# Patient Record
Sex: FEMALE | Race: WHITE | NOT HISPANIC OR LATINO | URBAN - NONMETROPOLITAN AREA
[De-identification: names, ages, dates, MRNs, and addresses within clinical notes are randomized per-mention and may not be internally consistent; named-entity substitution may affect disease eponyms.]

---

## 2020-11-22 ENCOUNTER — HOSPITAL ENCOUNTER (INPATIENT)
Facility: HOSPITAL | Age: 60
LOS: 2 days | Discharge: HOME HEALTH | DRG: 178 | End: 2020-11-24
Attending: STUDENT IN AN ORGANIZED HEALTH CARE EDUCATION/TRAINING PROGRAM | Admitting: FAMILY MEDICINE
Payer: MEDICARE

## 2020-11-22 ENCOUNTER — APPOINTMENT (EMERGENCY)
Dept: CT IMAGING | Facility: HOSPITAL | Age: 60
DRG: 178 | End: 2020-11-22
Payer: MEDICARE

## 2020-11-22 DIAGNOSIS — E83.39 HYPOPHOSPHATEMIA: ICD-10-CM

## 2020-11-22 DIAGNOSIS — E66.01 CLASS 3 SEVERE OBESITY DUE TO EXCESS CALORIES WITH SERIOUS COMORBIDITY AND BODY MASS INDEX (BMI) OF 40.0 TO 44.9 IN ADULT (HCC): ICD-10-CM

## 2020-11-22 DIAGNOSIS — Z87.09 HISTORY OF COPD: ICD-10-CM

## 2020-11-22 DIAGNOSIS — R09.02 HYPOXIA: ICD-10-CM

## 2020-11-22 DIAGNOSIS — U07.1 COVID-19 VIRUS INFECTION: ICD-10-CM

## 2020-11-22 DIAGNOSIS — E83.42 HYPOMAGNESEMIA: ICD-10-CM

## 2020-11-22 DIAGNOSIS — U07.1 COVID-19: ICD-10-CM

## 2020-11-22 DIAGNOSIS — R06.02 SHORTNESS OF BREATH: Primary | ICD-10-CM

## 2020-11-22 DIAGNOSIS — Z86.69 HISTORY OF CHIARI MALFORMATION: ICD-10-CM

## 2020-11-22 PROBLEM — I10 ESSENTIAL HYPERTENSION: Status: ACTIVE | Noted: 2020-11-22

## 2020-11-22 PROBLEM — E78.49 OTHER HYPERLIPIDEMIA: Status: ACTIVE | Noted: 2020-11-22

## 2020-11-22 PROBLEM — F41.9 ANXIETY: Status: ACTIVE | Noted: 2020-11-22

## 2020-11-22 PROBLEM — E66.813 CLASS 3 SEVERE OBESITY DUE TO EXCESS CALORIES IN ADULT (HCC): Status: ACTIVE | Noted: 2020-11-22

## 2020-11-22 PROBLEM — K21.9 GASTROESOPHAGEAL REFLUX DISEASE WITHOUT ESOPHAGITIS: Status: ACTIVE | Noted: 2020-11-22

## 2020-11-22 PROBLEM — F17.200 CURRENT SMOKER: Status: ACTIVE | Noted: 2020-11-22

## 2020-11-22 LAB
ABO GROUP BLD: NORMAL
ANION GAP SERPL CALCULATED.3IONS-SCNC: 8 MMOL/L (ref 4–13)
BACTERIA UR QL AUTO: ABNORMAL /HPF
BASE EX.OXY STD BLDV CALC-SCNC: 84.9 % (ref 60–80)
BASE EXCESS BLDV CALC-SCNC: 3 MMOL/L
BASOPHILS # BLD AUTO: 0.03 THOUSANDS/ΜL (ref 0–0.1)
BASOPHILS NFR BLD AUTO: 0 % (ref 0–1)
BILIRUB UR QL STRIP: NEGATIVE
BUN SERPL-MCNC: 11 MG/DL (ref 5–25)
CALCIUM SERPL-MCNC: 8.9 MG/DL (ref 8.3–10.1)
CHLORIDE SERPL-SCNC: 104 MMOL/L (ref 100–108)
CK SERPL-CCNC: 92 U/L (ref 26–192)
CLARITY UR: CLEAR
CO2 SERPL-SCNC: 28 MMOL/L (ref 21–32)
COLOR UR: YELLOW
CREAT SERPL-MCNC: 0.75 MG/DL (ref 0.6–1.3)
CRP SERPL QL: 7.8 MG/L
D DIMER PPP FEU-MCNC: 1.61 UG/ML FEU
EOSINOPHIL # BLD AUTO: 0.25 THOUSAND/ΜL (ref 0–0.61)
EOSINOPHIL NFR BLD AUTO: 3 % (ref 0–6)
ERYTHROCYTE [DISTWIDTH] IN BLOOD BY AUTOMATED COUNT: 13.5 % (ref 11.6–15.1)
FERRITIN SERPL-MCNC: 73 NG/ML (ref 8–388)
FLUAV RNA RESP QL NAA+PROBE: NEGATIVE
FLUBV RNA RESP QL NAA+PROBE: NEGATIVE
GFR SERPL CREATININE-BSD FRML MDRD: 86 ML/MIN/1.73SQ M
GLUCOSE SERPL-MCNC: 90 MG/DL (ref 65–140)
GLUCOSE UR STRIP-MCNC: NEGATIVE MG/DL
HCO3 BLDV-SCNC: 26.5 MMOL/L (ref 24–30)
HCT VFR BLD AUTO: 40.2 % (ref 34.8–46.1)
HGB BLD-MCNC: 13.4 G/DL (ref 11.5–15.4)
HGB UR QL STRIP.AUTO: NEGATIVE
IMM GRANULOCYTES # BLD AUTO: 0.03 THOUSAND/UL (ref 0–0.2)
IMM GRANULOCYTES NFR BLD AUTO: 0 % (ref 0–2)
INR PPP: 1.15 (ref 0.84–1.19)
KETONES UR STRIP-MCNC: NEGATIVE MG/DL
LACTATE SERPL-SCNC: 1.3 MMOL/L (ref 0.5–2)
LEUKOCYTE ESTERASE UR QL STRIP: NEGATIVE
LYMPHOCYTES # BLD AUTO: 2.74 THOUSANDS/ΜL (ref 0.6–4.47)
LYMPHOCYTES NFR BLD AUTO: 31 % (ref 14–44)
MAGNESIUM SERPL-MCNC: 1.4 MG/DL (ref 1.6–2.6)
MCH RBC QN AUTO: 29.6 PG (ref 26.8–34.3)
MCHC RBC AUTO-ENTMCNC: 33.3 G/DL (ref 31.4–37.4)
MCV RBC AUTO: 89 FL (ref 82–98)
MONOCYTES # BLD AUTO: 0.46 THOUSAND/ΜL (ref 0.17–1.22)
MONOCYTES NFR BLD AUTO: 5 % (ref 4–12)
NEUTROPHILS # BLD AUTO: 5.4 THOUSANDS/ΜL (ref 1.85–7.62)
NEUTS SEG NFR BLD AUTO: 61 % (ref 43–75)
NITRITE UR QL STRIP: NEGATIVE
NON-SQ EPI CELLS URNS QL MICRO: ABNORMAL /HPF
NRBC BLD AUTO-RTO: 0 /100 WBCS
NT-PROBNP SERPL-MCNC: 502 PG/ML
O2 CT BLDV-SCNC: 17.1 ML/DL
PCO2 BLDV: 37.3 MM HG (ref 42–50)
PH BLDV: 7.47 [PH] (ref 7.3–7.4)
PH UR STRIP.AUTO: 7 [PH]
PHOSPHATE SERPL-MCNC: 2.2 MG/DL (ref 2.3–4.1)
PLATELET # BLD AUTO: 201 THOUSANDS/UL (ref 149–390)
PMV BLD AUTO: 9.8 FL (ref 8.9–12.7)
PO2 BLDV: 49.6 MM HG (ref 35–45)
POTASSIUM SERPL-SCNC: 3.5 MMOL/L (ref 3.5–5.3)
PROT UR STRIP-MCNC: NEGATIVE MG/DL
PROTHROMBIN TIME: 14.5 SECONDS (ref 11.6–14.5)
RBC # BLD AUTO: 4.53 MILLION/UL (ref 3.81–5.12)
RBC #/AREA URNS AUTO: ABNORMAL /HPF
RH BLD: POSITIVE
RSV RNA RESP QL NAA+PROBE: NEGATIVE
SARS-COV-2 RNA RESP QL NAA+PROBE: POSITIVE
SODIUM SERPL-SCNC: 140 MMOL/L (ref 136–145)
SP GR UR STRIP.AUTO: 1.01 (ref 1–1.03)
TROPONIN I SERPL-MCNC: <0.02 NG/ML
TROPONIN I SERPL-MCNC: <0.02 NG/ML
UROBILINOGEN UR QL STRIP.AUTO: 0.2 E.U./DL
WBC # BLD AUTO: 8.91 THOUSAND/UL (ref 4.31–10.16)
WBC #/AREA URNS AUTO: ABNORMAL /HPF

## 2020-11-22 PROCEDURE — 82550 ASSAY OF CK (CPK): CPT | Performed by: NURSE PRACTITIONER

## 2020-11-22 PROCEDURE — G1004 CDSM NDSC: HCPCS

## 2020-11-22 PROCEDURE — 83520 IMMUNOASSAY QUANT NOS NONAB: CPT | Performed by: STUDENT IN AN ORGANIZED HEALTH CARE EDUCATION/TRAINING PROGRAM

## 2020-11-22 PROCEDURE — 99223 1ST HOSP IP/OBS HIGH 75: CPT | Performed by: NURSE PRACTITIONER

## 2020-11-22 PROCEDURE — 36415 COLL VENOUS BLD VENIPUNCTURE: CPT | Performed by: STUDENT IN AN ORGANIZED HEALTH CARE EDUCATION/TRAINING PROGRAM

## 2020-11-22 PROCEDURE — 86140 C-REACTIVE PROTEIN: CPT | Performed by: STUDENT IN AN ORGANIZED HEALTH CARE EDUCATION/TRAINING PROGRAM

## 2020-11-22 PROCEDURE — 80048 BASIC METABOLIC PNL TOTAL CA: CPT | Performed by: STUDENT IN AN ORGANIZED HEALTH CARE EDUCATION/TRAINING PROGRAM

## 2020-11-22 PROCEDURE — 86901 BLOOD TYPING SEROLOGIC RH(D): CPT | Performed by: NURSE PRACTITIONER

## 2020-11-22 PROCEDURE — 81001 URINALYSIS AUTO W/SCOPE: CPT | Performed by: STUDENT IN AN ORGANIZED HEALTH CARE EDUCATION/TRAINING PROGRAM

## 2020-11-22 PROCEDURE — 85025 COMPLETE CBC W/AUTO DIFF WBC: CPT | Performed by: STUDENT IN AN ORGANIZED HEALTH CARE EDUCATION/TRAINING PROGRAM

## 2020-11-22 PROCEDURE — 83735 ASSAY OF MAGNESIUM: CPT | Performed by: STUDENT IN AN ORGANIZED HEALTH CARE EDUCATION/TRAINING PROGRAM

## 2020-11-22 PROCEDURE — 71275 CT ANGIOGRAPHY CHEST: CPT

## 2020-11-22 PROCEDURE — 70450 CT HEAD/BRAIN W/O DYE: CPT

## 2020-11-22 PROCEDURE — 99285 EMERGENCY DEPT VISIT HI MDM: CPT

## 2020-11-22 PROCEDURE — 0241U HB NFCT DS VIR RESP RNA 4 TRGT: CPT | Performed by: STUDENT IN AN ORGANIZED HEALTH CARE EDUCATION/TRAINING PROGRAM

## 2020-11-22 PROCEDURE — 84484 ASSAY OF TROPONIN QUANT: CPT | Performed by: NURSE PRACTITIONER

## 2020-11-22 PROCEDURE — 86900 BLOOD TYPING SEROLOGIC ABO: CPT | Performed by: NURSE PRACTITIONER

## 2020-11-22 PROCEDURE — 96365 THER/PROPH/DIAG IV INF INIT: CPT

## 2020-11-22 PROCEDURE — 83880 ASSAY OF NATRIURETIC PEPTIDE: CPT | Performed by: STUDENT IN AN ORGANIZED HEALTH CARE EDUCATION/TRAINING PROGRAM

## 2020-11-22 PROCEDURE — 84145 PROCALCITONIN (PCT): CPT | Performed by: NURSE PRACTITIONER

## 2020-11-22 PROCEDURE — 83605 ASSAY OF LACTIC ACID: CPT | Performed by: STUDENT IN AN ORGANIZED HEALTH CARE EDUCATION/TRAINING PROGRAM

## 2020-11-22 PROCEDURE — 84100 ASSAY OF PHOSPHORUS: CPT | Performed by: STUDENT IN AN ORGANIZED HEALTH CARE EDUCATION/TRAINING PROGRAM

## 2020-11-22 PROCEDURE — 83520 IMMUNOASSAY QUANT NOS NONAB: CPT | Performed by: NURSE PRACTITIONER

## 2020-11-22 PROCEDURE — 82805 BLOOD GASES W/O2 SATURATION: CPT | Performed by: STUDENT IN AN ORGANIZED HEALTH CARE EDUCATION/TRAINING PROGRAM

## 2020-11-22 PROCEDURE — 85610 PROTHROMBIN TIME: CPT | Performed by: STUDENT IN AN ORGANIZED HEALTH CARE EDUCATION/TRAINING PROGRAM

## 2020-11-22 PROCEDURE — 85379 FIBRIN DEGRADATION QUANT: CPT | Performed by: STUDENT IN AN ORGANIZED HEALTH CARE EDUCATION/TRAINING PROGRAM

## 2020-11-22 PROCEDURE — 84484 ASSAY OF TROPONIN QUANT: CPT | Performed by: STUDENT IN AN ORGANIZED HEALTH CARE EDUCATION/TRAINING PROGRAM

## 2020-11-22 PROCEDURE — 99285 EMERGENCY DEPT VISIT HI MDM: CPT | Performed by: STUDENT IN AN ORGANIZED HEALTH CARE EDUCATION/TRAINING PROGRAM

## 2020-11-22 PROCEDURE — 82728 ASSAY OF FERRITIN: CPT | Performed by: STUDENT IN AN ORGANIZED HEALTH CARE EDUCATION/TRAINING PROGRAM

## 2020-11-22 PROCEDURE — 93005 ELECTROCARDIOGRAM TRACING: CPT

## 2020-11-22 RX ORDER — SODIUM CHLORIDE 9 MG/ML
75 INJECTION, SOLUTION INTRAVENOUS CONTINUOUS
Status: DISCONTINUED | OUTPATIENT
Start: 2020-11-22 | End: 2020-11-24 | Stop reason: HOSPADM

## 2020-11-22 RX ORDER — NAPROXEN 250 MG/1
250 TABLET ORAL 2 TIMES DAILY WITH MEALS
COMMUNITY
End: 2020-11-24 | Stop reason: HOSPADM

## 2020-11-22 RX ORDER — LISINOPRIL 20 MG/1
20 TABLET ORAL DAILY
COMMUNITY

## 2020-11-22 RX ORDER — PRAVASTATIN SODIUM 40 MG
40 TABLET ORAL
Status: DISCONTINUED | OUTPATIENT
Start: 2020-11-23 | End: 2020-11-24 | Stop reason: HOSPADM

## 2020-11-22 RX ORDER — ASPIRIN 81 MG/1
81 TABLET, CHEWABLE ORAL
COMMUNITY

## 2020-11-22 RX ORDER — VENLAFAXINE 75 MG/1
75 TABLET ORAL
COMMUNITY

## 2020-11-22 RX ORDER — VENLAFAXINE 37.5 MG/1
75 TABLET ORAL
Status: DISCONTINUED | OUTPATIENT
Start: 2020-11-22 | End: 2020-11-24 | Stop reason: HOSPADM

## 2020-11-22 RX ORDER — SIMVASTATIN 20 MG
20 TABLET ORAL
COMMUNITY

## 2020-11-22 RX ORDER — ALPRAZOLAM 0.5 MG/1
0.5 TABLET ORAL
Status: DISCONTINUED | OUTPATIENT
Start: 2020-11-22 | End: 2020-11-24 | Stop reason: HOSPADM

## 2020-11-22 RX ORDER — ATENOLOL 50 MG/1
50 TABLET ORAL
COMMUNITY

## 2020-11-22 RX ORDER — ACETAMINOPHEN 325 MG/1
975 TABLET ORAL ONCE
Status: COMPLETED | OUTPATIENT
Start: 2020-11-22 | End: 2020-11-22

## 2020-11-22 RX ORDER — MELATONIN
2000 DAILY
Status: DISCONTINUED | OUTPATIENT
Start: 2020-11-23 | End: 2020-11-24 | Stop reason: HOSPADM

## 2020-11-22 RX ORDER — VENLAFAXINE HYDROCHLORIDE 37.5 MG/1
150 CAPSULE, EXTENDED RELEASE ORAL DAILY
Status: DISCONTINUED | OUTPATIENT
Start: 2020-11-23 | End: 2020-11-24 | Stop reason: HOSPADM

## 2020-11-22 RX ORDER — ALPRAZOLAM 0.5 MG/1
TABLET ORAL
COMMUNITY

## 2020-11-22 RX ORDER — VENLAFAXINE HYDROCHLORIDE 150 MG/1
150 CAPSULE, EXTENDED RELEASE ORAL DAILY
COMMUNITY

## 2020-11-22 RX ORDER — LANOLIN ALCOHOL/MO/W.PET/CERES
CREAM (GRAM) TOPICAL DAILY
COMMUNITY

## 2020-11-22 RX ORDER — LISINOPRIL 20 MG/1
20 TABLET ORAL DAILY
Status: DISCONTINUED | OUTPATIENT
Start: 2020-11-23 | End: 2020-11-24 | Stop reason: HOSPADM

## 2020-11-22 RX ORDER — OMEPRAZOLE 20 MG/1
20 CAPSULE, DELAYED RELEASE ORAL DAILY
COMMUNITY

## 2020-11-22 RX ORDER — HEPARIN SODIUM 5000 [USP'U]/ML
7500 INJECTION, SOLUTION INTRAVENOUS; SUBCUTANEOUS EVERY 8 HOURS SCHEDULED
Status: DISCONTINUED | OUTPATIENT
Start: 2020-11-22 | End: 2020-11-24 | Stop reason: HOSPADM

## 2020-11-22 RX ORDER — ACETAMINOPHEN 325 MG/1
650 TABLET ORAL EVERY 6 HOURS PRN
Status: DISCONTINUED | OUTPATIENT
Start: 2020-11-22 | End: 2020-11-24 | Stop reason: HOSPADM

## 2020-11-22 RX ORDER — PANTOPRAZOLE SODIUM 40 MG/1
40 TABLET, DELAYED RELEASE ORAL
Status: DISCONTINUED | OUTPATIENT
Start: 2020-11-23 | End: 2020-11-24 | Stop reason: HOSPADM

## 2020-11-22 RX ORDER — ATENOLOL 50 MG/1
50 TABLET ORAL DAILY
Status: DISCONTINUED | OUTPATIENT
Start: 2020-11-23 | End: 2020-11-24 | Stop reason: HOSPADM

## 2020-11-22 RX ORDER — MAGNESIUM SULFATE 1 G/100ML
1 INJECTION INTRAVENOUS ONCE
Status: COMPLETED | OUTPATIENT
Start: 2020-11-22 | End: 2020-11-22

## 2020-11-22 RX ORDER — ASPIRIN 81 MG/1
81 TABLET, CHEWABLE ORAL DAILY
Status: DISCONTINUED | OUTPATIENT
Start: 2020-11-23 | End: 2020-11-24 | Stop reason: HOSPADM

## 2020-11-22 RX ADMIN — HEPARIN SODIUM 7500 UNITS: 5000 INJECTION INTRAVENOUS; SUBCUTANEOUS at 23:13

## 2020-11-22 RX ADMIN — SODIUM CHLORIDE 75 ML/HR: 0.9 INJECTION, SOLUTION INTRAVENOUS at 23:13

## 2020-11-22 RX ADMIN — ACETAMINOPHEN 975 MG: 325 TABLET ORAL at 16:34

## 2020-11-22 RX ADMIN — IOHEXOL 85 ML: 350 INJECTION, SOLUTION INTRAVENOUS at 18:18

## 2020-11-22 RX ADMIN — POTASSIUM & SODIUM PHOSPHATES POWDER PACK 280-160-250 MG 2 PACKET: 280-160-250 PACK at 18:28

## 2020-11-22 RX ADMIN — MAGNESIUM SULFATE HEPTAHYDRATE 1 G: 1 INJECTION, SOLUTION INTRAVENOUS at 18:20

## 2020-11-23 LAB
ALBUMIN SERPL BCP-MCNC: 3.1 G/DL (ref 3.5–5)
ALP SERPL-CCNC: 96 U/L (ref 46–116)
ALT SERPL W P-5'-P-CCNC: 57 U/L (ref 12–78)
ANION GAP SERPL CALCULATED.3IONS-SCNC: 9 MMOL/L (ref 4–13)
AST SERPL W P-5'-P-CCNC: 43 U/L (ref 5–45)
BASOPHILS # BLD AUTO: 0.03 THOUSANDS/ΜL (ref 0–0.1)
BASOPHILS NFR BLD AUTO: 0 % (ref 0–1)
BILIRUB SERPL-MCNC: 1.38 MG/DL (ref 0.2–1)
BUN SERPL-MCNC: 10 MG/DL (ref 5–25)
CALCIUM ALBUM COR SERPL-MCNC: 9.3 MG/DL (ref 8.3–10.1)
CALCIUM SERPL-MCNC: 8.6 MG/DL (ref 8.3–10.1)
CHLORIDE SERPL-SCNC: 106 MMOL/L (ref 100–108)
CO2 SERPL-SCNC: 27 MMOL/L (ref 21–32)
CREAT SERPL-MCNC: 0.8 MG/DL (ref 0.6–1.3)
EOSINOPHIL # BLD AUTO: 0.23 THOUSAND/ΜL (ref 0–0.61)
EOSINOPHIL NFR BLD AUTO: 3 % (ref 0–6)
ERYTHROCYTE [DISTWIDTH] IN BLOOD BY AUTOMATED COUNT: 13.5 % (ref 11.6–15.1)
GFR SERPL CREATININE-BSD FRML MDRD: 79 ML/MIN/1.73SQ M
GLUCOSE SERPL-MCNC: 97 MG/DL (ref 65–140)
HCT VFR BLD AUTO: 36.3 % (ref 34.8–46.1)
HGB BLD-MCNC: 12.3 G/DL (ref 11.5–15.4)
IMM GRANULOCYTES # BLD AUTO: 0.02 THOUSAND/UL (ref 0–0.2)
IMM GRANULOCYTES NFR BLD AUTO: 0 % (ref 0–2)
LYMPHOCYTES # BLD AUTO: 2.66 THOUSANDS/ΜL (ref 0.6–4.47)
LYMPHOCYTES NFR BLD AUTO: 39 % (ref 14–44)
MAGNESIUM SERPL-MCNC: 1.7 MG/DL (ref 1.6–2.6)
MCH RBC QN AUTO: 30.4 PG (ref 26.8–34.3)
MCHC RBC AUTO-ENTMCNC: 33.9 G/DL (ref 31.4–37.4)
MCV RBC AUTO: 90 FL (ref 82–98)
MONOCYTES # BLD AUTO: 0.36 THOUSAND/ΜL (ref 0.17–1.22)
MONOCYTES NFR BLD AUTO: 5 % (ref 4–12)
NEUTROPHILS # BLD AUTO: 3.51 THOUSANDS/ΜL (ref 1.85–7.62)
NEUTS SEG NFR BLD AUTO: 53 % (ref 43–75)
NRBC BLD AUTO-RTO: 0 /100 WBCS
PHOSPHATE SERPL-MCNC: 3.8 MG/DL (ref 2.3–4.1)
PLATELET # BLD AUTO: 186 THOUSANDS/UL (ref 149–390)
PMV BLD AUTO: 10.2 FL (ref 8.9–12.7)
POTASSIUM SERPL-SCNC: 3.5 MMOL/L (ref 3.5–5.3)
PROCALCITONIN SERPL-MCNC: <0.05 NG/ML
PROT SERPL-MCNC: 6.3 G/DL (ref 6.4–8.2)
RBC # BLD AUTO: 4.04 MILLION/UL (ref 3.81–5.12)
SODIUM SERPL-SCNC: 142 MMOL/L (ref 136–145)
TROPONIN I SERPL-MCNC: <0.02 NG/ML
WBC # BLD AUTO: 6.81 THOUSAND/UL (ref 4.31–10.16)

## 2020-11-23 PROCEDURE — 84484 ASSAY OF TROPONIN QUANT: CPT | Performed by: NURSE PRACTITIONER

## 2020-11-23 PROCEDURE — 85025 COMPLETE CBC W/AUTO DIFF WBC: CPT | Performed by: NURSE PRACTITIONER

## 2020-11-23 PROCEDURE — 80053 COMPREHEN METABOLIC PANEL: CPT | Performed by: NURSE PRACTITIONER

## 2020-11-23 PROCEDURE — 84100 ASSAY OF PHOSPHORUS: CPT | Performed by: NURSE PRACTITIONER

## 2020-11-23 PROCEDURE — 83735 ASSAY OF MAGNESIUM: CPT | Performed by: NURSE PRACTITIONER

## 2020-11-23 PROCEDURE — 99232 SBSQ HOSP IP/OBS MODERATE 35: CPT | Performed by: INTERNAL MEDICINE

## 2020-11-23 RX ORDER — NICOTINE 21 MG/24HR
21 PATCH, TRANSDERMAL 24 HOURS TRANSDERMAL DAILY
Status: DISCONTINUED | OUTPATIENT
Start: 2020-11-23 | End: 2020-11-24 | Stop reason: HOSPADM

## 2020-11-23 RX ORDER — COVID-19 ANTIGEN TEST
2 KIT MISCELLANEOUS 2 TIMES DAILY
COMMUNITY
End: 2020-11-24 | Stop reason: HOSPADM

## 2020-11-23 RX ORDER — DIPHENHYDRAMINE HCL 25 MG
25 TABLET ORAL DAILY
COMMUNITY

## 2020-11-23 RX ADMIN — HEPARIN SODIUM 7500 UNITS: 5000 INJECTION INTRAVENOUS; SUBCUTANEOUS at 05:26

## 2020-11-23 RX ADMIN — CYANOCOBALAMIN TAB 500 MCG 1000 MCG: 500 TAB at 08:27

## 2020-11-23 RX ADMIN — ATENOLOL 50 MG: 50 TABLET ORAL at 08:26

## 2020-11-23 RX ADMIN — VENLAFAXINE HYDROCHLORIDE 150 MG: 37.5 CAPSULE, EXTENDED RELEASE ORAL at 08:26

## 2020-11-23 RX ADMIN — HEPARIN SODIUM 7500 UNITS: 5000 INJECTION INTRAVENOUS; SUBCUTANEOUS at 15:19

## 2020-11-23 RX ADMIN — Medication 2000 UNITS: at 08:26

## 2020-11-23 RX ADMIN — ACETAMINOPHEN 650 MG: 325 TABLET ORAL at 02:45

## 2020-11-23 RX ADMIN — PANTOPRAZOLE SODIUM 40 MG: 40 TABLET, DELAYED RELEASE ORAL at 05:25

## 2020-11-23 RX ADMIN — HEPARIN SODIUM 7500 UNITS: 5000 INJECTION INTRAVENOUS; SUBCUTANEOUS at 21:39

## 2020-11-23 RX ADMIN — ACETAMINOPHEN 650 MG: 325 TABLET ORAL at 10:07

## 2020-11-23 RX ADMIN — VENLAFAXINE 75 MG: 37.5 TABLET ORAL at 21:39

## 2020-11-23 RX ADMIN — Medication 1 TABLET: at 08:26

## 2020-11-23 RX ADMIN — PRAVASTATIN SODIUM 40 MG: 40 TABLET ORAL at 16:09

## 2020-11-23 RX ADMIN — SODIUM CHLORIDE 75 ML/HR: 0.9 INJECTION, SOLUTION INTRAVENOUS at 23:11

## 2020-11-23 RX ADMIN — ASPIRIN 81 MG CHEWABLE TABLET 81 MG: 81 TABLET CHEWABLE at 08:27

## 2020-11-23 RX ADMIN — LISINOPRIL 20 MG: 20 TABLET ORAL at 08:26

## 2020-11-23 RX ADMIN — ACETAMINOPHEN 650 MG: 325 TABLET ORAL at 17:31

## 2020-11-23 RX ADMIN — SODIUM CHLORIDE 75 ML/HR: 0.9 INJECTION, SOLUTION INTRAVENOUS at 09:41

## 2020-11-23 RX ADMIN — VENLAFAXINE 75 MG: 37.5 TABLET ORAL at 00:00

## 2020-11-23 RX ADMIN — NICOTINE 21 MG: 21 PATCH, EXTENDED RELEASE TRANSDERMAL at 02:45

## 2020-11-24 VITALS
DIASTOLIC BLOOD PRESSURE: 81 MMHG | TEMPERATURE: 98.2 F | HEART RATE: 54 BPM | OXYGEN SATURATION: 91 % | HEIGHT: 65 IN | RESPIRATION RATE: 16 BRPM | WEIGHT: 251.54 LBS | SYSTOLIC BLOOD PRESSURE: 150 MMHG | BODY MASS INDEX: 41.91 KG/M2

## 2020-11-24 LAB
ALBUMIN SERPL BCP-MCNC: 3.3 G/DL (ref 3.5–5)
ALP SERPL-CCNC: 104 U/L (ref 46–116)
ALT SERPL W P-5'-P-CCNC: 62 U/L (ref 12–78)
ANION GAP SERPL CALCULATED.3IONS-SCNC: 8 MMOL/L (ref 4–13)
AST SERPL W P-5'-P-CCNC: 51 U/L (ref 5–45)
BILIRUB SERPL-MCNC: 0.92 MG/DL (ref 0.2–1)
BUN SERPL-MCNC: 13 MG/DL (ref 5–25)
CALCIUM ALBUM COR SERPL-MCNC: 9.4 MG/DL (ref 8.3–10.1)
CALCIUM SERPL-MCNC: 8.8 MG/DL (ref 8.3–10.1)
CHLORIDE SERPL-SCNC: 106 MMOL/L (ref 100–108)
CO2 SERPL-SCNC: 28 MMOL/L (ref 21–32)
CREAT SERPL-MCNC: 0.82 MG/DL (ref 0.6–1.3)
CRP SERPL QL: 13 MG/L
D DIMER PPP FEU-MCNC: 0.72 UG/ML FEU
ERYTHROCYTE [DISTWIDTH] IN BLOOD BY AUTOMATED COUNT: 14.2 % (ref 11.6–15.1)
GFR SERPL CREATININE-BSD FRML MDRD: 77 ML/MIN/1.73SQ M
GLUCOSE SERPL-MCNC: 117 MG/DL (ref 65–140)
HCT VFR BLD AUTO: 38.2 % (ref 34.8–46.1)
HGB BLD-MCNC: 12.7 G/DL (ref 11.5–15.4)
MCH RBC QN AUTO: 30.5 PG (ref 26.8–34.3)
MCHC RBC AUTO-ENTMCNC: 33.2 G/DL (ref 31.4–37.4)
MCV RBC AUTO: 92 FL (ref 82–98)
PLATELET # BLD AUTO: 173 THOUSANDS/UL (ref 149–390)
PMV BLD AUTO: 9.8 FL (ref 8.9–12.7)
POTASSIUM SERPL-SCNC: 3.9 MMOL/L (ref 3.5–5.3)
PROCALCITONIN SERPL-MCNC: <0.05 NG/ML
PROT SERPL-MCNC: 6.6 G/DL (ref 6.4–8.2)
RBC # BLD AUTO: 4.16 MILLION/UL (ref 3.81–5.12)
SODIUM SERPL-SCNC: 142 MMOL/L (ref 136–145)
WBC # BLD AUTO: 6.2 THOUSAND/UL (ref 4.31–10.16)

## 2020-11-24 PROCEDURE — 84145 PROCALCITONIN (PCT): CPT | Performed by: NURSE PRACTITIONER

## 2020-11-24 PROCEDURE — 85027 COMPLETE CBC AUTOMATED: CPT | Performed by: INTERNAL MEDICINE

## 2020-11-24 PROCEDURE — 85379 FIBRIN DEGRADATION QUANT: CPT | Performed by: INTERNAL MEDICINE

## 2020-11-24 PROCEDURE — 86140 C-REACTIVE PROTEIN: CPT | Performed by: INTERNAL MEDICINE

## 2020-11-24 PROCEDURE — 99239 HOSP IP/OBS DSCHRG MGMT >30: CPT | Performed by: FAMILY MEDICINE

## 2020-11-24 PROCEDURE — 80053 COMPREHEN METABOLIC PANEL: CPT | Performed by: INTERNAL MEDICINE

## 2020-11-24 RX ADMIN — HEPARIN SODIUM 7500 UNITS: 5000 INJECTION INTRAVENOUS; SUBCUTANEOUS at 06:03

## 2020-11-24 RX ADMIN — Medication 1 TABLET: at 09:13

## 2020-11-24 RX ADMIN — ATENOLOL 50 MG: 50 TABLET ORAL at 09:13

## 2020-11-24 RX ADMIN — LISINOPRIL 20 MG: 20 TABLET ORAL at 09:13

## 2020-11-24 RX ADMIN — Medication 2000 UNITS: at 09:13

## 2020-11-24 RX ADMIN — ACETAMINOPHEN 650 MG: 325 TABLET ORAL at 12:39

## 2020-11-24 RX ADMIN — PANTOPRAZOLE SODIUM 40 MG: 40 TABLET, DELAYED RELEASE ORAL at 06:03

## 2020-11-24 RX ADMIN — CYANOCOBALAMIN TAB 500 MCG 1000 MCG: 500 TAB at 09:13

## 2020-11-24 RX ADMIN — VENLAFAXINE HYDROCHLORIDE 150 MG: 37.5 CAPSULE, EXTENDED RELEASE ORAL at 09:13

## 2020-11-24 RX ADMIN — ASPIRIN 81 MG CHEWABLE TABLET 81 MG: 81 TABLET CHEWABLE at 09:13

## 2020-11-24 RX ADMIN — NICOTINE 21 MG: 21 PATCH, EXTENDED RELEASE TRANSDERMAL at 09:13

## 2020-11-24 RX ADMIN — ACETAMINOPHEN 650 MG: 325 TABLET ORAL at 06:03

## 2020-11-26 LAB
ATRIAL RATE: 62 BPM
P AXIS: 73 DEGREES
PR INTERVAL: 148 MS
QRS AXIS: 75 DEGREES
QRSD INTERVAL: 82 MS
QT INTERVAL: 442 MS
QTC INTERVAL: 448 MS
T WAVE AXIS: 61 DEGREES
VENTRICULAR RATE: 62 BPM

## 2020-11-26 PROCEDURE — 93010 ELECTROCARDIOGRAM REPORT: CPT | Performed by: INTERNAL MEDICINE

## 2020-11-28 LAB
IL6 SERPL-MCNC: 13.7 PG/ML (ref 0–13)
IL6 SERPL-MCNC: 6.1 PG/ML (ref 0–13)

## 2021-04-13 DIAGNOSIS — Z23 ENCOUNTER FOR IMMUNIZATION: ICD-10-CM

## 2023-08-10 ENCOUNTER — APPOINTMENT (OUTPATIENT)
Dept: LAB | Facility: HOSPITAL | Age: 63
End: 2023-08-10
Payer: MEDICARE

## 2023-08-10 DIAGNOSIS — R35.0 URINARY FREQUENCY: ICD-10-CM

## 2023-08-10 DIAGNOSIS — L02.212 ABSCESS OF BACK: ICD-10-CM

## 2023-08-10 DIAGNOSIS — R39.15 URGENCY OF URINATION: ICD-10-CM

## 2023-08-10 LAB
ALBUMIN SERPL BCP-MCNC: 4 G/DL (ref 3.5–5)
ALP SERPL-CCNC: 73 U/L (ref 34–104)
ALT SERPL W P-5'-P-CCNC: 23 U/L (ref 7–52)
ANION GAP SERPL CALCULATED.3IONS-SCNC: 5 MMOL/L
AST SERPL W P-5'-P-CCNC: 29 U/L (ref 13–39)
BASOPHILS # BLD AUTO: 0.06 THOUSANDS/ÂΜL (ref 0–0.1)
BASOPHILS NFR BLD AUTO: 1 % (ref 0–1)
BILIRUB SERPL-MCNC: 0.54 MG/DL (ref 0.2–1)
BUN SERPL-MCNC: 13 MG/DL (ref 5–25)
CALCIUM SERPL-MCNC: 9.3 MG/DL (ref 8.4–10.2)
CHLORIDE SERPL-SCNC: 105 MMOL/L (ref 96–108)
CO2 SERPL-SCNC: 29 MMOL/L (ref 21–32)
CREAT SERPL-MCNC: 0.71 MG/DL (ref 0.6–1.3)
EOSINOPHIL # BLD AUTO: 0.44 THOUSAND/ÂΜL (ref 0–0.61)
EOSINOPHIL NFR BLD AUTO: 6 % (ref 0–6)
ERYTHROCYTE [DISTWIDTH] IN BLOOD BY AUTOMATED COUNT: 14 % (ref 11.6–15.1)
GFR SERPL CREATININE-BSD FRML MDRD: 89 ML/MIN/1.73SQ M
GLUCOSE P FAST SERPL-MCNC: 102 MG/DL (ref 65–99)
HCT VFR BLD AUTO: 46.4 % (ref 34.8–46.1)
HGB BLD-MCNC: 14.9 G/DL (ref 11.5–15.4)
IMM GRANULOCYTES # BLD AUTO: 0.02 THOUSAND/UL (ref 0–0.2)
IMM GRANULOCYTES NFR BLD AUTO: 0 % (ref 0–2)
LYMPHOCYTES # BLD AUTO: 2.79 THOUSANDS/ÂΜL (ref 0.6–4.47)
LYMPHOCYTES NFR BLD AUTO: 38 % (ref 14–44)
MCH RBC QN AUTO: 29.6 PG (ref 26.8–34.3)
MCHC RBC AUTO-ENTMCNC: 32.1 G/DL (ref 31.4–37.4)
MCV RBC AUTO: 92 FL (ref 82–98)
MONOCYTES # BLD AUTO: 0.47 THOUSAND/ÂΜL (ref 0.17–1.22)
MONOCYTES NFR BLD AUTO: 7 % (ref 4–12)
NEUTROPHILS # BLD AUTO: 3.49 THOUSANDS/ÂΜL (ref 1.85–7.62)
NEUTS SEG NFR BLD AUTO: 48 % (ref 43–75)
NRBC BLD AUTO-RTO: 0 /100 WBCS
PLATELET # BLD AUTO: 253 THOUSANDS/UL (ref 149–390)
PMV BLD AUTO: 10 FL (ref 8.9–12.7)
POTASSIUM SERPL-SCNC: 4.1 MMOL/L (ref 3.5–5.3)
PROT SERPL-MCNC: 7.3 G/DL (ref 6.4–8.4)
RBC # BLD AUTO: 5.04 MILLION/UL (ref 3.81–5.12)
SODIUM SERPL-SCNC: 139 MMOL/L (ref 135–147)
WBC # BLD AUTO: 7.27 THOUSAND/UL (ref 4.31–10.16)

## 2023-08-10 PROCEDURE — 36415 COLL VENOUS BLD VENIPUNCTURE: CPT

## 2023-08-10 PROCEDURE — 80053 COMPREHEN METABOLIC PANEL: CPT

## 2023-08-10 PROCEDURE — 85025 COMPLETE CBC W/AUTO DIFF WBC: CPT

## 2023-08-10 PROCEDURE — 87086 URINE CULTURE/COLONY COUNT: CPT

## 2023-08-12 LAB
BACTERIA UR CULT: ABNORMAL
BACTERIA UR CULT: ABNORMAL

## 2024-02-24 ENCOUNTER — APPOINTMENT (EMERGENCY)
Dept: RADIOLOGY | Facility: HOSPITAL | Age: 64
DRG: 557 | End: 2024-02-24
Payer: MEDICARE

## 2024-02-24 ENCOUNTER — APPOINTMENT (EMERGENCY)
Dept: CT IMAGING | Facility: HOSPITAL | Age: 64
DRG: 557 | End: 2024-02-24
Payer: MEDICARE

## 2024-02-24 ENCOUNTER — HOSPITAL ENCOUNTER (INPATIENT)
Facility: HOSPITAL | Age: 64
LOS: 1 days | Discharge: HOME | DRG: 557 | End: 2024-02-26
Attending: EMERGENCY MEDICINE | Admitting: FAMILY MEDICINE
Payer: MEDICARE

## 2024-02-24 DIAGNOSIS — E83.42 HYPOMAGNESEMIA: ICD-10-CM

## 2024-02-24 DIAGNOSIS — M62.82 NON-TRAUMATIC RHABDOMYOLYSIS: ICD-10-CM

## 2024-02-24 DIAGNOSIS — T78.40XA ALLERGIES: ICD-10-CM

## 2024-02-24 DIAGNOSIS — R09.02 HYPOXEMIA: ICD-10-CM

## 2024-02-24 DIAGNOSIS — I10 ESSENTIAL HYPERTENSION: ICD-10-CM

## 2024-02-24 DIAGNOSIS — J44.9 COPD (CHRONIC OBSTRUCTIVE PULMONARY DISEASE) (HCC): ICD-10-CM

## 2024-02-24 DIAGNOSIS — M79.10 MYALGIA: Primary | ICD-10-CM

## 2024-02-24 DIAGNOSIS — G47.00 INSOMNIA: ICD-10-CM

## 2024-02-24 PROBLEM — F43.9 STRESS AT HOME: Status: ACTIVE | Noted: 2024-02-24

## 2024-02-24 PROBLEM — G93.40 ENCEPHALOPATHY ACUTE: Status: ACTIVE | Noted: 2024-02-24

## 2024-02-24 PROBLEM — E87.1 HYPONATREMIA: Status: ACTIVE | Noted: 2024-02-24

## 2024-02-24 PROBLEM — E87.6 HYPOKALEMIA: Status: ACTIVE | Noted: 2024-02-24

## 2024-02-24 PROBLEM — R44.3 HALLUCINATIONS: Status: ACTIVE | Noted: 2024-02-24

## 2024-02-24 LAB
2HR DELTA HS TROPONIN: 1 NG/L
ALBUMIN SERPL BCP-MCNC: 4.5 G/DL (ref 3.5–5)
ALP SERPL-CCNC: 84 U/L (ref 34–104)
ALT SERPL W P-5'-P-CCNC: 27 U/L (ref 7–52)
ANION GAP SERPL CALCULATED.3IONS-SCNC: 8 MMOL/L
APTT PPP: 50 SECONDS (ref 23–37)
AST SERPL W P-5'-P-CCNC: 44 U/L (ref 13–39)
BASOPHILS # BLD AUTO: 0.05 THOUSANDS/ÂΜL (ref 0–0.1)
BASOPHILS NFR BLD AUTO: 0 % (ref 0–1)
BILIRUB SERPL-MCNC: 2.19 MG/DL (ref 0.2–1)
BUN SERPL-MCNC: 9 MG/DL (ref 5–25)
CALCIUM SERPL-MCNC: 9.8 MG/DL (ref 8.4–10.2)
CARDIAC TROPONIN I PNL SERPL HS: 8 NG/L
CARDIAC TROPONIN I PNL SERPL HS: 9 NG/L
CHLORIDE SERPL-SCNC: 97 MMOL/L (ref 96–108)
CK SERPL-CCNC: 640 U/L (ref 26–192)
CO2 SERPL-SCNC: 27 MMOL/L (ref 21–32)
CREAT SERPL-MCNC: 0.68 MG/DL (ref 0.6–1.3)
CRP SERPL QL: 74.7 MG/L
EOSINOPHIL # BLD AUTO: 0.06 THOUSAND/ÂΜL (ref 0–0.61)
EOSINOPHIL NFR BLD AUTO: 1 % (ref 0–6)
ERYTHROCYTE [DISTWIDTH] IN BLOOD BY AUTOMATED COUNT: 14.9 % (ref 11.6–15.1)
FLUAV RNA RESP QL NAA+PROBE: NEGATIVE
FLUBV RNA RESP QL NAA+PROBE: NEGATIVE
GFR SERPL CREATININE-BSD FRML MDRD: 91 ML/MIN/1.73SQ M
GLUCOSE SERPL-MCNC: 117 MG/DL (ref 65–140)
HCT VFR BLD AUTO: 41.1 % (ref 34.8–46.1)
HGB BLD-MCNC: 13.7 G/DL (ref 11.5–15.4)
IMM GRANULOCYTES # BLD AUTO: 0.06 THOUSAND/UL (ref 0–0.2)
IMM GRANULOCYTES NFR BLD AUTO: 1 % (ref 0–2)
INR PPP: 1.1 (ref 0.84–1.19)
LACTATE SERPL-SCNC: 1 MMOL/L (ref 0.5–2)
LACTATE SERPL-SCNC: 2.2 MMOL/L (ref 0.5–2)
LYMPHOCYTES # BLD AUTO: 2.33 THOUSANDS/ÂΜL (ref 0.6–4.47)
LYMPHOCYTES NFR BLD AUTO: 20 % (ref 14–44)
MCH RBC QN AUTO: 29.5 PG (ref 26.8–34.3)
MCHC RBC AUTO-ENTMCNC: 33.3 G/DL (ref 31.4–37.4)
MCV RBC AUTO: 89 FL (ref 82–98)
MONOCYTES # BLD AUTO: 0.81 THOUSAND/ÂΜL (ref 0.17–1.22)
MONOCYTES NFR BLD AUTO: 7 % (ref 4–12)
NEUTROPHILS # BLD AUTO: 8.14 THOUSANDS/ÂΜL (ref 1.85–7.62)
NEUTS SEG NFR BLD AUTO: 71 % (ref 43–75)
NRBC BLD AUTO-RTO: 0 /100 WBCS
PLATELET # BLD AUTO: 203 THOUSANDS/UL (ref 149–390)
PMV BLD AUTO: 9.7 FL (ref 8.9–12.7)
POTASSIUM SERPL-SCNC: 3 MMOL/L (ref 3.5–5.3)
PROCALCITONIN SERPL-MCNC: 0.07 NG/ML
PROT SERPL-MCNC: 8.2 G/DL (ref 6.4–8.4)
PROTHROMBIN TIME: 14.5 SECONDS (ref 11.6–14.5)
RBC # BLD AUTO: 4.64 MILLION/UL (ref 3.81–5.12)
RSV RNA RESP QL NAA+PROBE: NEGATIVE
SARS-COV-2 RNA RESP QL NAA+PROBE: NEGATIVE
SODIUM SERPL-SCNC: 132 MMOL/L (ref 135–147)
WBC # BLD AUTO: 11.45 THOUSAND/UL (ref 4.31–10.16)

## 2024-02-24 PROCEDURE — 82550 ASSAY OF CK (CPK): CPT | Performed by: EMERGENCY MEDICINE

## 2024-02-24 PROCEDURE — 71046 X-RAY EXAM CHEST 2 VIEWS: CPT

## 2024-02-24 PROCEDURE — 99284 EMERGENCY DEPT VISIT MOD MDM: CPT

## 2024-02-24 PROCEDURE — 86140 C-REACTIVE PROTEIN: CPT | Performed by: EMERGENCY MEDICINE

## 2024-02-24 PROCEDURE — 93005 ELECTROCARDIOGRAM TRACING: CPT

## 2024-02-24 PROCEDURE — 96361 HYDRATE IV INFUSION ADD-ON: CPT

## 2024-02-24 PROCEDURE — 94640 AIRWAY INHALATION TREATMENT: CPT

## 2024-02-24 PROCEDURE — 84484 ASSAY OF TROPONIN QUANT: CPT | Performed by: EMERGENCY MEDICINE

## 2024-02-24 PROCEDURE — 84145 PROCALCITONIN (PCT): CPT | Performed by: EMERGENCY MEDICINE

## 2024-02-24 PROCEDURE — 36415 COLL VENOUS BLD VENIPUNCTURE: CPT | Performed by: EMERGENCY MEDICINE

## 2024-02-24 PROCEDURE — 85610 PROTHROMBIN TIME: CPT | Performed by: EMERGENCY MEDICINE

## 2024-02-24 PROCEDURE — 85025 COMPLETE CBC W/AUTO DIFF WBC: CPT | Performed by: EMERGENCY MEDICINE

## 2024-02-24 PROCEDURE — 85730 THROMBOPLASTIN TIME PARTIAL: CPT | Performed by: EMERGENCY MEDICINE

## 2024-02-24 PROCEDURE — 83605 ASSAY OF LACTIC ACID: CPT | Performed by: EMERGENCY MEDICINE

## 2024-02-24 PROCEDURE — 71250 CT THORAX DX C-: CPT

## 2024-02-24 PROCEDURE — 96374 THER/PROPH/DIAG INJ IV PUSH: CPT

## 2024-02-24 PROCEDURE — 99285 EMERGENCY DEPT VISIT HI MDM: CPT | Performed by: EMERGENCY MEDICINE

## 2024-02-24 PROCEDURE — 87040 BLOOD CULTURE FOR BACTERIA: CPT | Performed by: EMERGENCY MEDICINE

## 2024-02-24 PROCEDURE — 0241U HB NFCT DS VIR RESP RNA 4 TRGT: CPT | Performed by: EMERGENCY MEDICINE

## 2024-02-24 PROCEDURE — 96375 TX/PRO/DX INJ NEW DRUG ADDON: CPT

## 2024-02-24 PROCEDURE — 99223 1ST HOSP IP/OBS HIGH 75: CPT | Performed by: FAMILY MEDICINE

## 2024-02-24 PROCEDURE — 80053 COMPREHEN METABOLIC PANEL: CPT | Performed by: EMERGENCY MEDICINE

## 2024-02-24 PROCEDURE — 86618 LYME DISEASE ANTIBODY: CPT | Performed by: EMERGENCY MEDICINE

## 2024-02-24 RX ORDER — ATENOLOL 50 MG/1
50 TABLET ORAL
Status: DISCONTINUED | OUTPATIENT
Start: 2024-02-24 | End: 2024-02-26 | Stop reason: HOSPADM

## 2024-02-24 RX ORDER — FUROSEMIDE 40 MG/1
40 TABLET ORAL 2 TIMES DAILY
Status: ON HOLD | COMMUNITY
End: 2024-02-26

## 2024-02-24 RX ORDER — VENLAFAXINE HYDROCHLORIDE 75 MG/1
75 CAPSULE, EXTENDED RELEASE ORAL DAILY
Status: DISCONTINUED | OUTPATIENT
Start: 2024-02-25 | End: 2024-02-25

## 2024-02-24 RX ORDER — POTASSIUM CHLORIDE 20 MEQ/1
40 TABLET, EXTENDED RELEASE ORAL ONCE
Status: COMPLETED | OUTPATIENT
Start: 2024-02-24 | End: 2024-02-24

## 2024-02-24 RX ORDER — DROPERIDOL 2.5 MG/ML
1.25 INJECTION, SOLUTION INTRAMUSCULAR; INTRAVENOUS ONCE
Status: COMPLETED | OUTPATIENT
Start: 2024-02-24 | End: 2024-02-24

## 2024-02-24 RX ORDER — KETOROLAC TROMETHAMINE 30 MG/ML
15 INJECTION, SOLUTION INTRAMUSCULAR; INTRAVENOUS ONCE
Status: COMPLETED | OUTPATIENT
Start: 2024-02-24 | End: 2024-02-24

## 2024-02-24 RX ORDER — PANTOPRAZOLE SODIUM 40 MG/1
40 TABLET, DELAYED RELEASE ORAL
Status: DISCONTINUED | OUTPATIENT
Start: 2024-02-25 | End: 2024-02-26 | Stop reason: HOSPADM

## 2024-02-24 RX ORDER — METHYLPREDNISOLONE SODIUM SUCCINATE 125 MG/2ML
80 INJECTION, POWDER, LYOPHILIZED, FOR SOLUTION INTRAMUSCULAR; INTRAVENOUS ONCE
Status: COMPLETED | OUTPATIENT
Start: 2024-02-24 | End: 2024-02-24

## 2024-02-24 RX ORDER — ASPIRIN 81 MG/1
81 TABLET, CHEWABLE ORAL DAILY
Status: DISCONTINUED | OUTPATIENT
Start: 2024-02-25 | End: 2024-02-26 | Stop reason: HOSPADM

## 2024-02-24 RX ORDER — SODIUM CHLORIDE 9 MG/ML
125 INJECTION, SOLUTION INTRAVENOUS CONTINUOUS
Status: DISCONTINUED | OUTPATIENT
Start: 2024-02-24 | End: 2024-02-26

## 2024-02-24 RX ORDER — IPRATROPIUM BROMIDE AND ALBUTEROL SULFATE 2.5; .5 MG/3ML; MG/3ML
3 SOLUTION RESPIRATORY (INHALATION) ONCE
Status: COMPLETED | OUTPATIENT
Start: 2024-02-24 | End: 2024-02-24

## 2024-02-24 RX ORDER — HEPARIN SODIUM 5000 [USP'U]/ML
5000 INJECTION, SOLUTION INTRAVENOUS; SUBCUTANEOUS EVERY 8 HOURS SCHEDULED
Status: DISCONTINUED | OUTPATIENT
Start: 2024-02-24 | End: 2024-02-26 | Stop reason: HOSPADM

## 2024-02-24 RX ORDER — ACETAMINOPHEN 325 MG/1
650 TABLET ORAL EVERY 6 HOURS PRN
Status: DISCONTINUED | OUTPATIENT
Start: 2024-02-24 | End: 2024-02-26 | Stop reason: HOSPADM

## 2024-02-24 RX ADMIN — ATENOLOL 50 MG: 50 TABLET ORAL at 22:34

## 2024-02-24 RX ADMIN — HEPARIN SODIUM 5000 UNITS: 5000 INJECTION INTRAVENOUS; SUBCUTANEOUS at 22:34

## 2024-02-24 RX ADMIN — SODIUM CHLORIDE 125 ML/HR: 0.9 INJECTION, SOLUTION INTRAVENOUS at 22:34

## 2024-02-24 RX ADMIN — SODIUM CHLORIDE 500 ML: 0.9 INJECTION, SOLUTION INTRAVENOUS at 19:04

## 2024-02-24 RX ADMIN — METHYLPREDNISOLONE SODIUM SUCCINATE 80 MG: 125 INJECTION, POWDER, FOR SOLUTION INTRAMUSCULAR; INTRAVENOUS at 21:00

## 2024-02-24 RX ADMIN — POTASSIUM CHLORIDE 40 MEQ: 1500 TABLET, EXTENDED RELEASE ORAL at 21:01

## 2024-02-24 RX ADMIN — KETOROLAC TROMETHAMINE 15 MG: 30 INJECTION, SOLUTION INTRAMUSCULAR; INTRAVENOUS at 19:02

## 2024-02-24 RX ADMIN — IPRATROPIUM BROMIDE AND ALBUTEROL SULFATE 3 ML: 2.5; .5 SOLUTION RESPIRATORY (INHALATION) at 21:01

## 2024-02-24 RX ADMIN — DROPERIDOL 1.25 MG: 2.5 INJECTION, SOLUTION INTRAMUSCULAR; INTRAVENOUS at 19:03

## 2024-02-24 NOTE — ED PROVIDER NOTES
History  Chief Complaint   Patient presents with    Pain     Pt presents with generalized body pain x1 week. Sent to [facility] by PCP for stroke work up which was negative.      64-year-old female accompanied by friend/neighbor describes generalized, severe discomfort worse at neck and shoulders, also describes hallucinations when she looks at something and appears to be running or encroaching on her.  Note she was seen at [facility] emergency department yesterday and prescribed anxiolytic, but notes symptoms have persisted.  States she is smoking, coughing, no hemoptysis.  Increased exertional dyspnea.  No home oxygen use.      History provided by:  Patient  Flu Symptoms  Presenting symptoms: cough, fatigue, myalgias and nausea    Presenting symptoms: no diarrhea, no fever, no shortness of breath and no vomiting    Severity:  Severe  Onset quality:  Gradual  Progression:  Worsening  Chronicity:  New  Relieved by:  Nothing  Worsened by:  Nothing  Ineffective treatments:  OTC medications  Associated symptoms: decreased appetite and decreased physical activity    Associated symptoms: no chills, no mental status change, no congestion and no neck stiffness    Risk factors: diabetes    Risk factors: no immunocompromised state, no kidney disease and no liver disease        Prior to Admission Medications   Prescriptions Last Dose Informant Patient Reported? Taking?   ACETAMINOPHEN-CODEINE #3 PO   Yes No   Sig: Take 1 tablet by mouth every 4 (four) hours    ALPRAZolam (XANAX) 0.5 mg tablet   Yes No   Sig: Take by mouth daily at bedtime as needed for anxiety   Multiple Vitamins-Minerals (CENTRUM SILVER 50+MEN PO)   Yes No   Sig: Take 1 tablet by mouth   apixaban (ELIQUIS) 2.5 mg   No No   Sig: Take 1 tablet (2.5 mg total) by mouth 2 (two) times a day   aspirin (Aspirin 81) 81 mg chewable tablet   Yes No   Sig: Chew 81 mg   atenolol (TENORMIN) 50 mg tablet  Self Yes No   Sig: Take 50 mg by mouth daily at bedtime     diphenhydrAMINE (Allergy Relief) 25 mg tablet   Yes No   Sig: Take 25 mg by mouth daily   lisinopril (ZESTRIL) 20 mg tablet   Yes No   Sig: Take 20 mg by mouth daily    omeprazole (PriLOSEC) 20 mg delayed release capsule   Yes No   Sig: Take 20 mg by mouth daily   simvastatin (ZOCOR) 20 mg tablet   Yes No   Sig: Take 20 mg by mouth daily at bedtime   venlafaxine (EFFEXOR) 75 mg tablet   Yes No   Sig: Take 75 mg by mouth daily at bedtime    venlafaxine (EFFEXOR-XR) 150 mg 24 hr capsule   Yes No   Sig: Take 150 mg by mouth daily    vitamin B-12 (VITAMIN B-12) 1,000 mcg tablet   Yes No   Sig: Take by mouth daily      Facility-Administered Medications: None       Past Medical History:   Diagnosis Date    COPD (chronic obstructive pulmonary disease) (HCC)     Diverticulitis     GERD (gastroesophageal reflux disease)     Hypertension     Psychiatric disorder        Past Surgical History:   Procedure Laterality Date    ABDOMINAL SURGERY      CERVICAL SPINE SURGERY      rods placed in c4,c5,c6       History reviewed. No pertinent family history.  I have reviewed and agree with the history as documented.    E-Cigarette/Vaping    E-Cigarette Use Never User      E-Cigarette/Vaping Substances     Social History     Tobacco Use    Smoking status: Every Day     Current packs/day: 1.00     Types: Cigarettes    Smokeless tobacco: Never   Vaping Use    Vaping status: Never Used   Substance Use Topics    Alcohol use: Never    Drug use: Never       Review of Systems   Constitutional:  Positive for decreased appetite and fatigue. Negative for chills and fever.   HENT:  Negative for congestion.    Respiratory:  Positive for cough. Negative for shortness of breath.    Gastrointestinal:  Positive for nausea. Negative for diarrhea and vomiting.   Musculoskeletal:  Positive for myalgias. Negative for neck stiffness.   All other systems reviewed and are negative.      Physical Exam  Physical Exam  Vitals and nursing note reviewed.    Constitutional:       General: She is not in acute distress.     Comments: Pleasant, comfortable-appearing   HENT:      Head: Normocephalic and atraumatic.      Mouth/Throat:      Mouth: Mucous membranes are moist.      Pharynx: Oropharynx is clear.   Eyes:      Conjunctiva/sclera: Conjunctivae normal.      Pupils: Pupils are equal, round, and reactive to light.   Cardiovascular:      Rate and Rhythm: Normal rate and regular rhythm.      Heart sounds: Normal heart sounds.   Pulmonary:      Effort: Pulmonary effort is normal.      Breath sounds: Normal breath sounds.   Abdominal:      General: Bowel sounds are normal. There is no distension.      Palpations: Abdomen is soft.      Tenderness: There is no abdominal tenderness.   Musculoskeletal:         General: No deformity.      Cervical back: Neck supple.      Right lower leg: No edema.      Left lower leg: No edema.      Comments: Generalized tenderness at neck chest shoulders hips and thighs   Skin:     General: Skin is warm and dry.      Coloration: Skin is not jaundiced.      Findings: No bruising, lesion or rash.   Neurological:      General: No focal deficit present.      Mental Status: She is alert and oriented to person, place, and time.      Cranial Nerves: No cranial nerve deficit.      Coordination: Coordination normal.   Psychiatric:         Behavior: Behavior normal.         Thought Content: Thought content normal.         Judgment: Judgment normal.         Vital Signs  ED Triage Vitals   Temperature Pulse Respirations Blood Pressure SpO2   02/24/24 1843 02/24/24 1843 02/24/24 1843 02/24/24 1843 02/24/24 1843   98.1 °F (36.7 °C) (!) 109 18 155/86 94 %      Temp Source Heart Rate Source Patient Position - Orthostatic VS BP Location FiO2 (%)   02/24/24 1843 02/24/24 1843 02/24/24 1843 02/24/24 1843 --   Temporal Monitor Lying Right arm       Pain Score       02/24/24 1902       10 - Worst Possible Pain           Vitals:    02/24/24 1843 02/24/24 2013    BP: 155/86 157/74   Pulse: (!) 109 92   Patient Position - Orthostatic VS: Lying Lying         Visual Acuity      ED Medications  Medications   potassium chloride (Klor-Con M20) CR tablet 40 mEq (40 mEq Oral Not Given 2/24/24 2101)   sodium chloride 0.9 % bolus 500 mL (0 mL Intravenous Stopped 2/24/24 2004)   ketorolac (TORADOL) injection 15 mg (15 mg Intravenous Given 2/24/24 1902)   droperidol (INAPSINE) injection 1.25 mg (1.25 mg Intravenous Given 2/24/24 1903)   ipratropium-albuterol (DUO-NEB) 0.5-2.5 mg/3 mL inhalation solution 3 mL (3 mL Nebulization Given 2/24/24 2101)   methylPREDNISolone sodium succinate (Solu-MEDROL) injection 80 mg (80 mg Intravenous Given 2/24/24 2100)       Diagnostic Studies  Results Reviewed       Procedure Component Value Units Date/Time    Procalcitonin [[id number]]  (Normal) Collected: 02/24/24 1854    Lab Status: Final result Specimen: Blood from Arm, Left Updated: 02/24/24 2021     Procalcitonin 0.07 ng/ml     HS Troponin I 4hr [[id number]]     Lab Status: No result Specimen: Blood     FLU/RSV/COVID - if FLU/RSV clinically relevant [[id number]]  (Normal) Collected: 02/24/24 1854    Lab Status: Final result Specimen: Nares from Nasopharyngeal Swab Updated: 02/24/24 1947     SARS-CoV-2 Negative     INFLUENZA A PCR Negative     INFLUENZA B PCR Negative     RSV PCR Negative    Narrative:      FOR PEDIATRIC PATIENTS - copy/paste COVID Guidelines URL to browser: [email or web address]    SARS-CoV-2 assay is a Nucleic Acid Amplification assay intended for the  qualitative detection of nucleic acid from SARS-CoV-2 in nasopharyngeal  swabs. Results are for the presumptive identification of SARS-CoV-2 RNA.    Positive results are indicative of infection with SARS-CoV-2, the virus  causing COVID-19, but do not rule out bacterial infection or co-infection  with other viruses. Laboratories within the [place] and its  territories are  required to report all positive results to the appropriate  public health authorities. Negative results do not preclude SARS-CoV-2  infection and should not be used as the sole basis for treatment or other  patient management decisions. Negative results must be combined with  clinical observations, patient history, and epidemiological information.  This test has not been FDA cleared or approved.    This test has been authorized by FDA under an Emergency Use Authorization  (EUA). This test is only authorized for the duration of time the  declaration that circumstances exist justifying the authorization of the  emergency use of an in vitro diagnostic tests for detection of SARS-CoV-2  virus and/or diagnosis of COVID-19 infection under section 564(b)(1) of  the Act, 21 U.S.C. 360bbb-3(b)(1), unless the authorization is terminated  or revoked sooner. The test has been validated but independent review by FDA  and CLIA is pending.    Test performed using [employer] GeneXpert: This RT-PCR assay targets N2,  a region unique to SARS-CoV-2. A conserved region in the E-gene was chosen  for pan-Sarbecovirus detection which includes SARS-CoV-2.    According to CMS-2020-01-R, this platform meets the definition of high-throughput technology.    Lactic acid [[id number]]  (Abnormal) Collected: 02/24/24 1854    Lab Status: Final result Specimen: Blood from Arm, Left Updated: 02/24/24 1938     LACTIC ACID 2.2 mmol/L     Narrative:      Result may be elevated if tourniquet was used during collection.    Lactic acid 2 Hours [[id number]]     Lab Status: No result Specimen: Blood     Comprehensive metabolic panel [[id number]]  (Abnormal) Collected: 02/24/24 1854    Lab Status: Final result Specimen: Blood from Arm, Left Updated: 02/24/24 1937     Sodium 132 mmol/L      Potassium 3.0 mmol/L      Chloride 97 mmol/L      CO2 27 mmol/L      ANION GAP 8 mmol/L      BUN 9 mg/dL      Creatinine 0.68 mg/dL      Glucose 117 mg/dL      Calcium 9.8 mg/dL       AST 44 U/L      ALT 27 U/L      Alkaline Phosphatase 84 U/L      Total Protein 8.2 g/dL      Albumin 4.5 g/dL      Total Bilirubin 2.19 mg/dL      eGFR 91 ml/min/1.73sq m     Narrative:      National Kidney Disease Foundation guidelines for Chronic Kidney Disease (CKD):     Stage 1 with normal or high GFR (GFR > 90 mL/min/1.73 square meters)    Stage 2 Mild CKD (GFR = 60-89 mL/min/1.73 square meters)    Stage 3A Moderate CKD (GFR = 45-59 mL/min/1.73 square meters)    Stage 3B Moderate CKD (GFR = 30-44 mL/min/1.73 square meters)    Stage 4 Severe CKD (GFR = 15-29 mL/min/1.73 square meters)    Stage 5 End Stage CKD (GFR <15 mL/min/1.73 square meters)  Note: GFR calculation is accurate only with a steady state creatinine    HS Troponin 0hr (reflex protocol) [[id number]]  (Normal) Collected: 02/24/24 1854    Lab Status: Final result Specimen: Blood from Arm, Left Updated: 02/24/24 1935     hs TnI 0hr 8 ng/L     HS Troponin I 2hr [[id number]]     Lab Status: No result Specimen: Blood     C-reactive protein [[id number]]  (Abnormal) Collected: 02/24/24 1856    Lab Status: Final result Specimen: Blood from Hand, Left Updated: 02/24/24 1930     CRP 74.7 mg/L     CK [[id number]]  (Abnormal) Collected: 02/24/24 1856    Lab Status: Final result Specimen: Blood from Hand, Left Updated: 02/24/24 1930     Total  U/L     Protime-INR [[id number]]  (Normal) Collected: 02/24/24 1854    Lab Status: Final result Specimen: Blood from Arm, Left Updated: 02/24/24 1928     Protime 14.5 seconds      INR 1.10    APTT [[id number]]  (Abnormal) Collected: 02/24/24 1854    Lab Status: Final result Specimen: Blood from Arm, Left Updated: 02/24/24 1928     PTT 50 seconds     CBC and differential [[id number]]  (Abnormal) Collected: 02/24/24 1854    Lab Status: Final result Specimen: Blood from Arm, Left Updated: 02/24/24 1908     WBC 11.45 Thousand/uL      RBC 4.64 Million/uL      Hemoglobin 13.7 g/dL      Hematocrit 41.1 %      MCV 89 fL       MCH 29.5 pg      MCHC 33.3 g/dL      RDW 14.9 %      MPV 9.7 fL      Platelets 203 Thousands/uL      nRBC 0 /100 WBCs      Neutrophils Relative 71 %      Immat GRANS % 1 %      Lymphocytes Relative 20 %      Monocytes Relative 7 %      Eosinophils Relative 1 %      Basophils Relative 0 %      Neutrophils Absolute 8.14 Thousands/µL      Immature Grans Absolute 0.06 Thousand/uL      Lymphocytes Absolute 2.33 Thousands/µL      Monocytes Absolute 0.81 Thousand/µL      Eosinophils Absolute 0.06 Thousand/µL      Basophils Absolute 0.05 Thousands/µL     [place]yme Total AB W Reflex to IGM/IGG [[id number]] Collected: 02/24/24 1856    Lab Status: In process Specimen: Blood from Hand, Left Updated: 02/24/24 1907    Narrative:      The following orders were created for panel order Lyme Total AB W Reflex to IGM/IGG.  Procedure                               Abnormality         Status                     ---------                               -----------         ------                     Lyme Total AB W Reflex t...[[id number]]                      In process                   Please view results for these tests on the individual orders.    Lyme Total AB W Reflex to IGM/IGG [[id number]] Collected: 02/24/24 1856    Lab Status: In process Specimen: Blood from Hand, Left Updated: 02/24/24 1907    Blood culture #1 [[id number]] Collected: 02/24/24 1856    Lab Status: In process Specimen: Blood from Hand, Left Updated: 02/24/24 1901    Blood culture #2 [[id number]] Collected: 02/24/24 1854    Lab Status: In process Specimen: Blood from Arm, Left Updated: 02/24/24 1901    UA w Reflex to Microscopic w Reflex to Culture [[id number]]     Lab Status: No result Specimen: Urine                    CT chest without contrast   Final Result by Chris Quintana MD (02/24 2043)      Extensive bibasilar dependent atelectasis and areas of linear parenchymal scarring/fibrosis. No lobar airspace consolidation to suggest pneumonia. No suspicious pulmonary  parenchymal mass. No mediastinal or hilar lymphadenopathy by size criteria.         Workstation performed: [id number]         XR chest pa & lateral    (Results Pending)              Procedures  Procedures         ED Course  ED Course as of 02/24/24 2111   Sat Feb 24, 2024 1928 WBC(!): 11.45   1928 XR chest pa & lateral  Questionable basilar infiltrates   1934 EKG 7:22 PM normal sinus rhythm rate 97 normal axis normal intervals no ST elevation or depression interpreted by me   1948 Total CK(!): 640   1948 C-REACTIVE PROTEIN(!): 74.7   1948 hs TnI 0hr: 8   1948 Sodium(!): 132   1948 Potassium(!): 3.0   1948 Total Bilirubin(!): 2.19   2044 LACTIC ACID(!!): 2.2   2044 Procalcitonin: 0.07                               SBIRT 22yo+      Flowsheet Row Most Recent Value   Initial Alcohol Screen: US AUDIT-C     1. How often do you have a drink containing alcohol? 0 Filed at: 02/24/2024 1841   2. How many drinks containing alcohol do you have on a typical day you are drinking?  0 Filed at: 02/24/2024 1841   3b. FEMALE Any Age, or MALE 65+: How often do you have 4 or more drinks on one occassion? 0 Filed at: 02/24/2024 1841   Audit-C Score 0 Filed at: 02/24/2024 1841   MIKE: How many times in the past year have you...    Used an illegal drug or used a prescription medication for non-medical reasons? Never Filed at: 02/24/2024 1841                      Medical Decision Making  Amount and/or Complexity of Data Reviewed  Labs: ordered. Decision-making details documented in ED Course.  Radiology: ordered and independent interpretation performed. Decision-making details documented in ED Course.  ECG/medicine tests: ordered and independent interpretation performed. Decision-making details documented in ED Course.    Risk  Prescription drug management.  Decision regarding hospitalization.             Disposition  Final diagnoses:   Myalgia   Hypoxemia   COPD (chronic obstructive pulmonary disease) (HCC)     Time reflects when  diagnosis was documented in both MDM as applicable and the Disposition within this note       Time User Action Codes Description Comment    2/24/2024  8:52 PM Dillon Kee [M79.10] Myalgia     2/24/2024  8:52 PM Dillon Kee [R09.02] Hypoxemia     2/24/2024  8:52 PM Dillon Kee [J44.9] COPD (chronic obstructive pulmonary disease) (HCC)           ED Disposition       ED Disposition   Admit    Condition   Stable    Date/Time   Sat Feb 24, 2024 2111    Comment   Case was discussed with Dr. Caldwell and the patient's admission status was agreed to be Admission Status: observation status to the service of Dr. Caldwell.                 Follow-up Information    None         Patient's Medications   Discharge Prescriptions    No medications on file       No discharge procedures on file.    PDMP Review         Value Time User    PDMP Reviewed  Yes 11/24/2020 11:31 AM Vijay Diaz MD            ED Provider  Electronically Signed by             Dillon Kee DO  02/24/24 2111

## 2024-02-25 LAB
ALBUMIN SERPL BCP-MCNC: 3.8 G/DL (ref 3.5–5)
ALP SERPL-CCNC: 69 U/L (ref 34–104)
ALT SERPL W P-5'-P-CCNC: 21 U/L (ref 7–52)
ANION GAP SERPL CALCULATED.3IONS-SCNC: 7 MMOL/L
AST SERPL W P-5'-P-CCNC: 31 U/L (ref 13–39)
ATRIAL RATE: 97 BPM
BILIRUB SERPL-MCNC: 2.11 MG/DL (ref 0.2–1)
BILIRUB UR QL STRIP: NEGATIVE
BUN SERPL-MCNC: 11 MG/DL (ref 5–25)
CALCIUM SERPL-MCNC: 8.9 MG/DL (ref 8.4–10.2)
CHLORIDE SERPL-SCNC: 103 MMOL/L (ref 96–108)
CK SERPL-CCNC: 319 U/L (ref 26–192)
CLARITY UR: CLEAR
CO2 SERPL-SCNC: 27 MMOL/L (ref 21–32)
COLOR UR: YELLOW
CREAT SERPL-MCNC: 0.71 MG/DL (ref 0.6–1.3)
ERYTHROCYTE [DISTWIDTH] IN BLOOD BY AUTOMATED COUNT: 14.9 % (ref 11.6–15.1)
GFR SERPL CREATININE-BSD FRML MDRD: 89 ML/MIN/1.73SQ M
GLUCOSE SERPL-MCNC: 176 MG/DL (ref 65–140)
GLUCOSE UR STRIP-MCNC: NEGATIVE MG/DL
HCT VFR BLD AUTO: 38.7 % (ref 34.8–46.1)
HGB BLD-MCNC: 12.7 G/DL (ref 11.5–15.4)
HGB UR QL STRIP.AUTO: NEGATIVE
KETONES UR STRIP-MCNC: NEGATIVE MG/DL
LEUKOCYTE ESTERASE UR QL STRIP: NEGATIVE
MAGNESIUM SERPL-MCNC: 1.5 MG/DL (ref 1.9–2.7)
MCH RBC QN AUTO: 29.6 PG (ref 26.8–34.3)
MCHC RBC AUTO-ENTMCNC: 32.8 G/DL (ref 31.4–37.4)
MCV RBC AUTO: 90 FL (ref 82–98)
NITRITE UR QL STRIP: NEGATIVE
P AXIS: 59 DEGREES
PH UR STRIP.AUTO: 6.5 [PH]
PLATELET # BLD AUTO: 192 THOUSANDS/UL (ref 149–390)
PMV BLD AUTO: 10.1 FL (ref 8.9–12.7)
POTASSIUM SERPL-SCNC: 4 MMOL/L (ref 3.5–5.3)
PR INTERVAL: 146 MS
PROT SERPL-MCNC: 6.9 G/DL (ref 6.4–8.4)
PROT UR STRIP-MCNC: NEGATIVE MG/DL
QRS AXIS: 40 DEGREES
QRSD INTERVAL: 82 MS
QT INTERVAL: 330 MS
QTC INTERVAL: 419 MS
RBC # BLD AUTO: 4.29 MILLION/UL (ref 3.81–5.12)
SODIUM SERPL-SCNC: 137 MMOL/L (ref 135–147)
SP GR UR STRIP.AUTO: 1.01 (ref 1–1.03)
T WAVE AXIS: 18 DEGREES
UROBILINOGEN UR QL STRIP.AUTO: 0.2 E.U./DL
VENTRICULAR RATE: 97 BPM
VIT B12 SERPL-MCNC: 1285 PG/ML (ref 180–914)
WBC # BLD AUTO: 9.21 THOUSAND/UL (ref 4.31–10.16)

## 2024-02-25 PROCEDURE — 83735 ASSAY OF MAGNESIUM: CPT | Performed by: FAMILY MEDICINE

## 2024-02-25 PROCEDURE — 82550 ASSAY OF CK (CPK): CPT | Performed by: FAMILY MEDICINE

## 2024-02-25 PROCEDURE — 80053 COMPREHEN METABOLIC PANEL: CPT | Performed by: FAMILY MEDICINE

## 2024-02-25 PROCEDURE — 97162 PT EVAL MOD COMPLEX 30 MIN: CPT

## 2024-02-25 PROCEDURE — 85027 COMPLETE CBC AUTOMATED: CPT | Performed by: FAMILY MEDICINE

## 2024-02-25 PROCEDURE — 82607 VITAMIN B-12: CPT | Performed by: FAMILY MEDICINE

## 2024-02-25 PROCEDURE — 93010 ELECTROCARDIOGRAM REPORT: CPT | Performed by: INTERNAL MEDICINE

## 2024-02-25 PROCEDURE — 81003 URINALYSIS AUTO W/O SCOPE: CPT | Performed by: EMERGENCY MEDICINE

## 2024-02-25 PROCEDURE — 99232 SBSQ HOSP IP/OBS MODERATE 35: CPT | Performed by: FAMILY MEDICINE

## 2024-02-25 RX ORDER — LORATADINE 10 MG/1
10 TABLET ORAL DAILY
Status: DISCONTINUED | OUTPATIENT
Start: 2024-02-25 | End: 2024-02-26 | Stop reason: HOSPADM

## 2024-02-25 RX ORDER — TRAZODONE HYDROCHLORIDE 50 MG/1
50 TABLET ORAL
Status: DISCONTINUED | OUTPATIENT
Start: 2024-02-25 | End: 2024-02-26 | Stop reason: HOSPADM

## 2024-02-25 RX ORDER — MAGNESIUM SULFATE HEPTAHYDRATE 40 MG/ML
2 INJECTION, SOLUTION INTRAVENOUS ONCE
Status: COMPLETED | OUTPATIENT
Start: 2024-02-25 | End: 2024-02-25

## 2024-02-25 RX ORDER — VENLAFAXINE HYDROCHLORIDE 150 MG/1
150 CAPSULE, EXTENDED RELEASE ORAL DAILY
Status: DISCONTINUED | OUTPATIENT
Start: 2024-02-25 | End: 2024-02-26 | Stop reason: HOSPADM

## 2024-02-25 RX ADMIN — NICOTINE 1 PATCH: 7 PATCH, EXTENDED RELEASE TRANSDERMAL at 10:24

## 2024-02-25 RX ADMIN — HEPARIN SODIUM 5000 UNITS: 5000 INJECTION INTRAVENOUS; SUBCUTANEOUS at 14:08

## 2024-02-25 RX ADMIN — ATENOLOL 50 MG: 50 TABLET ORAL at 21:16

## 2024-02-25 RX ADMIN — SODIUM CHLORIDE 125 ML/HR: 0.9 INJECTION, SOLUTION INTRAVENOUS at 17:05

## 2024-02-25 RX ADMIN — HEPARIN SODIUM 5000 UNITS: 5000 INJECTION INTRAVENOUS; SUBCUTANEOUS at 05:28

## 2024-02-25 RX ADMIN — LORATADINE 10 MG: 10 TABLET ORAL at 10:24

## 2024-02-25 RX ADMIN — CYANOCOBALAMIN TAB 500 MCG 1000 MCG: 500 TAB at 10:23

## 2024-02-25 RX ADMIN — ACETAMINOPHEN 650 MG: 325 TABLET, FILM COATED ORAL at 14:39

## 2024-02-25 RX ADMIN — VENLAFAXINE HYDROCHLORIDE 150 MG: 150 CAPSULE, EXTENDED RELEASE ORAL at 10:23

## 2024-02-25 RX ADMIN — ASPIRIN 81 MG CHEWABLE TABLET 81 MG: 81 TABLET CHEWABLE at 10:23

## 2024-02-25 RX ADMIN — Medication 2 G: at 14:39

## 2024-02-25 RX ADMIN — PANTOPRAZOLE SODIUM 40 MG: 40 TABLET, DELAYED RELEASE ORAL at 05:28

## 2024-02-25 RX ADMIN — Medication 2 G: at 21:16

## 2024-02-25 RX ADMIN — TRAZODONE HYDROCHLORIDE 50 MG: 50 TABLET ORAL at 21:16

## 2024-02-25 RX ADMIN — HEPARIN SODIUM 5000 UNITS: 5000 INJECTION INTRAVENOUS; SUBCUTANEOUS at 21:17

## 2024-02-25 RX ADMIN — MAGNESIUM SULFATE HEPTAHYDRATE 2 G: 40 INJECTION, SOLUTION INTRAVENOUS at 07:18

## 2024-02-25 NOTE — ASSESSMENT & PLAN NOTE
Her myalgias started 2 weeks ago, which would be around the time that she was using a snowblower when the region got heavy stone.  CK is 640, will do IV fluids  Repeat CK in the morning improving her myalgias are improving but she still has pain at her trapezius and neck hard time moving it around will try to avoid muscle relaxants to avoid any side effects of medication discussed with her we will place her on Voltaren gel 4 times a day repeat laboratories tomorrow

## 2024-02-25 NOTE — ASSESSMENT & PLAN NOTE
Apparently she has not slept for the past 10 days she uses Xanax as needed and it has not helped her about the night previously she took 2 of them and she slept for 5 hours.  Discussed with her trial of trazodone 50 mg at night to see if it helps her insomnia she has a lot of stress currently she does not have any hallucinations but discussed with her that lack of sleep could promote to have hallucinations.

## 2024-02-25 NOTE — PHYSICAL THERAPY NOTE
PHYSICAL THERAPY EVALUATION      NAME:  Luba Winkler  DATE: 02/25/24    AGE:   64 y.o.  Mrn:   [id number]  ADMIT DX:  Hypoxemia [R09.02]  COPD (chronic obstructive pulmonary disease) (Formerly [facility]) [J44.9]  Myalgia [M79.10]  Pain [R52]  Non-traumatic rhabdomyolysis [M62.82]    Past Medical History:   Diagnosis Date    COPD (chronic obstructive pulmonary disease) (Formerly [facility])     Diverticulitis     GERD (gastroesophageal reflux disease)     Hypertension     Psychiatric disorder      Length Of Stay: 0  Performed at least 2 patient identifiers during session: Name and Birthday  PHYSICAL THERAPY EVALUATION:      02/25/24 1035   PT Last Visit   PT Visit Date 02/25/24   Note Type   Note type Evaluation   Pain Assessment   Pain Assessment Tool FLACC   Pain Location/Orientation Location: Neck;Location: Shoulder;Orientation: Bilateral   Pain Rating: FLACC (Rest) - Face 0   Pain Rating: FLACC (Rest) - Legs 0   Pain Rating: FLACC (Rest) - Activity 0   Pain Rating: FLACC (Rest) - Cry 0   Pain Rating: FLACC (Rest) - Consolability 0   Score: FLACC (Rest) 0   Pain Rating: FLACC (Activity) - Face 1   Pain Rating: FLACC (Activity) - Legs 0   Pain Rating: FLACC (Activity) - Activity 0   Pain Rating: FLACC (Activity) - Cry 0   Pain Rating: FLACC (Activity) - Consolability 0   Score: FLACC (Activity) 1   Restrictions/Precautions   Weight Bearing Precautions Per Order No   Other Precautions O2;Fall Risk;Chair Alarm;Bed Alarm;Multiple lines  (2L O2 with SpO2 at 94%)   Home Living   Type of Home House  (Bilevel)   Home Layout Two level  (Stair glide but pt does not always utilize; 6 + 6 steps L railing)   Bathroom Shower/Tub Walk-in shower   Bathroom Toilet Raised   Bathroom Equipment Grab bars in shower;Shower chair  (pt always stands to shower)   Bathroom Accessibility Accessible   Home Equipment Walker;Cane   Additional Comments No home O2 use   Prior Function   Level of [place] Independent with ADLs;Independent with functional  "mobility;Independent with IADLS   Lives With Spouse  (has Deidre Gehrig's Disease (pt is primary caregiver for spouse))   Receives Help From Neighbor   IADLs Independent with driving;Independent with meal prep;Independent with medication management   Falls in the last 6 months 0   Vocational Retired   Comments IND no AD   General   Family/Caregiver Present Yes  (neighbor)   Cognition   Overall Cognitive Status WFL   Arousal/Participation Cooperative   Orientation Level Oriented X4   Following Commands Follows one step commands without difficulty   Subjective   Subjective \"I'm doing better now, but I need to see if I hallucinate once I'm back home; who knows if I will again in my own environment ya know\"   RLE Assessment   RLE Assessment WFL   LLE Assessment   LLE Assessment WFL   Bed Mobility   Supine to Sit 6  Modified independent   Transfers   Sit to Stand 5  Supervision   Stand to Sit 5  Supervision   Stand pivot 5  Supervision   Additional items Verbal cues   Additional Comments No AD   Ambulation/Elevation   Gait pattern Step through pattern   Gait Assistance 5  Supervision   Additional items Verbal cues   Assistive Device None   Distance 160 ft   Balance   Static Sitting Good   Dynamic Sitting Good   Static Standing Fair +   Dynamic Standing Fair   Ambulatory Fair   Endurance Deficit   Endurance Deficit Yes   Endurance Deficit Description Mild GRIFFIN with SpO2 at 88% on Room Air   Activity Tolerance   Activity Tolerance Patient limited by fatigue   Nurse Made Aware Yes   Assessment   Prognosis Good   Problem List Decreased strength;Decreased endurance;Impaired balance;Decreased mobility;Obesity   Barriers to Discharge Other (Comment)   Barriers to Discharge Comments Lives with spouse who has Deidre Gehrig's Disease - she is the primary caregiver   Goals   [facility] Expiration Date 03/10/24   PT Treatment Day 0   Plan   Treatment/Interventions Functional transfer training;LE strengthening/ROM;Elevations;Therapeutic " exercise;Endurance training;Patient/family training;Equipment eval/education;Gait training;Compensatory technique education;Spoke to nursing;Spoke to case management   PT Frequency 2-3x/wk   Discharge Recommendation   Rehab Resource Intensity Level, PT III (Minimum Resource Intensity)   AM-PAC Basic Mobility Inpatient   Turning in Flat Bed Without Bedrails 4   Lying on Back to Sitting on Edge of Flat Bed Without Bedrails 4   Moving Bed to Chair 3   Standing Up From Chair Using Arms 3   Walk in Room 3   Climb 3-5 Stairs With Railing 3   Basic Mobility Inpatient Raw Score 20   Basic Mobility Standardized Score 43.99   Highest Level Of Mobility   JH-HLM Goal 6: Walk 10 steps or more   JH-HLM Achieved 7: Walk 25 feet or more   End of Consult   Patient Position at End of Consult Bedside chair;Bed/Chair alarm activated;All needs within reach   End of Consult Comments 2L O2     (Please find full objective findings from PT assessment regarding body systems outlined above).     Assessment: Pt is a 64 y.o. female seen for PT evaluation s/p admission to [facility] on 2/24/2024 with Non-traumatic rhabdomyolysis.  Order placed for PT services.  Upon evaluation: Pt is presenting with impaired functional mobility due to decreased strength, decreased endurance, impaired balance, fall risk, and LE edema requiring  SPV assistance for transfers and ambulation with no AD . Pt's clinical presentation is currently evolving given the functional mobility deficits above, especially decreased activity tolerance, coupled with fall risks as indicated by AM-PAC 6-Clicks: 20/24 as well as obesity and combined with medical complications of abnormal blood sugars and need for input for mobility technique/safety.  Pt's PMHx and comorbidities that may affect physical performance and progress include: anxiety, COPD, and HTN. Personal factors affecting pt at time of IE include: anxiety, depression, and stress at home . Pt will  benefit from continued skilled PT services to address deficits as defined above and to maximize level of functional mobility to facilitate return toward PLOF and improved QOL. From PT/mobility standpoint, recommendation at time of d/c would be Level III (Minimum Resource Intensity) pending progress in order to reduce fall risk and maximize pt's functional independence and consistency with mobility in order to facilitate return to PLOF.  Recommend ther ex next 1-2 sessions.     The patient's AM-PAC Basic Mobility Inpatient Short Form Raw Score is 20. A Raw score of greater than 16 suggests the patient may benefit from discharge to home. Please also refer to the recommendation of the Physical Therapist for safe discharge planning.       Goals: Pt will: Pt will perform transfers with modified I to increase Indep in home environment and prepare for ambulation. Pt will ambulate with no AD for >/= 250 ft with  modified I  to decrease risk for falls and improve activity tolerance and to access home environment. Pt will complete >/= 12 steps with with unilateral handrail with modified I to improve activity tolerance. Pt will participate in objective balance assessment to determine baseline fall risk.        Thaddeus Head, PT,DPT

## 2024-02-25 NOTE — ASSESSMENT & PLAN NOTE
Not in exacerbation but she does require oxygen she has significant atelectasis for which I will prescribe incentive spirometry

## 2024-02-25 NOTE — ASSESSMENT & PLAN NOTE
Had procedure done at [facility] she states and it was a long time ago.  Do not have any records at this time.  She states she is on Effexor for this to help with serotonin.

## 2024-02-25 NOTE — ASSESSMENT & PLAN NOTE
With somnolence and lethargy, and hallucinations  Unclear etiology, but will reduce her Effexor dose.  She recently got Ativan from her recent visit from the emergency room at [facility], will also discontinue that

## 2024-02-25 NOTE — ASSESSMENT & PLAN NOTE
With somnolence and lethargy, and hallucinations  Patient denied prior to coming and took 2 doses of Xanax and she said for 5 hours prior to that she has not slept in days she has been facing stress also received Ativan at [facility].  This is all suspected due to insomnia lack of sleep currently it has improved she has no hallucinations getting better discussed with her the most important thing is to have adequate sleep at night we discussed placing her on trazodone 50 mg at night instead of Xanax which is half to monitor for any serotonin syndrome with Effexor

## 2024-02-25 NOTE — CASE MANAGEMENT
Case Management Assessment & Discharge Planning Note    Patient name Luba Winkler  Location /-01 MRN [id number]  : 1960 Date 2024       Current Admission Date: 2024  Current Admission Diagnosis:Non-traumatic rhabdomyolysis   Patient Active Problem List    Diagnosis Date Noted    Non-traumatic rhabdomyolysis 2024    Myalgia 2024    Hallucinations 2024    Encephalopathy acute 2024    Stress at home 2024    Hyponatremia 2024    Hypokalemia 2024    COVID-19 virus infection 2020    Hypophosphatemia 2020    Hypomagnesemia 2020    Essential hypertension 2020    Gastroesophageal reflux disease without esophagitis 2020    History of Chiari malformation 2020    Class 3 severe obesity due to excess calories in adult (HCC) 2020    Other hyperlipidemia 2020    History of COPD 2020    Current smoker 2020    Anxiety 2020      LOS (days): 0  Geometric Mean LOS (GMLOS) (days):   Days to GMLOS:     OBJECTIVE:    Risk of Unplanned Readmission Score: 9.04         Current admission status: Inpatient       Preferred Pharmacy:   [facility]/pharmacy #[id number] [place] [address]  Phone: [phone] Fax: [phone]    Primary Care Provider: Jabier Cabrales DO    Primary Insurance: MEDICARE  Secondary Insurance: AARP    ASSESSMENT:  Active Health Care Proxies    There are no active Health Care Proxies on file.       Advance Directives  Does patient have a Health Care POA?: No  Was patient offered paperwork?: Yes (declined)  Does patient currently have a Health Care decision maker?: No  Does patient have Advance Directives?: No  Was patient offered paperwork?: Yes (declined)  Primary Contact: , Martinez              Patient Information  Admitted from:: Home  Mental Status: Alert  During Assessment patient was accompanied by: Not  accompanied during assessment  Assessment information provided by:: Patient  Primary Caregiver: Self  Support Systems: Spouse/significant other, Family members, Friends/neighbors  County of Residence: [facility]  Home entry access options. Select all that apply.: Stairs  Number of steps to enter home.:  (6+6, pt has stair glide)  Type of Current Residence: [address] [place] home  Upon entering residence, is there a bedroom on the main floor (no further steps)?: Yes  Upon entering residence, is there a bathroom on the main floor (no further steps)?: Yes  Living Arrangements: Lives w/ Spouse/significant other    Activities of Daily Living Prior to Admission  Functional Status: Independent  Completes ADLs independently?: Yes  Ambulates independently?: Yes  Does patient use assisted devices?: No  Does patient currently own DME?: No  Does patient have a history of Outpatient Therapy (PT/OT)?: No  Does the patient have a history of Short-Term Rehab?: No  Does patient have a history of HHC?: No  Does patient currently have HHC?: No         Patient Information Continued  Income Source: SSI/SSD  Does patient have prescription coverage?: Yes  Does patient receive dialysis treatments?: No  Does patient have a history of substance abuse?: No  Does patient have a history of Mental Health Diagnosis?: No         Means of Transportation  Means of Transport to Appts:: Drives Self      Social Determinants of Health (SDOH)      Flowsheet Row Most Recent Value   Housing Stability    In the last 12 months, was there a time when you were not able to pay the mortgage or rent on time? N   In the last 12 months, how many places have you lived? 1   In the last 12 months, was there a time when you did not have a steady place to sleep or slept in a shelter (including now)? N   Transportation Needs    In the past 12 months, has lack of transportation kept you from medical appointments or from getting medications? no   In the past 12 months, has lack of  "transportation kept you from meetings, work, or from getting things needed for daily living? No   Food Insecurity    Within the past 12 months, you worried that your food would run out before you got the money to buy more. Never true   Within the past 12 months, the food you bought just didn't last and you didn't have money to get more. Never true   Utilities    In the past 12 months has the electric, gas, oil, or water company threatened to shut off services in your home? No            DISCHARGE DETAILS:    Discharge planning discussed with:: patient  Freedom of Choice: Yes     CM contacted family/caregiver?: No- see comments (declined)             Contacts  Patient Contacts: , suzette  Relationship to Patient:: Family               Pt is from home, IPTA.  PT drives.  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  During interview, pt appears anxious, easily distracted, histrionic at times.  CM asking pt assessment questions, not answering questions directly, pt continues to ruminate about her home and concerns for her  being cared for while she is in the hospital .  Pt informing CM that her neighbor is at her home, caring for  and pts son, Jarrell, is on his way from [place] to be with pts .  Pts thoughts appear disorganized, pt frequently putting her pillow in front of her face and pretending to cry, pt needing constant redirection.  CM asking pt for neighbor's name and phone number.  Neighbor's name is Keyanna [phone].  CM leaving pts room, placing call to Keyanna, who sts she is currently at the pts home, with her , pts  is fine.  Keyanna sts she will stay with  until his sn, Jarrell, arrives, at which time she is going to come to hospital and visit with pt and bring her items pt has requested.  CM returning to pts room to inform pt of the above, pt appears relieved and sts 'oh, yeah, I knew Keyanna was gonna be there.'  Pt next sts \"but I dont want Jarrell there, he's " "gonna ransack the house and take all of Tre's money.\"  CM asking pt is Jarrell and his son have a good relationship, pt sts ,es, he will take care of him.\"  Pt appears to switch from emotion of excitement to calm, quickly.  Pt again appears very disorganized in her thinking.    Pt sts she has two step children, Jarrell and Deidra, both available to assist in husbands care while she is in hospital.  Pt then sts \"but they just want his money\".  Pt sts Jarrell's phone number is [phone], asked CM not to call him at this time.    PT sts she can to the hospital because she was \"overwhelmed\" and \"tired.\"  Pt sts \"oh, I guess you dont need to hear all this, but I want to get a divorce, but I'm not sure, I dont want those kids taking my 's money.\"  CM asking additional questions about pts relationship with  and what care her  requires, pt does not answer questions, pt again puts pillow in front of her face and pretends to cry.  Nurse now entering pts room, pt now focused on nurse.  Cm to revisit with pt in the AM, to assess any further needs                                                                             "

## 2024-02-25 NOTE — ASSESSMENT & PLAN NOTE
Had procedure done at [facility] she states and it was a long time ago.  Do not have any records at this time.  She states she is on Effexor for this to help with serotonin.

## 2024-02-25 NOTE — ASSESSMENT & PLAN NOTE
Secondary to rhabdo.  Complains mostly of shoulder and neck area.  No hip pain.  She does have elevated CRP, but I do not think she has PMR.  Continue IV fluids improving and also will do Voltaren gel check an ESR

## 2024-02-25 NOTE — ASSESSMENT & PLAN NOTE
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

## 2024-02-25 NOTE — ASSESSMENT & PLAN NOTE
Patient with a potassium of 3 which was replaced with Klor-Con in the ER  Will repeat BMP in the morning

## 2024-02-25 NOTE — H&P
[facility]  H&P  Name: Luba Winkler 64 y.o. female I MRN: [id number]  Unit/Bed#: MS 234Justina I Date of Admission: 2/24/2024   Date of Service: 2/24/2024 I Hospital Day: 0      Assessment/Plan   * Non-traumatic rhabdomyolysis  Assessment & Plan  Her myalgias started 2 weeks ago, which would be around the time that she was using a snowblower when the region got heavy stone.  CK is 640, will do IV fluids  Repeat CK in the morning      Myalgia  Assessment & Plan  Secondary to rhabdo.  Complains mostly of shoulder and neck area.  No hip pain.  She does have elevated CRP, but I do not think she has PMR.  Continue IV fluids    Encephalopathy acute  Assessment & Plan  With somnolence and lethargy, and hallucinations  Unclear etiology, but will reduce her Effexor dose.  She recently got Ativan from her recent visit from the emergency room at [facility], will also discontinue that    Hallucinations  Assessment & Plan  Unclear etiology.  She states that is like people running or encroaching on her.    History of Chiari malformation  Assessment & Plan  Had procedure done at [facility] she states and it was a long time ago.  Do not have any records at this time.  She states she is on Effexor for this to help with serotonin.    Essential hypertension  Assessment & Plan  Continue Tylenol, will hold off on Lasix at this time    Gastroesophageal reflux disease without esophagitis  Assessment & Plan  Continue PPI    Other hyperlipidemia  Assessment & Plan  Will hold statin    Current smoker  Assessment & Plan  Nicotine patch    Stress at home  Assessment & Plan  Patient has a  lot of stress at home, she takes care of her  who has PLS (primary lateral sclerosis)    Hypokalemia  Assessment & Plan  Patient with a potassium of 3 which was replaced with Klor-Con in the ER  Will repeat BMP in the morning    Hyponatremia  Assessment & Plan  Will do IV fluids         Disposition  #1 IV  fluids  #2 discontinue Benadryl, Ativan and statin  #3 decrease Effexor to 75 mg  #4 CBC, BMP, CK level in the morning  #5 continue IV fluids        VTE Prophylaxis: Heparin  / sequential compression device   Code Status: Level 3 - DNAR and DNI       Anticipated Length of Stay:  Patient will be admitted on an Observation basis with an anticipated length of stay of less than 2 midnights.   Justification for Hospital Stay: Please see detailed plans noted above.    Chief Complaint:     Myalgias  History of Present Illness:  Luba Winkler is a 64 y.o. female who has past medical history significant for history of Chiari malformation status post surgery, hypertension, hyperlipidemia, acid reflux who presents to the hospital with her neighbor due to worsening myalgias and also lethargy for 2 weeks.  It started 2 weeks ago which at that time she was removing snow with a snowblower.  She complains of shoulder and neck pain.  She recently went to her PCP who sent her to the hospital due to possibility of a stroke.  She was sent home with a anxiolytic with Ativan.  Patient is a lot of stress at home as she takes care of her  who has PLS.  In the emergency room she was found to have his elevated CK level and some somnolence.  Unclear about the somnolence but she is on multiple medications including Benadryl and now having received Ativan for anxiety.  This could be contributing to her issue.  She also presents with some hallucinations which could be an effect of her Effexor      Review of Systems:    Constitutional: Feels tired  Eyes:  Denies change in visual acuity   HENT:  Denies nasal congestion or sore throat   Respiratory:  Denies cough or shortness of breath   Cardiovascular:  Denies chest pain or edema   GI:  Denies abdominal pain or bloody stools  :  Denies dysuria   Musculoskeletal: Shoulder and neck pain  Integument:  Denies rash   Neurologic: Hallucinations and somnolence  Endocrine:  Denies polyuria or  polydipsia   Lymphatic:  Denies swollen glands   Psychiatric: Anxiety    Past Medical and Surgical History:   Past Medical History:   Diagnosis Date    COPD (chronic obstructive pulmonary disease) (HCC)     Diverticulitis     GERD (gastroesophageal reflux disease)     Hypertension     Psychiatric disorder      Past Surgical History:   Procedure Laterality Date    ABDOMINAL SURGERY      CERVICAL SPINE SURGERY      rods placed in c4,c5,c6       Meds/Allergies:  Medications Prior to Admission   Medication Sig Dispense Refill Last Dose    ALPRAZolam (XANAX) 0.5 mg tablet Take by mouth daily at bedtime as needed for anxiety   2/23/2024    aspirin (Aspirin 81) 81 mg chewable tablet Chew 81 mg   2/24/2024    atenolol (TENORMIN) 50 mg tablet Take 50 mg by mouth daily at bedtime    2/23/2024    diphenhydrAMINE (Allergy Relief) 25 mg tablet Take 25 mg by mouth daily   2/23/2024    furosemide (Lasix) 40 mg tablet Take 40 mg by mouth 2 (two) times a day   2/23/2024    omeprazole (PriLOSEC) 20 mg delayed release capsule Take 20 mg by mouth daily   2/23/2024    simvastatin (ZOCOR) 20 mg tablet Take 20 mg by mouth daily at bedtime   2/23/2024    venlafaxine (EFFEXOR-XR) 150 mg 24 hr capsule Take 150 mg by mouth daily    2/24/2024    ACETAMINOPHEN-CODEINE #3 PO Take 1 tablet by mouth every 4 (four) hours        apixaban (ELIQUIS) 2.5 mg Take 1 tablet (2.5 mg total) by mouth 2 (two) times a day 60 tablet 0     lisinopril (ZESTRIL) 20 mg tablet Take 20 mg by mouth daily    Unknown    Multiple Vitamins-Minerals (CENTRUM SILVER 50+MEN PO) Take 1 tablet by mouth   Unknown    venlafaxine (EFFEXOR) 75 mg tablet Take 75 mg by mouth daily at bedtime  (Patient not taking: Reported on 2/24/2024)   Not Taking    vitamin B-12 (VITAMIN B-12) 1,000 mcg tablet Take by mouth daily   Unknown       Allergies:   Allergies   Allergen Reactions    Avelox [Moxifloxacin]     Celexa [Citalopram]     Dilaudid [Hydromorphone]     Dust Mite Extract Sneezing     Fentanyl Other (See Comments)    Penicillins      psych    Prednisone     Topiramate Nausea Only       History:  Marital Status: /Civil Union     Substance Use History:   Social History     Substance and Sexual Activity   Alcohol Use Never     Social History     Tobacco Use   Smoking Status Every Day    Current packs/day: 1.00    Types: Cigarettes   Smokeless Tobacco Never     Social History     Substance and Sexual Activity   Drug Use Never       Family History:  History reviewed. No pertinent family history.    Physical Exam:     Vitals:   Blood Pressure: 146/87 (02/24/24 2157)  Pulse: 98 (02/24/24 2157)  Temperature: 98.1 °F (36.7 °C) (02/24/24 1843)  Temp Source: Temporal (02/24/24 1843)  Respirations: 16 (02/24/24 2013)  SpO2: (!) 89 % (02/24/24 2157)    Constitutional:  Non-toxic appearance, somnolent  Eyes:  EOMI, No scleral icterus   HENT:   oropharynx moist, external ears normal, external nose normal   Respiratory:  No respiratory distress, no wheezing   Cardiovascular:  Normal rate, no murmurs   GI:  Soft, nondistended, no guarding   :  No costovertebral angle tenderness   Musculoskeletal:  no tenderness, no deformities.   Integument:  no jaundice, no rash   Neurologic:  Alert &awake, communicative, CN 2-12 normal,  no focal deficits noted   Psychiatric: Slow speech pattern      Lab Results: I have personally reviewed pertinent reports.   and I have personally reviewed pertinent films in PACS    Results from last 7 days   Lab Units 02/24/24  1854   WBC Thousand/uL 11.45*   HEMOGLOBIN g/dL 13.7   HEMATOCRIT % 41.1   PLATELETS Thousands/uL 203   NEUTROS PCT % 71   LYMPHS PCT % 20   MONOS PCT % 7   EOS PCT % 1     Results from last 7 days   Lab Units 02/24/24  1854   POTASSIUM mmol/L 3.0*   CHLORIDE mmol/L 97   CO2 mmol/L 27   BUN mg/dL 9   CREATININE mg/dL 0.68   CALCIUM mg/dL 9.8   ALK PHOS U/L 84   ALT U/L 27   AST U/L 44*     Results from last 7 days   Lab Units 02/24/24  1854   INR  1.10          Imaging: I have personally reviewed pertinent reports.   and I have personally reviewed pertinent films in PACS    CT chest without contrast    Result Date: 2/24/2024  Narrative: CT CHEST WITHOUT IV CONTRAST INDICATION: questionable bibasilar infiltrates. COMPARISON: 11/22/2020. TECHNIQUE: CT examination of the chest was performed without intravenous contrast. Multiplanar 2D reformatted images were created from the source data. This examination, like all CT scans performed in the [facility], was performed utilizing techniques to minimize radiation dose exposure, including the use of iterative reconstruction and automated exposure control. Radiation dose length product (DLP) for this visit: 759 mGy-cm FINDINGS: LUNGS: Central airways are patent. There is no tracheal or endobronchial lesion. There is extensive bibasilar atelectasis. Areas of linear parenchymal scarring/fibrosis in the lung bases are again noted. No lobar airspace consolidation or suspicious pulmonary parenchymal mass. PLEURA: Unremarkable without pneumothorax or pleural effusions. HEART/GREAT VESSELS: Heart is unremarkable for patient's age. No pericardial effusion. No thoracic aortic aneurysm or intramural hematoma. Mild calcific atherosclerosis of the aortic arch region is again seen. MEDIASTINUM AND INDERJIT: Unremarkable without mass or lymphadenopathy by size criteria. A few nonspecific subcentimeter mediastinal lymph nodes are seen. Visualized thyroid glands are grossly unremarkable. Esophagus is normal in course and caliber. No significant prevertebral soft tissue swelling. CHEST WALL AND LOWER NECK: Unremarkable. VISUALIZED STRUCTURES IN THE UPPER ABDOMEN: Unremarkable. OSSEOUS STRUCTURES: No acute fracture or destructive osseous lesion. Moderate multilevel degenerative changes throughout the thoracic spine. Lower cervical spinal fusion hardware partially visualized.     Impression: Extensive bibasilar dependent  atelectasis and areas of linear parenchymal scarring/fibrosis. No lobar airspace consolidation to suggest pneumonia. No suspicious pulmonary parenchymal mass. No mediastinal or hilar lymphadenopathy by size criteria. Workstation performed: [id number]     MDM: High  Requires hospitalization due to unclear etiology of rhabdomyolysis and her somnolence and hallucinations.  Unclear prognosis  Reviewed CBC, BMP, CK level, troponin level, lactic acid and C-reactive protein  Reviewed CT of the chest  Reviewed external notes from her PCP and hospitalization at [facility] and also records from plastic surgery  Repeat CBC, BMP, and CK in the morning    Epic Records Reviewed as well as Records in Care Everywhere    ** Please Note: Dragon 360 Dictation voice to text software was used in the creation of this document. **

## 2024-02-25 NOTE — ASSESSMENT & PLAN NOTE
Her myalgias started 2 weeks ago, which would be around the time that she was using a snowblower when the region got heavy stone.  CK is 640, will do IV fluids  Repeat CK in the morning

## 2024-02-25 NOTE — PLAN OF CARE
Problem: Prexisting or High Potential for Compromised Skin Integrity  Goal: Skin integrity is maintained or improved  Description: INTERVENTIONS:  - Identify patients at risk for skin breakdown  - Assess and monitor skin integrity  - Assess and monitor nutrition and hydration status  - Monitor labs   - Assess for incontinence   - Turn and reposition patient  - Assist with mobility/ambulation  - Relieve pressure over bony prominences  - Avoid friction and shearing  - Provide appropriate hygiene as needed including keeping skin clean and dry  - Evaluate need for skin moisturizer/barrier cream  - Collaborate with interdisciplinary team   - Patient/family teaching  - Consider wound care consult   Outcome: Progressing     Problem: PAIN - ADULT  Goal: Verbalizes/displays adequate comfort level or baseline comfort level  Description: Interventions:  - Encourage patient to monitor pain and request assistance  - Assess pain using appropriate pain scale  - Administer analgesics based on type and severity of pain and evaluate response  - Implement non-pharmacological measures as appropriate and evaluate response  - Consider cultural and social influences on pain and pain management  - Notify physician/advanced practitioner if interventions unsuccessful or patient reports new pain  Outcome: Progressing     Problem: INFECTION - ADULT  Goal: Absence or prevention of progression during hospitalization  Description: INTERVENTIONS:  - Assess and monitor for signs and symptoms of infection  - Monitor lab/diagnostic results  - Monitor all insertion sites, i.e. indwelling lines, tubes, and drains  - Monitor endotracheal if appropriate and nasal secretions for changes in amount and color  - Temple appropriate cooling/warming therapies per order  - Administer medications as ordered  - Instruct and encourage patient and family to use good hand hygiene technique  - Identify and instruct in appropriate isolation precautions for  identified infection/condition  Outcome: Progressing     Problem: SAFETY ADULT  Goal: Patient will remain free of falls  Description: INTERVENTIONS:  - Educate patient/family on patient safety including physical limitations  - Instruct patient to call for assistance with activity   - Consult OT/PT to assist with strengthening/mobility   - Keep Call bell within reach  - Keep bed low and locked with side rails adjusted as appropriate  - Keep care items and personal belongings within reach  - Initiate and maintain comfort rounds  - Make Fall Risk Sign visible to staff  - Offer Toileting every 2 Hours, in advance of need  - Initiate/Maintain bed alarm  - Obtain necessary fall risk management equipment  - Apply yellow socks and bracelet for high fall risk patients  - Consider moving patient to room near nurses station  Outcome: Progressing  Goal: Maintain or return to baseline ADL function  Description: INTERVENTIONS:  -  Assess patient's ability to carry out ADLs; assess patient's baseline for ADL function and identify physical deficits which impact ability to perform ADLs (bathing, care of mouth/teeth, toileting, grooming, dressing, etc.)  - Assess/evaluate cause of self-care deficits   - Assess range of motion  - Assess patient's mobility; develop plan if impaired  - Assess patient's need for assistive devices and provide as appropriate  - Encourage maximum independence but intervene and supervise when necessary  - Involve family in performance of ADLs  - Assess for home care needs following discharge   - Consider OT consult to assist with ADL evaluation and planning for discharge  - Provide patient education as appropriate  Outcome: Progressing  Goal: Maintains/Returns to pre admission functional level  Description: INTERVENTIONS:  - Perform AM-PAC 6 Click Basic Mobility/ Daily Activity assessment daily.  - Set and communicate daily mobility goal to care team and patient/family/caregiver.   - Collaborate with  rehabilitation services on mobility goals if consulted  - Reposition patient every 2 hours.  - Stand patient 3 times a day  - Ambulate patient 3 times a day  - Out of bed to chair 3 times a day   - Out of bed for meals 3 times a day  - Out of bed for toileting  - Record patient progress and toleration of activity level   Outcome: Progressing     Problem: DISCHARGE PLANNING  Goal: Discharge to home or other facility with appropriate resources  Description: INTERVENTIONS:  - Identify barriers to discharge w/patient and caregiver  - Arrange for needed discharge resources and transportation as appropriate  - Identify discharge learning needs (meds, wound care, etc.)  - Arrange for interpretive services to assist at discharge as needed  - Refer to Case Management Department for coordinating discharge planning if the patient needs post-hospital services based on physician/advanced practitioner order or complex needs related to functional status, cognitive ability, or social support system  Outcome: Progressing     Problem: Knowledge Deficit  Goal: Patient/family/caregiver demonstrates understanding of disease process, treatment plan, medications, and discharge instructions  Description: Complete learning assessment and assess knowledge base.  Interventions:  - Provide teaching at level of understanding  - Provide teaching via preferred learning methods  Outcome: Progressing     Problem: CARDIOVASCULAR - ADULT  Goal: Maintains optimal cardiac output and hemodynamic stability  Description: INTERVENTIONS:  - Monitor I/O, vital signs and rhythm  - Monitor for S/S and trends of decreased cardiac output  - Administer and titrate ordered vasoactive medications to optimize hemodynamic stability  - Assess quality of pulses, skin color and temperature  - Assess for signs of decreased coronary artery perfusion  - Instruct patient to report change in severity of symptoms  Outcome: Progressing  Goal: Absence of cardiac dysrhythmias or  at baseline rhythm  Description: INTERVENTIONS:  - Continuous cardiac monitoring, vital signs, obtain 12 lead EKG if ordered  - Administer antiarrhythmic and heart rate control medications as ordered  - Monitor electrolytes and administer replacement therapy as ordered  Outcome: Progressing     Problem: RESPIRATORY - ADULT  Goal: Achieves optimal ventilation and oxygenation  Description: INTERVENTIONS:  - Assess for changes in respiratory status  - Assess for changes in mentation and behavior  - Position to facilitate oxygenation and minimize respiratory effort  - Oxygen administered by appropriate delivery if ordered  - Initiate smoking cessation education as indicated  - Encourage broncho-pulmonary hygiene including cough, deep breathe, Incentive Spirometry  - Assess the need for suctioning and aspirate as needed  - Assess and instruct to report SOB or any respiratory difficulty  - Respiratory Therapy support as indicated  Outcome: Progressing     Problem: METABOLIC, FLUID AND ELECTROLYTES - ADULT  Goal: Electrolytes maintained within normal limits  Description: INTERVENTIONS:  - Monitor labs and assess patient for signs and symptoms of electrolyte imbalances  - Administer electrolyte replacement as ordered  - Monitor response to electrolyte replacements, including repeat lab results as appropriate  - Instruct patient on fluid and nutrition as appropriate  Outcome: Progressing  Goal: Fluid balance maintained  Description: INTERVENTIONS:  - Monitor labs   - Monitor I/O and WT  - Instruct patient on fluid and nutrition as appropriate  - Assess for signs & symptoms of volume excess or deficit  Outcome: Progressing  Goal: Glucose maintained within target range  Description: INTERVENTIONS:  - Monitor Blood Glucose as ordered  - Assess for signs and symptoms of hyperglycemia and hypoglycemia  - Administer ordered medications to maintain glucose within target range  - Assess nutritional intake and initiate nutrition  service referral as needed  Outcome: Progressing     Problem: MUSCULOSKELETAL - ADULT  Goal: Maintain or return mobility to safest level of function  Description: INTERVENTIONS:  - Assess patient's ability to carry out ADLs; assess patient's baseline for ADL function and identify physical deficits which impact ability to perform ADLs (bathing, care of mouth/teeth, toileting, grooming, dressing, etc.)  - Assess/evaluate cause of self-care deficits   - Assess range of motion  - Assess patient's mobility  - Assess patient's need for assistive devices and provide as appropriate  - Encourage maximum independence but intervene and supervise when necessary  - Involve family in performance of ADLs  - Assess for home care needs following discharge   - Consider OT consult to assist with ADL evaluation and planning for discharge  - Provide patient education as appropriate  Outcome: Progressing  Goal: Maintain proper alignment of affected body part  Description: INTERVENTIONS:  - Support, maintain and protect limb and body alignment  - Provide patient/ family with appropriate education  Outcome: Progressing

## 2024-02-25 NOTE — ASSESSMENT & PLAN NOTE
Secondary to rhabdo.  Complains mostly of shoulder and neck area.  No hip pain.  She does have elevated CRP, but I do not think she has PMR.  Continue IV fluids

## 2024-02-25 NOTE — PLAN OF CARE
Problem: PHYSICAL THERAPY ADULT  Goal: Performs mobility at highest level of function for planned discharge setting.  See evaluation for individualized goals.  Description: Treatment/Interventions: Functional transfer training, LE strengthening/ROM, Elevations, Therapeutic exercise, Endurance training, Patient/family training, Equipment eval/education, Gait training, Compensatory technique education, Spoke to nursing, Spoke to case management          See flowsheet documentation for full assessment, interventions and recommendations.  Note: Prognosis: Good  Problem List: Decreased strength, Decreased endurance, Impaired balance, Decreased mobility, Obesity  Assessment: Pt is a 64 y.o. female seen for PT evaluation s/p admission to [facility] on 2/24/2024 with Non-traumatic rhabdomyolysis.  Order placed for PT services.  Upon evaluation: Pt is presenting with impaired functional mobility due to decreased strength, decreased endurance, impaired balance, fall risk, and LE edema requiring  SPV assistance for transfers and ambulation with no AD . Pt's clinical presentation is currently evolving given the functional mobility deficits above, especially decreased activity tolerance, coupled with fall risks as indicated by AM-PAC 6-Clicks: 20/24 as well as obesity and combined with medical complications of abnormal blood sugars and need for input for mobility technique/safety.  Pt's PMHx and comorbidities that may affect physical performance and progress include: anxiety, COPD, and HTN. Personal factors affecting pt at time of IE include: anxiety, depression, and stress at home . Pt will benefit from continued skilled PT services to address deficits as defined above and to maximize level of functional mobility to facilitate return toward PLOF and improved QOL. From PT/mobility standpoint, recommendation at time of d/c would be Level III (Minimum Resource Intensity) pending progress in order to reduce  fall risk and maximize pt's functional independence and consistency with mobility in order to facilitate return to PLOF.  Recommend ther ex next 1-2 sessions.  Barriers to Discharge: Other (Comment)  Barriers to Discharge Comments: Lives with spouse who has Deidre Gehrig's Disease - she is the primary caregiver  Rehab Resource Intensity Level, PT: III (Minimum Resource Intensity)    See flowsheet documentation for full assessment.
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
## 2024-02-25 NOTE — PROGRESS NOTES
[facility]  Progress Note  Name: Luba Winkler I  MRN: [id number]  Unit/Bed#: -01 I Date of Admission: 2/24/2024   Date of Service: 2/25/2024 I Hospital Day: 0    Assessment/Plan   * Non-traumatic rhabdomyolysis  Assessment & Plan  Her myalgias started 2 weeks ago, which would be around the time that she was using a snowblower when the region got heavy stone.  CK is 640, will do IV fluids  Repeat CK in the morning improving her myalgias are improving but she still has pain at her trapezius and neck hard time moving it around will try to avoid muscle relaxants to avoid any side effects of medication discussed with her we will place her on Voltaren gel 4 times a day repeat laboratories tomorrow      Hypokalemia  Assessment & Plan  Patient with a potassium of 3 which was replaced with Klor-Con in the ER  Resolved with potassium    Hyponatremia  Assessment & Plan  Resolved with fluids    Stress at home  Assessment & Plan  Patient has a  lot of stress at home, she takes care of her  who has PLS (primary lateral sclerosis)    Encephalopathy acute  Assessment & Plan  With somnolence and lethargy, and hallucinations  Patient denied prior to coming and took 2 doses of Xanax and she said for 5 hours prior to that she has not slept in days she has been facing stress also received Ativan at [facility].  This is all suspected due to insomnia lack of sleep currently it has improved she has no hallucinations getting better discussed with her the most important thing is to have adequate sleep at night we discussed placing her on trazodone 50 mg at night instead of Xanax which is half to monitor for any serotonin syndrome with Effexor    Hallucinations  Assessment & Plan  Apparently she has not slept for the past 10 days she uses Xanax as needed and it has not helped her about the night previously she took 2 of them and she slept for 5 hours.  Discussed with her trial of trazodone 50 mg at  night to see if it helps her insomnia she has a lot of stress currently she does not have any hallucinations but discussed with her that lack of sleep could promote to have hallucinations.    Myalgia  Assessment & Plan  Secondary to rhabdo.  Complains mostly of shoulder and neck area.  No hip pain.  She does have elevated CRP, but I do not think she has PMR.  Continue IV fluids improving and also will do Voltaren gel check an ESR    Current smoker  Assessment & Plan  Nicotine patch    History of COPD  Assessment & Plan  Not in exacerbation but she does require oxygen she has significant atelectasis for which I will prescribe incentive spirometry    Other hyperlipidemia  Assessment & Plan  Will hold statin    History of Chiari malformation  Assessment & Plan  Had procedure done at [facility] she states and it was a long time ago.  Do not have any records at this time.  She states she is on Effexor for this to help with serotonin.    Gastroesophageal reflux disease without esophagitis  Assessment & Plan  Continue PPI    Essential hypertension  Assessment & Plan  Continue atenolol                 VTE Pharmacologic Prophylaxis: VTE Score: 2 Low Risk (Score 0-2) - Encourage Ambulation.    Mobility:   Basic Mobility Inpatient Raw Score: 20  JH-HLM Goal: 6: Walk 10 steps or more  JH-HLM Achieved: 7: Walk 25 feet or more  HLM Goal achieved. Continue to encourage appropriate mobility.    Patient Centered Rounds: I performed bedside rounds with nursing staff today.   Discussions with Specialists or Other Care Team Provider: none    Education and Discussions with Family / Patient: Updated  (friend) at bedside.    Total Time Spent on Date of Encounter in care of patient: >35 mins. This time was spent on one or more of the following: performing physical exam; counseling and coordination of care; obtaining or reviewing history; documenting in the medical record; reviewing/ordering tests, medications or procedures;  communicating with other healthcare professionals and discussing with patient's family/caregivers.    Current Length of Stay: 0 day(s)  Current Patient Status: Inpatient   Certification Statement: The patient will continue to require additional inpatient hospital stay due to rhabdomyolysis  Discharge Plan: Anticipate discharge tomorrow to home.    Code Status: Level 3 - DNAR and DNI    Subjective:   Patient seen and examined she is feeling better she still has aches in her neck but hallucinations have gone away    Objective:     Vitals:   Temp (24hrs), Av.1 °F (36.7 °C), Min:98.1 °F (36.7 °C), Max:98.1 °F (36.7 °C)    Temp:  [98.1 °F (36.7 °C)] 98.1 °F (36.7 °C)  HR:  [] 86  Resp:  [16-18] 18  BP: (120-158)/(61-87) 158/74  SpO2:  [88 %-98 %] 88 %  Body mass index is 39 kg/m².     Input and Output Summary (last 24 hours):     Intake/Output Summary (Last 24 hours) at 2024 1414  Last data filed at 2024 1008  Gross per 24 hour   Intake 222 ml   Output 950 ml   Net -728 ml       Physical Exam:   Physical Exam  Vitals and nursing note reviewed.   Constitutional:       General: She is not in acute distress.     Appearance: She is well-developed.   HENT:      Head: Normocephalic and atraumatic.   Eyes:      Conjunctiva/sclera: Conjunctivae normal.   Cardiovascular:      Rate and Rhythm: Normal rate and regular rhythm.      Heart sounds: No murmur heard.  Pulmonary:      Effort: Pulmonary effort is normal. No respiratory distress.      Breath sounds: Normal breath sounds. No wheezing or rales.   Abdominal:      General: Bowel sounds are normal. There is no distension.      Palpations: Abdomen is soft.      Tenderness: There is no abdominal tenderness.   Musculoskeletal:         General: No swelling.      Cervical back: Neck supple.   Skin:     General: Skin is warm and dry.      Capillary Refill: Capillary refill takes less than 2 seconds.   Neurological:      General: No focal deficit present.       Mental Status: She is alert and oriented to person, place, and time.   Psychiatric:         Mood and Affect: Mood normal.          Additional Data:     Labs:  Results from last 7 days   Lab Units 02/25/24  0513 02/24/24  1854   WBC Thousand/uL 9.21 11.45*   HEMOGLOBIN g/dL 12.7 13.7   HEMATOCRIT % 38.7 41.1   PLATELETS Thousands/uL 192 203   NEUTROS PCT %  --  71   LYMPHS PCT %  --  20   MONOS PCT %  --  7   EOS PCT %  --  1     Results from last 7 days   Lab Units 02/25/24  0513   SODIUM mmol/L 137   POTASSIUM mmol/L 4.0   CHLORIDE mmol/L 103   CO2 mmol/L 27   BUN mg/dL 11   CREATININE mg/dL 0.71   ANION GAP mmol/L 7   CALCIUM mg/dL 8.9   ALBUMIN g/dL 3.8   TOTAL BILIRUBIN mg/dL 2.11*   ALK PHOS U/L 69   ALT U/L 21   AST U/L 31   GLUCOSE RANDOM mg/dL 176*     Results from last 7 days   Lab Units 02/24/24  1854   INR  1.10             Results from last 7 days   Lab Units 02/24/24 2121 02/24/24 1854   LACTIC ACID mmol/L 1.0 2.2*   PROCALCITONIN ng/ml  --  0.07       Lines/Drains:  Invasive Devices       Peripheral Intravenous Line  Duration             Peripheral IV 02/25/24 Dorsal (posterior);Left Forearm <1 day    Peripheral IV 02/25/24 Dorsal (posterior);Right Hand <1 day                          Imaging: Reviewed radiology reports from this admission including: chest CT scan    Recent Cultures (last 7 days):   Results from last 7 days   Lab Units 02/24/24  1856 02/24/24 1854   BLOOD CULTURE  Received in Microbiology Lab. Culture in Progress. Received in Microbiology Lab. Culture in Progress.       Last 24 Hours Medication List:   Current Facility-Administered Medications   Medication Dose Route Frequency Provider Last Rate    acetaminophen  650 mg Oral Q6H PRN Franklin Caldwell MD      aspirin  81 mg Oral Daily Franklin Caldwell MD      atenolol  50 mg Oral HS Franklin Caldwell MD      vitamin B-12  1,000 mcg Oral Daily Franklin Caldwell MD      Diclofenac Sodium  2 g Topical 4x Daily Avani  MD Sinan      heparin (porcine)  5,000 Units Subcutaneous Q8H [facility] Franklin Caldwell MD      loratadine  10 mg Oral Daily Franklin Caldwell MD      nicotine  1 patch Transdermal Daily Franklin Caldwell MD      pantoprazole  40 mg Oral Early Morning Franklin Caldwell MD      sodium chloride  125 mL/hr Intravenous Continuous Franklin Caldwell  mL/hr (02/24/24 2234)    traZODone  50 mg Oral HS Avani Menon MD      venlafaxine  150 mg Oral Daily Franklin Caldwell MD          Today, Patient Was Seen By: Avani Menon MD    **Please Note: This note may have been constructed using a voice recognition system.**

## 2024-02-25 NOTE — PLAN OF CARE
Problem: Prexisting or High Potential for Compromised Skin Integrity  Goal: Skin integrity is maintained or improved  Description: INTERVENTIONS:  - Identify patients at risk for skin breakdown  - Assess and monitor skin integrity  - Assess and monitor nutrition and hydration status  - Monitor labs   - Assess for incontinence   - Turn and reposition patient  - Assist with mobility/ambulation  - Relieve pressure over bony prominences  - Avoid friction and shearing  - Provide appropriate hygiene as needed including keeping skin clean and dry  - Evaluate need for skin moisturizer/barrier cream  - Collaborate with interdisciplinary team   - Patient/family teaching  - Consider wound care consult   2/25/2024 0029 by Beatrice Hsieh RN  Outcome: Progressing  2/25/2024 0029 by Beatrice Hsieh RN  Outcome: Progressing     Problem: PAIN - ADULT  Goal: Verbalizes/displays adequate comfort level or baseline comfort level  Description: Interventions:  - Encourage patient to monitor pain and request assistance  - Assess pain using appropriate pain scale  - Administer analgesics based on type and severity of pain and evaluate response  - Implement non-pharmacological measures as appropriate and evaluate response  - Consider cultural and social influences on pain and pain management  - Notify physician/advanced practitioner if interventions unsuccessful or patient reports new pain  2/25/2024 0029 by Beatrice Hsieh RN  Outcome: Progressing  2/25/2024 0029 by Beatrice Hsieh RN  Outcome: Progressing     Problem: INFECTION - ADULT  Goal: Absence or prevention of progression during hospitalization  Description: INTERVENTIONS:  - Assess and monitor for signs and symptoms of infection  - Monitor lab/diagnostic results  - Monitor all insertion sites, i.e. indwelling lines, tubes, and drains  - Monitor endotracheal if appropriate and nasal secretions for changes in amount and color  - Halfway appropriate cooling/warming therapies per  order  - Administer medications as ordered  - Instruct and encourage patient and family to use good hand hygiene technique  - Identify and instruct in appropriate isolation precautions for identified infection/condition  2/25/2024 0029 by Beatrice Hsieh RN  Outcome: Progressing  2/25/2024 0029 by Beatrice Hsieh RN  Outcome: Progressing     Problem: SAFETY ADULT  Goal: Patient will remain free of falls  Description: INTERVENTIONS:  - Educate patient/family on patient safety including physical limitations  - Instruct patient to call for assistance with activity   - Consult OT/PT to assist with strengthening/mobility   - Keep Call bell within reach  - Keep bed low and locked with side rails adjusted as appropriate  - Keep care items and personal belongings within reach  - Initiate and maintain comfort rounds  - Make Fall Risk Sign visible to staff  - Offer Toileting every 2 Hours, in advance of need  - Initiate/Maintain bed/chair alarm  - Obtain necessary fall risk management equipment  - Apply yellow socks and bracelet for high fall risk patients  - Consider moving patient to room near nurses station  2/25/2024 0029 by Beatrice Hsieh RN  Outcome: Progressing  2/25/2024 0029 by Beatrice Hiseh RN  Outcome: Progressing  Goal: Maintain or return to baseline ADL function  Description: INTERVENTIONS:  -  Assess patient's ability to carry out ADLs; assess patient's baseline for ADL function and identify physical deficits which impact ability to perform ADLs (bathing, care of mouth/teeth, toileting, grooming, dressing, etc.)  - Assess/evaluate cause of self-care deficits   - Assess range of motion  - Assess patient's mobility; develop plan if impaired  - Assess patient's need for assistive devices and provide as appropriate  - Encourage maximum independence but intervene and supervise when necessary  - Involve family in performance of ADLs  - Assess for home care needs following discharge   - Consider OT consult to assist  with ADL evaluation and planning for discharge  - Provide patient education as appropriate  2/25/2024 0029 by Beatrice Hsieh RN  Outcome: Progressing  2/25/2024 0029 by Beatrice Hsieh RN  Outcome: Progressing  Goal: Maintains/Returns to pre admission functional level  Description: INTERVENTIONS:  - Perform AM-PAC 6 Click Basic Mobility/ Daily Activity assessment daily.  - Set and communicate daily mobility goal to care team and patient/family/caregiver.   - Collaborate with rehabilitation services on mobility goals if consulted  - Perform Range of Motion 3 times a day.  - Reposition patient every 2 hours.  - Dangle patient 3 times a day  - Stand patient 3 times a day  - Ambulate patient 3 times a day  - Out of bed to chair 3 times a day   - Out of bed for meals 3 times a day  - Out of bed for toileting  - Record patient progress and toleration of activity level   2/25/2024 0029 by Beatrice Hsieh RN  Outcome: Progressing  2/25/2024 0029 by Beatrice Hsieh RN  Outcome: Progressing     Problem: DISCHARGE PLANNING  Goal: Discharge to home or other facility with appropriate resources  Description: INTERVENTIONS:  - Identify barriers to discharge w/patient and caregiver  - Arrange for needed discharge resources and transportation as appropriate  - Identify discharge learning needs (meds, wound care, etc.)  - Arrange for interpretive services to assist at discharge as needed  - Refer to Case Management Department for coordinating discharge planning if the patient needs post-hospital services based on physician/advanced practitioner order or complex needs related to functional status, cognitive ability, or social support system  2/25/2024 0029 by Beatrice Hsieh RN  Outcome: Progressing  2/25/2024 0029 by Beatrice Hsieh RN  Outcome: Progressing     Problem: Knowledge Deficit  Goal: Patient/family/caregiver demonstrates understanding of disease process, treatment plan, medications, and discharge  instructions  Description: Complete learning assessment and assess knowledge base.  Interventions:  - Provide teaching at level of understanding  - Provide teaching via preferred learning methods  2/25/2024 0029 by Beatrice Hsieh RN  Outcome: Progressing  2/25/2024 0029 by Beatrice Hsieh RN  Outcome: Progressing     Problem: CARDIOVASCULAR - ADULT  Goal: Maintains optimal cardiac output and hemodynamic stability  Description: INTERVENTIONS:  - Monitor I/O, vital signs and rhythm  - Monitor for S/S and trends of decreased cardiac output  - Administer and titrate ordered vasoactive medications to optimize hemodynamic stability  - Assess quality of pulses, skin color and temperature  - Assess for signs of decreased coronary artery perfusion  - Instruct patient to report change in severity of symptoms  2/25/2024 0029 by Beatrice Hsieh RN  Outcome: Progressing  2/25/2024 0029 by Beatrice Hsieh RN  Outcome: Progressing  Goal: Absence of cardiac dysrhythmias or at baseline rhythm  Description: INTERVENTIONS:  - Continuous cardiac monitoring, vital signs, obtain 12 lead EKG if ordered  - Administer antiarrhythmic and heart rate control medications as ordered  - Monitor electrolytes and administer replacement therapy as ordered  2/25/2024 0029 by Beatrice Hsieh RN  Outcome: Progressing  2/25/2024 0029 by Beatrice Hsieh RN  Outcome: Progressing     Problem: RESPIRATORY - ADULT  Goal: Achieves optimal ventilation and oxygenation  Description: INTERVENTIONS:  - Assess for changes in respiratory status  - Assess for changes in mentation and behavior  - Position to facilitate oxygenation and minimize respiratory effort  - Oxygen administered by appropriate delivery if ordered  - Initiate smoking cessation education as indicated  - Encourage broncho-pulmonary hygiene including cough, deep breathe, Incentive Spirometry  - Assess the need for suctioning and aspirate as needed  - Assess and instruct to report SOB or any  respiratory difficulty  - Respiratory Therapy support as indicated  2/25/2024 0029 by Beatrice Hsieh RN  Outcome: Progressing  2/25/2024 0029 by Beatrice Hsieh RN  Outcome: Progressing     Problem: METABOLIC, FLUID AND ELECTROLYTES - ADULT  Goal: Electrolytes maintained within normal limits  Description: INTERVENTIONS:  - Monitor labs and assess patient for signs and symptoms of electrolyte imbalances  - Administer electrolyte replacement as ordered  - Monitor response to electrolyte replacements, including repeat lab results as appropriate  - Instruct patient on fluid and nutrition as appropriate  2/25/2024 0029 by Beatrice Hsieh RN  Outcome: Progressing  2/25/2024 0029 by Beatrice Hsieh RN  Outcome: Progressing  Goal: Fluid balance maintained  Description: INTERVENTIONS:  - Monitor labs   - Monitor I/O and WT  - Instruct patient on fluid and nutrition as appropriate  - Assess for signs & symptoms of volume excess or deficit  2/25/2024 0029 by Beatrice Hsieh RN  Outcome: Progressing  2/25/2024 0029 by Beatrice Hsieh RN  Outcome: Progressing  Goal: Glucose maintained within target range  Description: INTERVENTIONS:  - Monitor Blood Glucose as ordered  - Assess for signs and symptoms of hyperglycemia and hypoglycemia  - Administer ordered medications to maintain glucose within target range  - Assess nutritional intake and initiate nutrition service referral as needed  2/25/2024 0029 by Beatrice Hsieh RN  Outcome: Progressing  2/25/2024 0029 by Beatrice Hsieh RN  Outcome: Progressing     Problem: MUSCULOSKELETAL - ADULT  Goal: Maintain or return mobility to safest level of function  Description: INTERVENTIONS:  - Assess patient's ability to carry out ADLs; assess patient's baseline for ADL function and identify physical deficits which impact ability to perform ADLs (bathing, care of mouth/teeth, toileting, grooming, dressing, etc.)  - Assess/evaluate cause of self-care deficits   - Assess range of motion  -  Assess patient's mobility  - Assess patient's need for assistive devices and provide as appropriate  - Encourage maximum independence but intervene and supervise when necessary  - Involve family in performance of ADLs  - Assess for home care needs following discharge   - Consider OT consult to assist with ADL evaluation and planning for discharge  - Provide patient education as appropriate  2/25/2024 0029 by Beatrice Hsieh RN  Outcome: Progressing  2/25/2024 0029 by Beatrice Hsieh RN  Outcome: Progressing  Goal: Maintain proper alignment of affected body part  Description: INTERVENTIONS:  - Support, maintain and protect limb and body alignment  - Provide patient/ family with appropriate education  2/25/2024 0029 by Beatrice Hsieh RN  Outcome: Progressing  2/25/2024 0029 by Beatrice Hsieh RN  Outcome: Progressing

## 2024-02-26 ENCOUNTER — APPOINTMENT (INPATIENT)
Dept: CT IMAGING | Facility: HOSPITAL | Age: 64
DRG: 557 | End: 2024-02-26
Payer: MEDICARE

## 2024-02-26 VITALS
DIASTOLIC BLOOD PRESSURE: 78 MMHG | OXYGEN SATURATION: 93 % | HEART RATE: 73 BPM | TEMPERATURE: 98.1 F | HEIGHT: 65 IN | RESPIRATION RATE: 18 BRPM | BODY MASS INDEX: 39.04 KG/M2 | WEIGHT: 234.35 LBS | SYSTOLIC BLOOD PRESSURE: 155 MMHG

## 2024-02-26 PROBLEM — G92.9 TOXIC ENCEPHALOPATHY: Status: ACTIVE | Noted: 2024-02-24

## 2024-02-26 LAB
ANION GAP SERPL CALCULATED.3IONS-SCNC: 3 MMOL/L
ANION GAP SERPL CALCULATED.3IONS-SCNC: 4 MMOL/L
B BURGDOR IGG+IGM SER QL IA: NEGATIVE
BASOPHILS # BLD AUTO: 0.03 THOUSANDS/ÂΜL (ref 0–0.1)
BASOPHILS # BLD AUTO: 0.05 THOUSANDS/ÂΜL (ref 0–0.1)
BASOPHILS NFR BLD AUTO: 0 % (ref 0–1)
BASOPHILS NFR BLD AUTO: 1 % (ref 0–1)
BUN SERPL-MCNC: 12 MG/DL (ref 5–25)
BUN SERPL-MCNC: 13 MG/DL (ref 5–25)
CALCIUM SERPL-MCNC: 6.8 MG/DL (ref 8.4–10.2)
CALCIUM SERPL-MCNC: 8.7 MG/DL (ref 8.4–10.2)
CHLORIDE SERPL-SCNC: 109 MMOL/L (ref 96–108)
CHLORIDE SERPL-SCNC: 117 MMOL/L (ref 96–108)
CK SERPL-CCNC: 158 U/L (ref 26–192)
CO2 SERPL-SCNC: 23 MMOL/L (ref 21–32)
CO2 SERPL-SCNC: 28 MMOL/L (ref 21–32)
CREAT SERPL-MCNC: 0.48 MG/DL (ref 0.6–1.3)
CREAT SERPL-MCNC: 0.66 MG/DL (ref 0.6–1.3)
EOSINOPHIL # BLD AUTO: 0.25 THOUSAND/ÂΜL (ref 0–0.61)
EOSINOPHIL # BLD AUTO: 0.49 THOUSAND/ÂΜL (ref 0–0.61)
EOSINOPHIL NFR BLD AUTO: 3 % (ref 0–6)
EOSINOPHIL NFR BLD AUTO: 5 % (ref 0–6)
ERYTHROCYTE [DISTWIDTH] IN BLOOD BY AUTOMATED COUNT: 15.2 % (ref 11.6–15.1)
ERYTHROCYTE [DISTWIDTH] IN BLOOD BY AUTOMATED COUNT: 15.2 % (ref 11.6–15.1)
ERYTHROCYTE [SEDIMENTATION RATE] IN BLOOD: 18 MM/HOUR (ref 0–29)
GFR SERPL CREATININE-BSD FRML MDRD: 102 ML/MIN/1.73SQ M
GFR SERPL CREATININE-BSD FRML MDRD: 92 ML/MIN/1.73SQ M
GLUCOSE SERPL-MCNC: 78 MG/DL (ref 65–140)
GLUCOSE SERPL-MCNC: 91 MG/DL (ref 65–140)
HCT VFR BLD AUTO: 28 % (ref 34.8–46.1)
HCT VFR BLD AUTO: 36.9 % (ref 34.8–46.1)
HGB BLD-MCNC: 11.9 G/DL (ref 11.5–15.4)
HGB BLD-MCNC: 9 G/DL (ref 11.5–15.4)
IMM GRANULOCYTES # BLD AUTO: 0.03 THOUSAND/UL (ref 0–0.2)
IMM GRANULOCYTES # BLD AUTO: 0.03 THOUSAND/UL (ref 0–0.2)
IMM GRANULOCYTES NFR BLD AUTO: 0 % (ref 0–2)
IMM GRANULOCYTES NFR BLD AUTO: 0 % (ref 0–2)
LYMPHOCYTES # BLD AUTO: 2.91 THOUSANDS/ÂΜL (ref 0.6–4.47)
LYMPHOCYTES # BLD AUTO: 3.82 THOUSANDS/ÂΜL (ref 0.6–4.47)
LYMPHOCYTES NFR BLD AUTO: 34 % (ref 14–44)
LYMPHOCYTES NFR BLD AUTO: 38 % (ref 14–44)
MAGNESIUM SERPL-MCNC: 1.4 MG/DL (ref 1.9–2.7)
MCH RBC QN AUTO: 29.4 PG (ref 26.8–34.3)
MCH RBC QN AUTO: 29.6 PG (ref 26.8–34.3)
MCHC RBC AUTO-ENTMCNC: 32.1 G/DL (ref 31.4–37.4)
MCHC RBC AUTO-ENTMCNC: 32.2 G/DL (ref 31.4–37.4)
MCV RBC AUTO: 91 FL (ref 82–98)
MCV RBC AUTO: 92 FL (ref 82–98)
MONOCYTES # BLD AUTO: 0.55 THOUSAND/ÂΜL (ref 0.17–1.22)
MONOCYTES # BLD AUTO: 0.67 THOUSAND/ÂΜL (ref 0.17–1.22)
MONOCYTES NFR BLD AUTO: 6 % (ref 4–12)
MONOCYTES NFR BLD AUTO: 7 % (ref 4–12)
NEUTROPHILS # BLD AUTO: 4.78 THOUSANDS/ÂΜL (ref 1.85–7.62)
NEUTROPHILS # BLD AUTO: 5.12 THOUSANDS/ÂΜL (ref 1.85–7.62)
NEUTS SEG NFR BLD AUTO: 49 % (ref 43–75)
NEUTS SEG NFR BLD AUTO: 57 % (ref 43–75)
NRBC BLD AUTO-RTO: 0 /100 WBCS
NRBC BLD AUTO-RTO: 0 /100 WBCS
PLATELET # BLD AUTO: 146 THOUSANDS/UL (ref 149–390)
PLATELET # BLD AUTO: 195 THOUSANDS/UL (ref 149–390)
PMV BLD AUTO: 11 FL (ref 8.9–12.7)
PMV BLD AUTO: 9.9 FL (ref 8.9–12.7)
POTASSIUM SERPL-SCNC: 3.2 MMOL/L (ref 3.5–5.3)
POTASSIUM SERPL-SCNC: 3.9 MMOL/L (ref 3.5–5.3)
RBC # BLD AUTO: 3.04 MILLION/UL (ref 3.81–5.12)
RBC # BLD AUTO: 4.05 MILLION/UL (ref 3.81–5.12)
SODIUM SERPL-SCNC: 141 MMOL/L (ref 135–147)
SODIUM SERPL-SCNC: 143 MMOL/L (ref 135–147)
WBC # BLD AUTO: 10.18 THOUSAND/UL (ref 4.31–10.16)
WBC # BLD AUTO: 8.55 THOUSAND/UL (ref 4.31–10.16)

## 2024-02-26 PROCEDURE — 83735 ASSAY OF MAGNESIUM: CPT | Performed by: FAMILY MEDICINE

## 2024-02-26 PROCEDURE — 80048 BASIC METABOLIC PNL TOTAL CA: CPT | Performed by: FAMILY MEDICINE

## 2024-02-26 PROCEDURE — 70450 CT HEAD/BRAIN W/O DYE: CPT

## 2024-02-26 PROCEDURE — 82550 ASSAY OF CK (CPK): CPT | Performed by: FAMILY MEDICINE

## 2024-02-26 PROCEDURE — 85652 RBC SED RATE AUTOMATED: CPT | Performed by: FAMILY MEDICINE

## 2024-02-26 PROCEDURE — 72125 CT NECK SPINE W/O DYE: CPT

## 2024-02-26 PROCEDURE — 99239 HOSP IP/OBS DSCHRG MGMT >30: CPT | Performed by: FAMILY MEDICINE

## 2024-02-26 PROCEDURE — 97166 OT EVAL MOD COMPLEX 45 MIN: CPT

## 2024-02-26 PROCEDURE — 85025 COMPLETE CBC W/AUTO DIFF WBC: CPT | Performed by: FAMILY MEDICINE

## 2024-02-26 RX ORDER — FUROSEMIDE 40 MG/1
40 TABLET ORAL DAILY
Status: ON HOLD
Start: 2024-02-26

## 2024-02-26 RX ORDER — MAGNESIUM OXIDE 400 MG/1
400 TABLET ORAL DAILY
Qty: 30 TABLET | Refills: 0 | Status: ON HOLD | OUTPATIENT
Start: 2024-02-27

## 2024-02-26 RX ORDER — LORATADINE 10 MG/1
10 TABLET ORAL DAILY
Qty: 30 TABLET | Refills: 0 | Status: ON HOLD | OUTPATIENT
Start: 2024-02-27

## 2024-02-26 RX ORDER — MAGNESIUM SULFATE HEPTAHYDRATE 40 MG/ML
4 INJECTION, SOLUTION INTRAVENOUS ONCE
Qty: 100 ML | Refills: 0 | Status: COMPLETED | OUTPATIENT
Start: 2024-02-26 | End: 2024-02-26

## 2024-02-26 RX ORDER — TRAZODONE HYDROCHLORIDE 50 MG/1
50 TABLET ORAL
Qty: 20 TABLET | Refills: 0 | Status: ON HOLD | OUTPATIENT
Start: 2024-02-26

## 2024-02-26 RX ADMIN — SODIUM CHLORIDE 125 ML/HR: 0.9 INJECTION, SOLUTION INTRAVENOUS at 09:50

## 2024-02-26 RX ADMIN — MAGNESIUM SULFATE HEPTAHYDRATE 4 G: 40 INJECTION, SOLUTION INTRAVENOUS at 10:25

## 2024-02-26 RX ADMIN — VENLAFAXINE HYDROCHLORIDE 150 MG: 150 CAPSULE, EXTENDED RELEASE ORAL at 09:02

## 2024-02-26 RX ADMIN — ACETAMINOPHEN 650 MG: 325 TABLET, FILM COATED ORAL at 06:50

## 2024-02-26 RX ADMIN — ACETAMINOPHEN 650 MG: 325 TABLET, FILM COATED ORAL at 00:22

## 2024-02-26 RX ADMIN — PANTOPRAZOLE SODIUM 40 MG: 40 TABLET, DELAYED RELEASE ORAL at 05:03

## 2024-02-26 RX ADMIN — HEPARIN SODIUM 5000 UNITS: 5000 INJECTION INTRAVENOUS; SUBCUTANEOUS at 05:03

## 2024-02-26 RX ADMIN — CYANOCOBALAMIN TAB 500 MCG 1000 MCG: 500 TAB at 09:02

## 2024-02-26 RX ADMIN — LORATADINE 10 MG: 10 TABLET ORAL at 09:02

## 2024-02-26 RX ADMIN — ASPIRIN 81 MG CHEWABLE TABLET 81 MG: 81 TABLET CHEWABLE at 09:02

## 2024-02-26 RX ADMIN — NICOTINE 1 PATCH: 7 PATCH, EXTENDED RELEASE TRANSDERMAL at 09:04

## 2024-02-26 RX ADMIN — SODIUM CHLORIDE 125 ML/HR: 0.9 INJECTION, SOLUTION INTRAVENOUS at 00:28

## 2024-02-26 RX ADMIN — Medication 2 G: at 09:04

## 2024-02-26 NOTE — ASSESSMENT & PLAN NOTE
Had procedure done at [facility] she states and it was a long time ago.  Do not have any records at this time.  She states she is on Effexor for this to help with serotonin.

## 2024-02-26 NOTE — DISCHARGE SUMMARY
[facility]  Discharge- Luba Winkler 1960, 64 y.o. female MRN: [id number]  Unit/Bed#: -[facility] Encounter: [id number]  Primary Care Provider: Jabier Cabrales DO   Date and time admitted to hospital: 2/24/2024  6:34 PM    * Non-traumatic rhabdomyolysis  Assessment & Plan  Her myalgias started 2 weeks ago, which would be around the time that she was using a snowblower when the region got heavy stone.  CK is 640, will do IV fluids  Resolved ck normal dc fluids      Hypokalemia  Assessment & Plan  Patient with a potassium of 3 which was replaced with Klor-Con in the ER  Resolved with potassium  Decrease lasix to 40 mg po daily     Hyponatremia  Assessment & Plan  Resolved with fluids- decrease outpatient lasix to 40 mg po daily     Stress at home  Assessment & Plan  Patient has a  lot of stress at home, she takes care of her  who has PLS (primary lateral sclerosis)    Toxic encephalopathy  Assessment & Plan  With somnolence and lethargy, and hallucinations/Toxic metabolic encephalopathy POA due to Benadryl, Xanax and sleep deprivation, a/e/b somnolence, lethargy and hallucinations, treating with discontinuing Xanax and start trial of Trazadone.   Patient denied prior to coming and took 2 doses of Xanax and she said for 5 hours prior to that she has not slept in days she has been facing stress also received Ativan at [facility].  This is all suspected due to insomnia lack of sleep currently it has improved she has no hallucinations getting better discussed with her the most important thing is to have adequate sleep at night we discussed placing her on trazodone 50 mg at night instead of Xanax which is half to monitor for any serotonin syndrome with Effexor  Resolved patient actually slept well there is no hallucinations she will be discharged home trazodone may be used as as needed insomnia Xanax can be stopped I also advised her to try melatonin 5 mg over-the-counter prior to  using trazodone    Hallucinations  Assessment & Plan  Apparently she has not slept for the past 10 days she uses Xanax as needed and it has not helped her about the night previously she took 2 of them and she slept for 5 hours.  Discussed with her trial of trazodone 50 mg at night to see if it helps her insomnia she has a lot of stress currently she does not have any hallucinations but discussed with her that lack of sleep could promote to have hallucinations.  No further hallucinations  Ct brain negative for acute abnormality     Myalgia  Assessment & Plan  Secondary to rhabdo.  Complains mostly of shoulder and neck area.  No hip pain.  She does have elevated CRP, but I do not think she has PMR.  Esr nml  Ct brain and cspine without acute abnormality-   Improved just has in neck area stiffness but is able to turn continue voltaren gel     Current smoker  Assessment & Plan  Nicotine patch    History of COPD  Assessment & Plan  Not in exacerbation but she does require oxygen she has significant atelectasis for which I will prescribe incentive spirometry  Current in room >92 percent     Other hyperlipidemia  Assessment & Plan  May restart statin     History of Chiari malformation  Assessment & Plan  Had procedure done at [facility] she states and it was a long time ago.  Do not have any records at this time.  She states she is on Effexor for this to help with serotonin.    Gastroesophageal reflux disease without esophagitis  Assessment & Plan  Continue PPI    Essential hypertension  Assessment & Plan  Continue atenolol and restart lisinopril           Medical Problems       Resolved Problems  Date Reviewed: 2/26/2024   None       Discharging Physician / Practitioner: Avani Menon MD  PCP: Jabier Cabrales DO  Admission Date:   Admission Orders (From admission, onward)       Ordered        02/25/24 1323  Inpatient Admission  Once            02/24/24 2111  Place in Observation  Once                           Discharge Date: 02/26/24    Consultations During Hospital Stay:  none    Procedures Performed:   none    Significant Findings / Test Results:   Ct chest -Extensive bibasilar dependent atelectasis and areas of linear parenchymal scarring/fibrosis. No lobar airspace consolidation to suggest pneumonia. No suspicious pulmonary parenchymal mass. No mediastinal or hilar lymphadenopathy by size criteria.   CT head no acute abnormality  CT C-spine no acute abnormality    Incidental Findings:   none    Test Results Pending at Discharge (will require follow up):   none     Outpatient Tests Requested:  none    Complications:  none    Reason for Admission: Toxic encephalopathy    Hospital Course:   Luba Winkler is a 64 y.o. female patient who originally presented to the hospital on 2/24/2024 due to toxic encephalopathy with hallucinations secondary to lack of sleep Xanax use and Benadryl patient has had a lot of stress at home with her  and she has not slept in about 10 days.  She also shoveled snow a week ago when she was complaining of myalgias found to have an acute rhabdo with hydration her myalgias have improved CT C-spine was done without any acute abnormality and she does has spasms in the trapezius for which she could utilize topical medications she is able to move her neck she did well with PT OT a lot of electrolyte abnormalities which were supplemented will reduce her Lasix as an outpatient to once daily and start supplementation of magnesium oxide 400 mg daily I did discuss with her discontinue Benadryl and Xanax may use trazodone as needed for insomnia it actually worked for her in the hospital also trial of outpatient over-the-counter melatonin as well prior.  I discussed with her she needs to reduce her stress for the atelectasis above she needs to use incentive spirometer otherwise she is cleared to be discharged home        Please see above list of diagnoses and related plan for additional  "information.     Condition at Discharge: stable    Discharge Day Visit / Exam:   Subjective:  seen and examined doing better slept . Neck spasm still there but improved from beginning  Vitals: Blood Pressure: 105/53 (02/26/24 0704)  Pulse: 66 (02/26/24 0704)  Temperature: 97.9 °F (36.6 °C) (02/26/24 0704)  Temp Source: Temporal (02/26/24 0704)  Respirations: 16 (02/26/24 0704)  Height: 5' 5\" (165.1 cm) (02/24/24 2157)  Weight - Scale: 106 kg (234 lb 5.6 oz) (02/24/24 2157)  SpO2: 96 % (02/26/24 0704)  Exam:   Physical Exam  Vitals and nursing note reviewed.   Constitutional:       General: She is not in acute distress.     Appearance: She is well-developed.   HENT:      Head: Normocephalic and atraumatic.   Eyes:      Conjunctiva/sclera: Conjunctivae normal.   Cardiovascular:      Rate and Rhythm: Normal rate and regular rhythm.      Heart sounds: No murmur heard.  Pulmonary:      Effort: Pulmonary effort is normal. No respiratory distress.      Breath sounds: Normal breath sounds. No wheezing or rales.   Abdominal:      Palpations: Abdomen is soft.      Tenderness: There is no abdominal tenderness.   Musculoskeletal:         General: No swelling.      Cervical back: Neck supple.   Skin:     General: Skin is warm and dry.      Capillary Refill: Capillary refill takes less than 2 seconds.   Neurological:      Mental Status: She is alert and oriented to person, place, and time.   Psychiatric:         Mood and Affect: Mood normal.          Discussion with Family: Patient declined call to .     Discharge instructions/Information to patient and family:   See after visit summary for information provided to patient and family.      Provisions for Follow-Up Care:  See after visit summary for information related to follow-up care and any pertinent home health orders.      Mobility at time of Discharge:   Basic Mobility Inpatient Raw Score: 20  JH-HLM Goal: 6: Walk 10 steps or more  JH-HLM Achieved: 6: Walk 10 " steps or more  HLM Goal achieved. Continue to encourage appropriate mobility.     Disposition:   Home with VNA Services (Reminder: Complete face to face encounter)    Planned Readmission: no     Discharge Statement:  I spent >35 minutes discharging the patient. This time was spent on the day of discharge. I had direct contact with the patient on the day of discharge. Greater than 50% of the total time was spent examining patient, answering all patient questions, arranging and discussing plan of care with patient as well as directly providing post-discharge instructions.  Additional time then spent on discharge activities.    Discharge Medications:  See after visit summary for reconciled discharge medications provided to patient and/or family.      **Please Note: This note may have been constructed using a voice recognition system**

## 2024-02-26 NOTE — PLAN OF CARE
Problem: Prexisting or High Potential for Compromised Skin Integrity  Goal: Skin integrity is maintained or improved  Description: INTERVENTIONS:  - Identify patients at risk for skin breakdown  - Assess and monitor skin integrity  - Assess and monitor nutrition and hydration status  - Monitor labs   - Assess for incontinence   - Turn and reposition patient  - Assist with mobility/ambulation  - Relieve pressure over bony prominences  - Avoid friction and shearing  - Provide appropriate hygiene as needed including keeping skin clean and dry  - Evaluate need for skin moisturizer/barrier cream  - Collaborate with interdisciplinary team   - Patient/family teaching  - Consider wound care consult   Outcome: Progressing     Problem: PAIN - ADULT  Goal: Verbalizes/displays adequate comfort level or baseline comfort level  Description: Interventions:  - Encourage patient to monitor pain and request assistance  - Assess pain using appropriate pain scale  - Administer analgesics based on type and severity of pain and evaluate response  - Implement non-pharmacological measures as appropriate and evaluate response  - Consider cultural and social influences on pain and pain management  - Notify physician/advanced practitioner if interventions unsuccessful or patient reports new pain  Outcome: Progressing     Problem: INFECTION - ADULT  Goal: Absence or prevention of progression during hospitalization  Description: INTERVENTIONS:  - Assess and monitor for signs and symptoms of infection  - Monitor lab/diagnostic results  - Monitor all insertion sites, i.e. indwelling lines, tubes, and drains  - Monitor endotracheal if appropriate and nasal secretions for changes in amount and color  - [place] appropriate cooling/warming therapies per order  - Administer medications as ordered  - Instruct and encourage patient and family to use good hand hygiene technique  - Identify and instruct in appropriate isolation precautions for  identified infection/condition  Outcome: Progressing

## 2024-02-26 NOTE — OCCUPATIONAL THERAPY NOTE
"    Occupational Therapy Evaluation     Patient Name: Luba Winkler  Today's Date: 2/26/2024  Problem List  Principal Problem:    Non-traumatic rhabdomyolysis  Active Problems:    Essential hypertension    Gastroesophageal reflux disease without esophagitis    History of Chiari malformation    Other hyperlipidemia    History of COPD    Current smoker    Myalgia    Hallucinations    Toxic encephalopathy    Stress at home    Hyponatremia    Hypokalemia    Past Medical History  Past Medical History:   Diagnosis Date    COPD (chronic obstructive pulmonary disease) (HCC)     Diverticulitis     GERD (gastroesophageal reflux disease)     Hypertension     Psychiatric disorder      Past Surgical History  Past Surgical History:   Procedure Laterality Date    ABDOMINAL SURGERY      CERVICAL SPINE SURGERY      rods placed in c4,c5,c6           02/26/24 0954   OT Last Visit   OT Visit Date 02/26/24   Note Type   Note type Evaluation   Pain Assessment   Pain Assessment Tool 0-10   Pain Score No Pain   Restrictions/Precautions   Weight Bearing Precautions Per Order No   Other Precautions Fall Risk   Home Living   Type of Home House  (Bilevel)   Home Layout Two level  (Stair glide but pt does not always utilize; 6 + 6 steps L railing)   Bathroom Shower/Tub Walk-in shower   Bathroom Toilet Raised   Bathroom Equipment Grab bars in shower;Shower chair   Bathroom Accessibility Accessible   Home Equipment Walker;Cane   Prior Function   Level of [place] Independent with ADLs;Independent with functional mobility;Independent with IADLS   Lives With Spouse   Receives Help From Neighbor   IADLs Independent with driving;Independent with meal prep;Independent with medication management   Falls in the last 6 months 0   Vocational Retired   Lifestyle   Autonomy IND for ADLs, IADLs, and functional mobility without AD   Reciprocal Relationships Caregiver to spouse who has ALS   Subjective   Subjective \"I keep hitting my head on the freezer " "because I forget that I opened it when I bend down to get food from the fridge.\"   ADL   Where Assessed Standing at sink   Grooming Assistance 6  Modified Independent   UB Bathing Assistance 5  Supervision/Setup   LB Bathing Assistance 5  Supervision/Setup   LB Dressing Assistance 5  Supervision/Setup   LB Dressing Deficit Don/doff R sock;Don/doff L sock  (seated on chair)   Toileting Assistance  5  Supervision/Setup   Transfers   Sit to Stand 5  Supervision   Stand to Sit 5  Supervision   Stand pivot 5  Supervision   Additional Comments No AD; pt received standing in room returning from appointment with PCT present   Functional Mobility   Functional Mobility 5  Supervision   Additional Comments household mobility distance in room without AD   Balance   Static Sitting Good   Dynamic Sitting Good   Static Standing Fair +   Dynamic Standing Fair +   Activity Tolerance   Activity Tolerance Patient tolerated treatment well   RUE Assessment   RUE Assessment WFL  (4/5)   LUE Assessment   LUE Assessment WFL  (4/5)   Assessment   Limitation Decreased ADL status;Decreased UE strength;Decreased endurance;Decreased self-care trans;Decreased high-level ADLs   Assessment Ms. Winkler is a 64 y.o. female admitted to [facility] 2/24/2024 with admitting diagnosis: non-traumatic rhabdomyolysis. Pt with PMHx impacting their performance during ADL tasks, including: stress at home, toxic encephalopathy, hallucinations, hx of Chiari malformation, COPD. Prior to admission to the hospital pt was performing ADLs without physical assistance. IADLs without physical assistance. Functional transfers/ambulation without physical assistance. Cognitive status was PTA was WFL. OT order placed to assess pt's ADLs, cognitive status, and performance during functional tasks in order to maximize safety and independence while making most appropriate d/c recommendations. Pt's clinical presentation is currently evolving given new onset deficits that effect pt's " occupational performance and ability to safely return to PLOF including decrease activity tolerance, decrease standing balance, and decrease performance during ADL tasks combined with medical complications of hypertension .  Personal factors affecting pt at time of initial evaluation include: limited home support and tobacco use. Pt will benefit from continued acute skilled OT services to address deficits as defined above and to maximize level independence/participation during ADLs and functional tasks to facilitate return toward PLOF and improved quality of life.     From an occupational therapy standpoint, recommendation at time of d/c would be Level III: Minimum Resource Intensity. The patient's raw score on the AM-PAC Daily Activity Inpatient Short Form is 21. A raw score of greater than or equal to 19 suggests the patient may benefit from discharge to home. Please refer to the recommendation of the Occupational Therapist for safe discharge planning.   Plan   OT Frequency Eval only   Discharge Recommendation   Rehab Resource Intensity Level, OT III (Minimum Resource Intensity)   Equipment Recommended Shower/Tub chair with back ($)   AM-PAC Daily Activity Inpatient   Lower Body Dressing 3   Bathing 3   Toileting 3   Upper Body Dressing 4   Grooming 4   Eating 4   Daily Activity Raw Score 21   Daily Activity Standardized Score (Calc for Raw Score >=11) 44.27   AM-PAC Applied Cognition Inpatient   Following a Speech/Presentation 4   Understanding Ordinary Conversation 4   Taking Medications 4   Remembering Where Things Are Placed or Put Away 3   Remembering List of 4-5 Errands 3   Taking Care of Complicated Tasks 3   Applied Cognition Raw Score 21   Applied Cognition Standardized Score 44.3     Assessment:  Ms. Winkler is a 64 y.o. female admitted to [facility] 2/24/2024 with admitting diagnosis: non-traumatic rhabdomyolysis. Pt with PMHx impacting their performance during ADL tasks, including: stress at home, toxic  encephalopathy, hallucinations, hx of Chiari malformation, COPD. From an occupational therapy standpoint, recommendation at time of d/c would be Level III: Minimum Resource Intensity. The patient's raw score on the AM-PAC Daily Activity Inpatient Short Form is 21. A raw score of greater than or equal to 19 suggests the patient may benefit from discharge to home. Please refer to the recommendation of the Occupational Therapist for safe discharge planning.    Nahomy Claros MS, OTR/L

## 2024-02-26 NOTE — ASSESSMENT & PLAN NOTE
Her myalgias started 2 weeks ago, which would be around the time that she was using a snowblower when the region got heavy stone.  CK is 640, will do IV fluids  Resolved ck normal dc fluids

## 2024-02-26 NOTE — ASSESSMENT & PLAN NOTE
Apparently she has not slept for the past 10 days she uses Xanax as needed and it has not helped her about the night previously she took 2 of them and she slept for 5 hours.  Discussed with her trial of trazodone 50 mg at night to see if it helps her insomnia she has a lot of stress currently she does not have any hallucinations but discussed with her that lack of sleep could promote to have hallucinations.  No further hallucinations  Ct brain negative for acute abnormality

## 2024-02-26 NOTE — PLAN OF CARE
Problem: Prexisting or High Potential for Compromised Skin Integrity  Goal: Skin integrity is maintained or improved  Description: INTERVENTIONS:  - Identify patients at risk for skin breakdown  - Assess and monitor skin integrity  - Assess and monitor nutrition and hydration status  - Monitor labs   - Assess for incontinence   - Turn and reposition patient  - Assist with mobility/ambulation  - Relieve pressure over bony prominences  - Avoid friction and shearing  - Provide appropriate hygiene as needed including keeping skin clean and dry  - Evaluate need for skin moisturizer/barrier cream  - Collaborate with interdisciplinary team   - Patient/family teaching  - Consider wound care consult   Outcome: Progressing     Problem: PAIN - ADULT  Goal: Verbalizes/displays adequate comfort level or baseline comfort level  Description: Interventions:  - Encourage patient to monitor pain and request assistance  - Assess pain using appropriate pain scale  - Administer analgesics based on type and severity of pain and evaluate response  - Implement non-pharmacological measures as appropriate and evaluate response  - Consider cultural and social influences on pain and pain management  - Notify physician/advanced practitioner if interventions unsuccessful or patient reports new pain  Outcome: Progressing     Problem: INFECTION - ADULT  Goal: Absence or prevention of progression during hospitalization  Description: INTERVENTIONS:  - Assess and monitor for signs and symptoms of infection  - Monitor lab/diagnostic results  - Monitor all insertion sites, i.e. indwelling lines, tubes, and drains  - Monitor endotracheal if appropriate and nasal secretions for changes in amount and color  - [place] appropriate cooling/warming therapies per order  - Administer medications as ordered  - Instruct and encourage patient and family to use good hand hygiene technique  - Identify and instruct in appropriate isolation precautions for  identified infection/condition  Outcome: Progressing     Problem: SAFETY ADULT  Goal: Patient will remain free of falls  Description: INTERVENTIONS:  - Educate patient/family on patient safety including physical limitations  - Instruct patient to call for assistance with activity   - Consult OT/PT to assist with strengthening/mobility   - Keep Call bell within reach  - Keep bed low and locked with side rails adjusted as appropriate  - Keep care items and personal belongings within reach  - Initiate and maintain comfort rounds  - Make Fall Risk Sign visible to staff  - Apply yellow socks and bracelet for high fall risk patients  - Consider moving patient to room near nurses station  Outcome: Progressing  Goal: Maintain or return to baseline ADL function  Description: INTERVENTIONS:  -  Assess patient's ability to carry out ADLs; assess patient's baseline for ADL function and identify physical deficits which impact ability to perform ADLs (bathing, care of mouth/teeth, toileting, grooming, dressing, etc.)  - Assess/evaluate cause of self-care deficits   - Assess range of motion  - Assess patient's mobility; develop plan if impaired  - Assess patient's need for assistive devices and provide as appropriate  - Encourage maximum independence but intervene and supervise when necessary  - Involve family in performance of ADLs  - Assess for home care needs following discharge   - Consider OT consult to assist with ADL evaluation and planning for discharge  - Provide patient education as appropriate  Outcome: Progressing  Goal: Maintains/Returns to pre admission functional level  Description: INTERVENTIONS:  - Perform AM-PAC 6 Click Basic Mobility/ Daily Activity assessment daily.  - Set and communicate daily mobility goal to care team and patient/family/caregiver.   - Collaborate with rehabilitation services on mobility goals if consulted  - Perform Range of Motion 3 times a day.  - Reposition patient every 2 hours.  -  Dangle patient 3 times a day  - Stand patient 3 times a day  - Ambulate patient 3 times a day  - Out of bed to chair 3 times a day   - Out of bed for meals 3 times a day  - Out of bed for toileting  - Record patient progress and toleration of activity level   Outcome: Progressing     Problem: DISCHARGE PLANNING  Goal: Discharge to home or other facility with appropriate resources  Description: INTERVENTIONS:  - Identify barriers to discharge w/patient and caregiver  - Arrange for needed discharge resources and transportation as appropriate  - Identify discharge learning needs (meds, wound care, etc.)  - Arrange for interpretive services to assist at discharge as needed  - Refer to Case Management Department for coordinating discharge planning if the patient needs post-hospital services based on physician/advanced practitioner order or complex needs related to functional status, cognitive ability, or social support system  Outcome: Progressing     Problem: Knowledge Deficit  Goal: Patient/family/caregiver demonstrates understanding of disease process, treatment plan, medications, and discharge instructions  Description: Complete learning assessment and assess knowledge base.  Interventions:  - Provide teaching at level of understanding  - Provide teaching via preferred learning methods  Outcome: Progressing     Problem: CARDIOVASCULAR - ADULT  Goal: Maintains optimal cardiac output and hemodynamic stability  Description: INTERVENTIONS:  - Monitor I/O, vital signs and rhythm  - Monitor for S/S and trends of decreased cardiac output  - Administer and titrate ordered vasoactive medications to optimize hemodynamic stability  - Assess quality of pulses, skin color and temperature  - Assess for signs of decreased coronary artery perfusion  - Instruct patient to report change in severity of symptoms  Outcome: Progressing  Goal: Absence of cardiac dysrhythmias or at baseline rhythm  Description: INTERVENTIONS:  - Continuous  cardiac monitoring, vital signs, obtain 12 lead EKG if ordered  - Administer antiarrhythmic and heart rate control medications as ordered  - Monitor electrolytes and administer replacement therapy as ordered  Outcome: Progressing     Problem: RESPIRATORY - ADULT  Goal: Achieves optimal ventilation and oxygenation  Description: INTERVENTIONS:  - Assess for changes in respiratory status  - Assess for changes in mentation and behavior  - Position to facilitate oxygenation and minimize respiratory effort  - Oxygen administered by appropriate delivery if ordered  - Initiate smoking cessation education as indicated  - Encourage broncho-pulmonary hygiene including cough, deep breathe, Incentive Spirometry  - Assess the need for suctioning and aspirate as needed  - Assess and instruct to report SOB or any respiratory difficulty  - Respiratory Therapy support as indicated  Outcome: Progressing     Problem: METABOLIC, FLUID AND ELECTROLYTES - ADULT  Goal: Electrolytes maintained within normal limits  Description: INTERVENTIONS:  - Monitor labs and assess patient for signs and symptoms of electrolyte imbalances  - Administer electrolyte replacement as ordered  - Monitor response to electrolyte replacements, including repeat lab results as appropriate  - Instruct patient on fluid and nutrition as appropriate  Outcome: Progressing  Goal: Fluid balance maintained  Description: INTERVENTIONS:  - Monitor labs   - Monitor I/O and WT  - Instruct patient on fluid and nutrition as appropriate  - Assess for signs & symptoms of volume excess or deficit  Outcome: Progressing  Goal: Glucose maintained within target range  Description: INTERVENTIONS:  - Monitor Blood Glucose as ordered  - Assess for signs and symptoms of hyperglycemia and hypoglycemia  - Administer ordered medications to maintain glucose within target range  - Assess nutritional intake and initiate nutrition service referral as needed  Outcome: Progressing     Problem:  MUSCULOSKELETAL - ADULT  Goal: Maintain or return mobility to safest level of function  Description: INTERVENTIONS:  - Assess patient's ability to carry out ADLs; assess patient's baseline for ADL function and identify physical deficits which impact ability to perform ADLs (bathing, care of mouth/teeth, toileting, grooming, dressing, etc.)  - Assess/evaluate cause of self-care deficits   - Assess range of motion  - Assess patient's mobility  - Assess patient's need for assistive devices and provide as appropriate  - Encourage maximum independence but intervene and supervise when necessary  - Involve family in performance of ADLs  - Assess for home care needs following discharge   - Consider OT consult to assist with ADL evaluation and planning for discharge  - Provide patient education as appropriate  Outcome: Progressing  Goal: Maintain proper alignment of affected body part  Description: INTERVENTIONS:  - Support, maintain and protect limb and body alignment  - Provide patient/ family with appropriate education  Outcome: Progressing

## 2024-02-26 NOTE — ASSESSMENT & PLAN NOTE
With somnolence and lethargy, and hallucinations/Toxic metabolic encephalopathy POA due to Benadryl, Xanax and sleep deprivation, a/e/b somnolence, lethargy and hallucinations, treating with discontinuing Xanax and start trial of Trazadone.   Patient denied prior to coming and took 2 doses of Xanax and she said for 5 hours prior to that she has not slept in days she has been facing stress also received Ativan at [facility].  This is all suspected due to insomnia lack of sleep currently it has improved she has no hallucinations getting better discussed with her the most important thing is to have adequate sleep at night we discussed placing her on trazodone 50 mg at night instead of Xanax which is half to monitor for any serotonin syndrome with Effexor  Resolved patient actually slept well there is no hallucinations she will be discharged home trazodone may be used as as needed insomnia Xanax can be stopped I also advised her to try melatonin 5 mg over-the-counter prior to using trazodone

## 2024-02-26 NOTE — NURSING NOTE
Patient's IV removed by Marium Mcclure RN, catheter intact. AVS reviewed with patient including medications and smoking cessation. Concerns addressed and patient verbalized understanding. Patient escorted out by RN.

## 2024-02-26 NOTE — ASSESSMENT & PLAN NOTE
Patient with a potassium of 3 which was replaced with Klor-Con in the ER  Resolved with potassium  Decrease lasix to 40 mg po daily

## 2024-02-26 NOTE — ASSESSMENT & PLAN NOTE
Not in exacerbation but she does require oxygen she has significant atelectasis for which I will prescribe incentive spirometry  Current in room >92 percent

## 2024-02-26 NOTE — ASSESSMENT & PLAN NOTE
Secondary to rhabdo.  Complains mostly of shoulder and neck area.  No hip pain.  She does have elevated CRP, but I do not think she has PMR.  Esr nml  Ct brain and cspine without acute abnormality-   Improved just has in neck area stiffness but is able to turn continue voltaren gel

## 2024-02-26 NOTE — CASE MANAGEMENT
Case Management Discharge Planning Note    Patient name Luba Winkler  Location /-01 MRN [id number]  : 1960 Date 2024       Current Admission Date: 2024  Current Admission Diagnosis:Non-traumatic rhabdomyolysis   Patient Active Problem List    Diagnosis Date Noted    Non-traumatic rhabdomyolysis 2024    Myalgia 2024    Hallucinations 2024    Toxic encephalopathy 2024    Stress at home 2024    Hyponatremia 2024    Hypokalemia 2024    COVID-19 virus infection 2020    Hypophosphatemia 2020    Hypomagnesemia 2020    Essential hypertension 2020    Gastroesophageal reflux disease without esophagitis 2020    History of Chiari malformation 2020    Class 3 severe obesity due to excess calories in adult (HCC) 2020    Other hyperlipidemia 2020    History of COPD 2020    Current smoker 2020    Anxiety 2020      LOS (days): 1  Geometric Mean LOS (GMLOS) (days): 3.1  Days to GMLOS:2.1     OBJECTIVE:  Risk of Unplanned Readmission Score: 11.33         Current admission status: Inpatient   Preferred Pharmacy:   [facility]/pharmacy #[id number] [place] [address]  Phone: [phone] Fax: [phone]    Primary Care Provider: Jabier Cabrales DO    Primary Insurance: MEDICARE  Secondary Insurance: [facility]    DISCHARGE DETAILS:        HHC has been recommended for pt at time of discharge, pt is declining HHC, pt feels she does not need services in the home.

## 2024-02-29 LAB
BACTERIA BLD CULT: NORMAL
BACTERIA BLD CULT: NORMAL

## 2024-03-03 ENCOUNTER — APPOINTMENT (EMERGENCY)
Dept: CT IMAGING | Facility: HOSPITAL | Age: 64
DRG: 557 | End: 2024-03-03
Payer: MEDICARE

## 2024-03-03 ENCOUNTER — HOSPITAL ENCOUNTER (INPATIENT)
Facility: HOSPITAL | Age: 64
LOS: 3 days | Discharge: HOME | DRG: 557 | End: 2024-03-06
Attending: EMERGENCY MEDICINE | Admitting: FAMILY MEDICINE
Payer: MEDICARE

## 2024-03-03 DIAGNOSIS — M62.82 RHABDOMYOLYSIS: ICD-10-CM

## 2024-03-03 DIAGNOSIS — F43.9 STRESS AT HOME: ICD-10-CM

## 2024-03-03 DIAGNOSIS — G92.9 TOXIC ENCEPHALOPATHY: ICD-10-CM

## 2024-03-03 DIAGNOSIS — N17.9 AKI (ACUTE KIDNEY INJURY) (HCC): Primary | ICD-10-CM

## 2024-03-03 DIAGNOSIS — F41.9 ANXIETY: ICD-10-CM

## 2024-03-03 DIAGNOSIS — R44.3 HALLUCINATIONS: ICD-10-CM

## 2024-03-03 LAB
ALBUMIN SERPL BCP-MCNC: 4 G/DL (ref 3.5–5)
ALP SERPL-CCNC: 76 U/L (ref 34–104)
ALT SERPL W P-5'-P-CCNC: 28 U/L (ref 7–52)
ANION GAP SERPL CALCULATED.3IONS-SCNC: 12 MMOL/L
APTT PPP: 52 SECONDS (ref 23–37)
AST SERPL W P-5'-P-CCNC: 44 U/L (ref 13–39)
BACTERIA UR QL AUTO: NORMAL /HPF
BASOPHILS # BLD AUTO: 0.03 THOUSANDS/ÂΜL (ref 0–0.1)
BASOPHILS NFR BLD AUTO: 0 % (ref 0–1)
BILIRUB SERPL-MCNC: 1.06 MG/DL (ref 0.2–1)
BILIRUB UR QL STRIP: NEGATIVE
BUN SERPL-MCNC: 35 MG/DL (ref 5–25)
CALCIUM SERPL-MCNC: 10 MG/DL (ref 8.4–10.2)
CARDIAC TROPONIN I PNL SERPL HS: 6 NG/L
CHLORIDE SERPL-SCNC: 95 MMOL/L (ref 96–108)
CK SERPL-CCNC: 665 U/L (ref 26–192)
CLARITY UR: CLEAR
CO2 SERPL-SCNC: 27 MMOL/L (ref 21–32)
COLOR UR: YELLOW
CREAT SERPL-MCNC: 2.07 MG/DL (ref 0.6–1.3)
EOSINOPHIL # BLD AUTO: 0.25 THOUSAND/ÂΜL (ref 0–0.61)
EOSINOPHIL NFR BLD AUTO: 3 % (ref 0–6)
ERYTHROCYTE [DISTWIDTH] IN BLOOD BY AUTOMATED COUNT: 14.5 % (ref 11.6–15.1)
GFR SERPL CREATININE-BSD FRML MDRD: 24 ML/MIN/1.73SQ M
GLUCOSE SERPL-MCNC: 107 MG/DL (ref 65–140)
GLUCOSE UR STRIP-MCNC: NEGATIVE MG/DL
HCT VFR BLD AUTO: 37.8 % (ref 34.8–46.1)
HGB BLD-MCNC: 12.3 G/DL (ref 11.5–15.4)
HGB UR QL STRIP.AUTO: NEGATIVE
IMM GRANULOCYTES # BLD AUTO: 0.03 THOUSAND/UL (ref 0–0.2)
IMM GRANULOCYTES NFR BLD AUTO: 0 % (ref 0–2)
INR PPP: 1.25 (ref 0.84–1.19)
KETONES UR STRIP-MCNC: NEGATIVE MG/DL
LACTATE SERPL-SCNC: 1.3 MMOL/L (ref 0.5–2)
LEUKOCYTE ESTERASE UR QL STRIP: NEGATIVE
LIPASE SERPL-CCNC: 34 U/L (ref 11–82)
LYMPHOCYTES # BLD AUTO: 1.92 THOUSANDS/ÂΜL (ref 0.6–4.47)
LYMPHOCYTES NFR BLD AUTO: 22 % (ref 14–44)
MAGNESIUM SERPL-MCNC: 1.8 MG/DL (ref 1.9–2.7)
MCH RBC QN AUTO: 29.1 PG (ref 26.8–34.3)
MCHC RBC AUTO-ENTMCNC: 32.5 G/DL (ref 31.4–37.4)
MCV RBC AUTO: 90 FL (ref 82–98)
MONOCYTES # BLD AUTO: 0.67 THOUSAND/ÂΜL (ref 0.17–1.22)
MONOCYTES NFR BLD AUTO: 8 % (ref 4–12)
NEUTROPHILS # BLD AUTO: 5.77 THOUSANDS/ÂΜL (ref 1.85–7.62)
NEUTS SEG NFR BLD AUTO: 67 % (ref 43–75)
NITRITE UR QL STRIP: NEGATIVE
NON-SQ EPI CELLS URNS QL MICRO: NORMAL /HPF
NRBC BLD AUTO-RTO: 0 /100 WBCS
PH UR STRIP.AUTO: 6 [PH]
PLATELET # BLD AUTO: 345 THOUSANDS/UL (ref 149–390)
PMV BLD AUTO: 9.2 FL (ref 8.9–12.7)
POTASSIUM SERPL-SCNC: 3.3 MMOL/L (ref 3.5–5.3)
PROT SERPL-MCNC: 7.4 G/DL (ref 6.4–8.4)
PROT UR STRIP-MCNC: NEGATIVE MG/DL
PROTHROMBIN TIME: 16 SECONDS (ref 11.6–14.5)
RBC # BLD AUTO: 4.22 MILLION/UL (ref 3.81–5.12)
RBC #/AREA URNS AUTO: NORMAL /HPF
SODIUM SERPL-SCNC: 134 MMOL/L (ref 135–147)
SP GR UR STRIP.AUTO: 1.01 (ref 1–1.03)
UROBILINOGEN UR QL STRIP.AUTO: 0.2 E.U./DL
WBC # BLD AUTO: 8.67 THOUSAND/UL (ref 4.31–10.16)
WBC #/AREA URNS AUTO: NORMAL /HPF

## 2024-03-03 PROCEDURE — 84484 ASSAY OF TROPONIN QUANT: CPT | Performed by: EMERGENCY MEDICINE

## 2024-03-03 PROCEDURE — 70450 CT HEAD/BRAIN W/O DYE: CPT

## 2024-03-03 PROCEDURE — 96361 HYDRATE IV INFUSION ADD-ON: CPT

## 2024-03-03 PROCEDURE — 85730 THROMBOPLASTIN TIME PARTIAL: CPT | Performed by: EMERGENCY MEDICINE

## 2024-03-03 PROCEDURE — 85025 COMPLETE CBC W/AUTO DIFF WBC: CPT | Performed by: EMERGENCY MEDICINE

## 2024-03-03 PROCEDURE — 83735 ASSAY OF MAGNESIUM: CPT | Performed by: EMERGENCY MEDICINE

## 2024-03-03 PROCEDURE — 99285 EMERGENCY DEPT VISIT HI MDM: CPT

## 2024-03-03 PROCEDURE — 99223 1ST HOSP IP/OBS HIGH 75: CPT

## 2024-03-03 PROCEDURE — 85610 PROTHROMBIN TIME: CPT | Performed by: EMERGENCY MEDICINE

## 2024-03-03 PROCEDURE — 80053 COMPREHEN METABOLIC PANEL: CPT | Performed by: EMERGENCY MEDICINE

## 2024-03-03 PROCEDURE — 93005 ELECTROCARDIOGRAM TRACING: CPT

## 2024-03-03 PROCEDURE — 36415 COLL VENOUS BLD VENIPUNCTURE: CPT | Performed by: EMERGENCY MEDICINE

## 2024-03-03 PROCEDURE — 83605 ASSAY OF LACTIC ACID: CPT | Performed by: EMERGENCY MEDICINE

## 2024-03-03 PROCEDURE — 96375 TX/PRO/DX INJ NEW DRUG ADDON: CPT

## 2024-03-03 PROCEDURE — 99285 EMERGENCY DEPT VISIT HI MDM: CPT | Performed by: EMERGENCY MEDICINE

## 2024-03-03 PROCEDURE — 81001 URINALYSIS AUTO W/SCOPE: CPT

## 2024-03-03 PROCEDURE — 82550 ASSAY OF CK (CPK): CPT | Performed by: EMERGENCY MEDICINE

## 2024-03-03 PROCEDURE — 96365 THER/PROPH/DIAG IV INF INIT: CPT

## 2024-03-03 PROCEDURE — 83690 ASSAY OF LIPASE: CPT | Performed by: EMERGENCY MEDICINE

## 2024-03-03 RX ORDER — HEPARIN SODIUM 5000 [USP'U]/ML
5000 INJECTION, SOLUTION INTRAVENOUS; SUBCUTANEOUS EVERY 8 HOURS SCHEDULED
Status: DISCONTINUED | OUTPATIENT
Start: 2024-03-03 | End: 2024-03-06 | Stop reason: HOSPADM

## 2024-03-03 RX ORDER — ACETAMINOPHEN 325 MG/1
650 TABLET ORAL EVERY 6 HOURS PRN
Status: DISCONTINUED | OUTPATIENT
Start: 2024-03-03 | End: 2024-03-06 | Stop reason: HOSPADM

## 2024-03-03 RX ORDER — LANOLIN ALCOHOL/MO/W.PET/CERES
400 CREAM (GRAM) TOPICAL DAILY
Status: DISCONTINUED | OUTPATIENT
Start: 2024-03-03 | End: 2024-03-06 | Stop reason: HOSPADM

## 2024-03-03 RX ORDER — PANTOPRAZOLE SODIUM 40 MG/1
40 TABLET, DELAYED RELEASE ORAL
Status: DISCONTINUED | OUTPATIENT
Start: 2024-03-04 | End: 2024-03-06 | Stop reason: HOSPADM

## 2024-03-03 RX ORDER — ONDANSETRON 2 MG/ML
4 INJECTION INTRAMUSCULAR; INTRAVENOUS EVERY 6 HOURS PRN
Status: DISCONTINUED | OUTPATIENT
Start: 2024-03-03 | End: 2024-03-06 | Stop reason: HOSPADM

## 2024-03-03 RX ORDER — LORATADINE 10 MG/1
10 TABLET ORAL DAILY
Status: DISCONTINUED | OUTPATIENT
Start: 2024-03-03 | End: 2024-03-06 | Stop reason: HOSPADM

## 2024-03-03 RX ORDER — POTASSIUM CHLORIDE 20 MEQ/1
40 TABLET, EXTENDED RELEASE ORAL ONCE
Status: COMPLETED | OUTPATIENT
Start: 2024-03-03 | End: 2024-03-03

## 2024-03-03 RX ORDER — DIPHENHYDRAMINE HYDROCHLORIDE 50 MG/ML
12.5 INJECTION INTRAMUSCULAR; INTRAVENOUS ONCE
Status: COMPLETED | OUTPATIENT
Start: 2024-03-03 | End: 2024-03-03

## 2024-03-03 RX ORDER — TRAZODONE HYDROCHLORIDE 50 MG/1
50 TABLET ORAL
Status: DISCONTINUED | OUTPATIENT
Start: 2024-03-03 | End: 2024-03-06 | Stop reason: HOSPADM

## 2024-03-03 RX ORDER — ERGOCALCIFEROL 1.25 MG/1
50000 CAPSULE ORAL WEEKLY
COMMUNITY

## 2024-03-03 RX ORDER — ALBUMIN (HUMAN) 12.5 G/50ML
25 SOLUTION INTRAVENOUS ONCE
Status: COMPLETED | OUTPATIENT
Start: 2024-03-03 | End: 2024-03-03

## 2024-03-03 RX ORDER — NICOTINE 21 MG/24HR
1 PATCH, TRANSDERMAL 24 HOURS TRANSDERMAL DAILY
Status: DISCONTINUED | OUTPATIENT
Start: 2024-03-03 | End: 2024-03-06 | Stop reason: HOSPADM

## 2024-03-03 RX ORDER — LORAZEPAM 2 MG/ML
1 INJECTION INTRAMUSCULAR ONCE
Status: COMPLETED | OUTPATIENT
Start: 2024-03-03 | End: 2024-03-03

## 2024-03-03 RX ORDER — NAPROXEN SODIUM 220 MG
440 TABLET ORAL EVERY 12 HOURS PRN
COMMUNITY

## 2024-03-03 RX ORDER — MAGNESIUM OXIDE 400 MG/1
400 TABLET ORAL DAILY
Status: DISCONTINUED | OUTPATIENT
Start: 2024-03-03 | End: 2024-03-03

## 2024-03-03 RX ORDER — SODIUM CHLORIDE, SODIUM GLUCONATE, SODIUM ACETATE, POTASSIUM CHLORIDE, MAGNESIUM CHLORIDE, SODIUM PHOSPHATE, DIBASIC, AND POTASSIUM PHOSPHATE .53; .5; .37; .037; .03; .012; .00082 G/100ML; G/100ML; G/100ML; G/100ML; G/100ML; G/100ML; G/100ML
100 INJECTION, SOLUTION INTRAVENOUS CONTINUOUS
Status: DISCONTINUED | OUTPATIENT
Start: 2024-03-03 | End: 2024-03-05

## 2024-03-03 RX ORDER — VENLAFAXINE HYDROCHLORIDE 150 MG/1
150 CAPSULE, EXTENDED RELEASE ORAL DAILY
Status: DISCONTINUED | OUTPATIENT
Start: 2024-03-03 | End: 2024-03-06 | Stop reason: HOSPADM

## 2024-03-03 RX ORDER — ASPIRIN 81 MG/1
81 TABLET, CHEWABLE ORAL DAILY
Status: DISCONTINUED | OUTPATIENT
Start: 2024-03-03 | End: 2024-03-06 | Stop reason: HOSPADM

## 2024-03-03 RX ORDER — MAGNESIUM SULFATE HEPTAHYDRATE 40 MG/ML
2 INJECTION, SOLUTION INTRAVENOUS ONCE
Status: COMPLETED | OUTPATIENT
Start: 2024-03-03 | End: 2024-03-03

## 2024-03-03 RX ORDER — DROPERIDOL 2.5 MG/ML
1.25 INJECTION, SOLUTION INTRAMUSCULAR; INTRAVENOUS ONCE
Status: COMPLETED | OUTPATIENT
Start: 2024-03-03 | End: 2024-03-03

## 2024-03-03 RX ORDER — ALBUMIN (HUMAN) 12.5 G/50ML
12.5 SOLUTION INTRAVENOUS ONCE
Status: COMPLETED | OUTPATIENT
Start: 2024-03-03 | End: 2024-03-03

## 2024-03-03 RX ADMIN — DROPERIDOL 1.25 MG: 2.5 INJECTION, SOLUTION INTRAMUSCULAR; INTRAVENOUS at 10:54

## 2024-03-03 RX ADMIN — ALBUMIN (HUMAN) 12.5 G: 0.25 INJECTION, SOLUTION INTRAVENOUS at 14:11

## 2024-03-03 RX ADMIN — Medication 2 G: at 22:39

## 2024-03-03 RX ADMIN — LORAZEPAM 1 MG: 2 INJECTION INTRAMUSCULAR; INTRAVENOUS at 10:54

## 2024-03-03 RX ADMIN — MAGNESIUM SULFATE HEPTAHYDRATE 2 G: 40 INJECTION, SOLUTION INTRAVENOUS at 16:51

## 2024-03-03 RX ADMIN — SODIUM CHLORIDE 2000 ML: 0.9 INJECTION, SOLUTION INTRAVENOUS at 11:51

## 2024-03-03 RX ADMIN — DIPHENHYDRAMINE HYDROCHLORIDE 12.5 MG: 50 INJECTION, SOLUTION INTRAMUSCULAR; INTRAVENOUS at 10:55

## 2024-03-03 RX ADMIN — SODIUM CHLORIDE, SODIUM GLUCONATE, SODIUM ACETATE, POTASSIUM CHLORIDE, MAGNESIUM CHLORIDE, SODIUM PHOSPHATE, DIBASIC, AND POTASSIUM PHOSPHATE 125 ML/HR: .53; .5; .37; .037; .03; .012; .00082 INJECTION, SOLUTION INTRAVENOUS at 16:51

## 2024-03-03 RX ADMIN — NICOTINE 1 PATCH: 14 PATCH, EXTENDED RELEASE TRANSDERMAL at 16:50

## 2024-03-03 RX ADMIN — HEPARIN SODIUM 5000 UNITS: 5000 INJECTION INTRAVENOUS; SUBCUTANEOUS at 16:51

## 2024-03-03 RX ADMIN — POTASSIUM CHLORIDE 40 MEQ: 1500 TABLET, EXTENDED RELEASE ORAL at 16:52

## 2024-03-03 RX ADMIN — ALBUMIN (HUMAN) 25 G: 0.25 INJECTION, SOLUTION INTRAVENOUS at 12:32

## 2024-03-03 RX ADMIN — SODIUM CHLORIDE 1000 ML: 0.9 INJECTION, SOLUTION INTRAVENOUS at 14:11

## 2024-03-03 RX ADMIN — SODIUM CHLORIDE 1000 ML: 0.9 INJECTION, SOLUTION INTRAVENOUS at 10:52

## 2024-03-03 NOTE — H&P
[facility]  H&P  Name: Luba Winkler 64 y.o. female I MRN: [id number]  Unit/Bed#: TR 13A I Date of Admission: 3/3/2024   Date of Service: 3/3/2024 I Hospital Day: 0      Assessment/Plan   * DARIN (acute kidney injury) (HCC)  Assessment & Plan  Creatine on admission 2.07, Baseline creatinine 0.7-0.8  Suspect prerenal secondary to dehydration  Hold ACE/ARB and diuretic therapy  Avoid hypotension, nephrotoxins, and NSAIDS if possible    IV fluids   Strict I & Os  Urinary retention protocol and bladder scan     Lab Results   Component Value Date    CREATININE 2.07 (H) 03/03/2024    CREATININE 0.66 02/26/2024       Non-traumatic rhabdomyolysis  Assessment & Plan  Admitted recently for same.  Since being discharged in the hospital, patient has been anxious and unable to sleep.  Has not been eating or drinking.  Has been having excessive stress.  CK on admission is 665  Started on IV fluid rehydration and trend    Toxic encephalopathy  Assessment & Plan  On admission, patient was agitated, anxious, having hallucinations again. Was given benadryl, ativan, and droperidol which subsequently dropped her BP. Still with somnolence and lethargy, and hallucinations/Toxic metabolic encephalopathy suspect again secondary to sleep deprivation, a/e/b somnolence, lethargy and hallucinations, treated previously with discontinuing Xanax and starting Trazodone.   Previously trazodone helped patient and she slept, no further hallucinations. Will continue with trazodone and also melatonin, but consult psych as well  CT head without any acute intracranial abnormalities.   Answering questions appropriately at this time, but very lethargic likely secondary to meds given in ED.     Hallucinations  Assessment & Plan  Recently admitted to hospital for same. Unable to sleep again and anxious. Dizzy and lightheaded. Trazodone not helping her sleep. Is having more visual and auditory hallucinations at home, but has none in  the hospital. Not eating or drinking.  CT head without any acute intracranial abnormalities  Discussed with patient regarding medication changes and still having hallucinations about consulting psych, and patient agreeable to talk with them at this time.   Will consult psych    Essential hypertension  Assessment & Plan  Currently taking lisinopril 20, atenolol 50, Lasix 40.  Blood pressure initially elevated in the emergency department as patient has not been taking meds at home.  Was given Ativan, droperidol, Benadryl to calm her down in the ED however became hypotensive after this.  Critical care evaluated at bedside and said stable for floor, BP improves when you wake patient.   Holding all blood pressure medications at this time and continue on IV fluid rehydration.  Hold lisinopril in light of DARIN.  Resume when able  Avoid hypotension    Hyponatremia  Assessment & Plan  Mildly low sodium at 134 on admission, during previous admission with hyponatremia as well.   Will start on IV fluid rehydration and hold Lasix.  During prior stay, Lasix was decreased to 40 mg daily    Stress at home  Assessment & Plan  Patient has a lot of stress at home, she takes care of her  who has PLS (primary lateral sclerosis)    Myalgia  Assessment & Plan  Secondary to rhabdo.  Still with complaints of pain in the shoulder and neck area.  Continue Voltaren gel    History of Chiari malformation  Assessment & Plan  Had procedure done at [facility] she states and it was a long time ago.  Do not have any records at this time.  She states she is on Effexor for this to help with serotonin    Gastroesophageal reflux disease without esophagitis  Assessment & Plan  Continue PPI         VTE Pharmacologic Prophylaxis: VTE Score: 3 Moderate Risk (Score 3-4) - Pharmacological DVT Prophylaxis Ordered: heparin.  Code Status: Level 1 - Full Code   Discussion with family: Attempted to update  (friend) via phone. Left voicemail.      Anticipated Length of Stay: Patient will be admitted on an inpatient basis with an anticipated length of stay of greater than 2 midnights secondary to DARIN, rhabdo, encephalopathy, hallucinations.    Total Time Spent on Date of Encounter in care of patient: 67 mins. This time was spent on one or more of the following: performing physical exam; counseling and coordination of care; obtaining or reviewing history; documenting in the medical record; reviewing/ordering tests, medications or procedures; communicating with other healthcare professionals and discussing with patient's family/caregivers.    Chief Complaint: hallucinations    History of Present Illness:  Luba Winkler is a 64 y.o. female with a PMH of Chiari malformation status post surgery, hypertension, hyperlipidemia, acid reflux, recent hospitalization for hallucinations/encephalopathy who presents with complaints of not sleeping, not taking medications properly, having auditory and visual hallucinations again for the last few days. Has been unable to sleep and with a headache. Also complains of being dizzy and lightheaded. Also not eating or drinking the last few days because she has been feeling very anxious. No nausea, vomiting, diarrhea, constipation, abdominal pain, CP, SOB, fever, chills. No alcohol usage.     Review of Systems:  Review of Systems   Constitutional:  Positive for appetite change and fatigue. Negative for activity change, chills, fever and unexpected weight change.   HENT: Negative.     Eyes: Negative.    Respiratory:  Negative for cough, chest tightness, shortness of breath and wheezing.    Cardiovascular:  Negative for chest pain, palpitations and leg swelling.   Gastrointestinal:  Negative for abdominal distention, abdominal pain, constipation, diarrhea, nausea and vomiting.   Endocrine: Negative.    Genitourinary: Negative.    Musculoskeletal:  Positive for myalgias. Negative for back pain, gait problem and neck stiffness.    Skin: Negative.    Allergic/Immunologic: Negative.    Neurological:  Positive for dizziness, weakness (generalized), light-headedness and headaches. Negative for seizures, syncope, facial asymmetry and speech difficulty.   Psychiatric/Behavioral:  Positive for agitation and hallucinations. Negative for confusion, self-injury and suicidal ideas. The patient is nervous/anxious.        Past Medical and Surgical History:   Past Medical History:   Diagnosis Date    COPD (chronic obstructive pulmonary disease) (HCC)     Diverticulitis     GERD (gastroesophageal reflux disease)     Hypertension     Psychiatric disorder        Past Surgical History:   Procedure Laterality Date    ABDOMINAL SURGERY      CERVICAL SPINE SURGERY      rods placed in c4,c5,c6       Meds/Allergies:  Prior to Admission medications    Medication Sig Start Date End Date Taking? Authorizing Provider   aspirin (Aspirin 81) 81 mg chewable tablet Chew 81 mg    Historical Provider, MD   atenolol (TENORMIN) 50 mg tablet Take 50 mg by mouth daily at bedtime     Historical Provider, MD   Diclofenac Sodium (VOLTAREN) 1 % Apply 2 g topically 4 (four) times a day 2/26/24   Avani Menon MD   furosemide (Lasix) 40 mg tablet Take 1 tablet (40 mg total) by mouth daily 2/26/24   Avani Menon MD   lisinopril (ZESTRIL) 20 mg tablet Take 20 mg by mouth daily     Historical Provider, MD   loratadine (CLARITIN) 10 mg tablet Take 1 tablet (10 mg total) by mouth daily 2/27/24   Avani Menon MD   magnesium oxide (MAG-OX) 400 mg tablet Take 1 tablet (400 mg total) by mouth daily Do not start before February 27, 2024. 2/27/24   Avani Menon MD   Multiple Vitamins-Minerals (CENTRUM SILVER 50+MEN PO) Take 1 tablet by mouth    Historical Provider, MD   omeprazole (PriLOSEC) 20 mg delayed release capsule Take 20 mg by mouth daily    Historical Provider, MD   simvastatin (ZOCOR) 20 mg tablet Take 20 mg by mouth daily at bedtime    Historical Provider, MD  "  traZODone (DESYREL) 50 mg tablet Take 1 tablet (50 mg total) by mouth daily at bedtime as needed for sleep 2/26/24   Avani Menon MD   venlafaxine (EFFEXOR-XR) 150 mg 24 hr capsule Take 150 mg by mouth daily     Historical Provider, MD   vitamin B-12 (VITAMIN B-12) 1,000 mcg tablet Take by mouth daily    Historical Provider, MD     I have reviewed home medications with patient personally.    Allergies:   Allergies   Allergen Reactions    Avelox [Moxifloxacin]     Celexa [Citalopram]     Dilaudid [Hydromorphone]     Dust Mite Extract Sneezing    Fentanyl Other (See Comments)    Penicillins      psych    Prednisone     Topiramate Nausea Only       Social History:  Marital Status: /Civil Union   Occupation:   Patient Pre-hospital Living Situation: Home  Patient Pre-hospital Level of Mobility: unable to be assessed at time of evaluation  Patient Pre-hospital Diet Restrictions:   Substance Use History:   Social History     Substance and Sexual Activity   Alcohol Use Never     Social History     Tobacco Use   Smoking Status Every Day    Current packs/day: 1.00    Types: Cigarettes   Smokeless Tobacco Never     Social History     Substance and Sexual Activity   Drug Use Never       Family History:  History reviewed. No pertinent family history.    Physical Exam:     Vitals:   Blood Pressure: 92/50 (03/03/24 1530)  Pulse: 55 (03/03/24 1530)  Temperature: 98.6 °F (37 °C) (03/03/24 1019)  Temp Source: Temporal (03/03/24 1019)  Respirations: 15 (03/03/24 1530)  Height: 5' 5\" (165.1 cm) (03/03/24 1019)  SpO2: 93 % (03/03/24 1530)    Physical Exam  Vitals reviewed.   Constitutional:       General: She is not in acute distress.     Appearance: She is ill-appearing. She is not toxic-appearing.      Comments: Laying in bed, wakes to verbal stimuli, but does fall back asleep during conversation. Will wake back up and answer questions appropriately.    HENT:      Head: Normocephalic and atraumatic.      Mouth/Throat:     "  Mouth: Mucous membranes are dry.   Cardiovascular:      Rate and Rhythm: Normal rate and regular rhythm.      Heart sounds: No murmur heard.  Pulmonary:      Effort: No respiratory distress.      Breath sounds: No stridor. No wheezing.      Comments: Decreased breath sounds bilaterally, but poor effort and patient falling asleep during exam. Currently on 2L NC saturating around 95%.  Abdominal:      General: Bowel sounds are normal. There is no distension.      Palpations: Abdomen is soft. There is no mass.      Tenderness: There is no abdominal tenderness.   Musculoskeletal:      Right lower leg: No edema.      Left lower leg: No edema.   Skin:     General: Skin is warm and dry.   Neurological:      Mental Status: She is alert and oriented to person, place, and time.      Comments: Answering orientation questions appropriately, but falling asleep during exam   Psychiatric:         Attention and Perception: She perceives auditory and visual hallucinations.         Speech: Speech is delayed.         Behavior: Behavior is slowed. Behavior is not agitated.         Thought Content: Thought content does not include homicidal or suicidal ideation. Thought content does not include homicidal or suicidal plan.      Comments: Was having visual and auditory hallucinations prior to arrival, when asking about hallucinations, patient unable to explain them better.           Additional Data:     Lab Results:  Results from last 7 days   Lab Units 03/03/24  1049   WBC Thousand/uL 8.67   HEMOGLOBIN g/dL 12.3   HEMATOCRIT % 37.8   PLATELETS Thousands/uL 345   NEUTROS PCT % 67   LYMPHS PCT % 22   MONOS PCT % 8   EOS PCT % 3     Results from last 7 days   Lab Units 03/03/24  1049   SODIUM mmol/L 134*   POTASSIUM mmol/L 3.3*   CHLORIDE mmol/L 95*   CO2 mmol/L 27   BUN mg/dL 35*   CREATININE mg/dL 2.07*   ANION GAP mmol/L 12   CALCIUM mg/dL 10.0   ALBUMIN g/dL 4.0   TOTAL BILIRUBIN mg/dL 1.06*   ALK PHOS U/L 76   ALT U/L 28   AST U/L 44*    GLUCOSE RANDOM mg/dL 107     Results from last 7 days   Lab Units 03/03/24  1049   INR  1.25*             Results from last 7 days   Lab Units 03/03/24  1049   LACTIC ACID mmol/L 1.3       Lines/Drains:  Invasive Devices       Peripheral Intravenous Line  Duration             Peripheral IV 03/03/24 Left Antecubital <1 day    Peripheral IV 03/03/24 Right Antecubital <1 day                        Imaging: Reviewed radiology reports from this admission including: CT head  CT head without contrast   Final Result by Ashley Dozier MD (03/03 1101)      No acute intracranial CT abnormality. No significant interval change.                  Workstation performed: [id number]             EKG and Other Studies Reviewed on Admission:   EKG: NSR. HR 60.    ** Please Note: This note has been constructed using a voice recognition system. **

## 2024-03-03 NOTE — ASSESSMENT & PLAN NOTE
Creatine on admission 2.07, Baseline creatinine 0.7-0.8  Suspect prerenal secondary to dehydration  Hold ACE/ARB and diuretic therapy  Avoid hypotension, nephrotoxins, and NSAIDS if possible    IV fluids   Strict I & Os  Urinary retention protocol and bladder scan     Lab Results   Component Value Date    CREATININE 2.07 (H) 03/03/2024    CREATININE 0.66 02/26/2024

## 2024-03-03 NOTE — ASSESSMENT & PLAN NOTE
Recently admitted to hospital for same. Unable to sleep again and anxious. Dizzy and lightheaded. Trazodone not helping her sleep. Is having more visual and auditory hallucinations at home, but has none in the hospital. Not eating or drinking.  CT head without any acute intracranial abnormalities  Discussed with patient regarding medication changes and still having hallucinations about consulting psych, and patient agreeable to talk with them at this time.   Will consult psych

## 2024-03-03 NOTE — ASSESSMENT & PLAN NOTE
On admission, patient was agitated, anxious, having hallucinations again. Was given benadryl, ativan, and droperidol which subsequently dropped her BP. Still with somnolence and lethargy, and hallucinations/Toxic metabolic encephalopathy suspect again secondary to sleep deprivation, a/e/b somnolence, lethargy and hallucinations, treated previously with discontinuing Xanax and starting Trazodone.   Previously trazodone helped patient and she slept, no further hallucinations. Will continue with trazodone and also melatonin, but consult psych as well  CT head without any acute intracranial abnormalities.   Answering questions appropriately at this time, but very lethargic likely secondary to meds given in ED.

## 2024-03-03 NOTE — ASSESSMENT & PLAN NOTE
Secondary to rhabdo.  Still with complaints of pain in the shoulder and neck area.  Continue Voltaren gel

## 2024-03-03 NOTE — ASSESSMENT & PLAN NOTE
Had procedure done at [facility] she states and it was a long time ago.  Do not have any records at this time.  She states she is on Effexor for this to help with serotonin

## 2024-03-03 NOTE — ED PROVIDER NOTES
History  Chief Complaint   Patient presents with    Psychiatric Evaluation     Pt arrives from home with friend. Pt not sleeping, not taking meds properly and having audio and visual hallucinations per patient and friend.      Patient was recently admitted to this hospital for the same.  Complains of being anxious.  Unable to sleep.  Feels dizzy and lightheaded.  Has a headache.  States the trazodone is not helping her with her sleep.  Does have visual hallucinations.  States that when she is in the hospital she no longer has them.  Has no hallucinations at present.  Patient has not been eating or drinking the last few days.  Has been very anxious.  No vomiting or diarrhea.  No alcohol use.      History provided by:  Patient   used: No    Fatigue  Severity:  Moderate  Onset quality:  Gradual  Duration: Several weeks.  Timing:  Constant  Progression:  Unchanged  Chronicity:  Recurrent  Context: stress    Context: not allergies, not change in medication, not drug use and not urinary tract infection    Relieved by:  Nothing  Worsened by:  Nothing  Ineffective treatments: Trazodone.  Associated symptoms: dizziness    Associated symptoms: no abdominal pain, no chest pain, no cough, no diarrhea, no dysuria, no fever, no frequency, no headaches, no lethargy, no nausea, no near-syncope, no seizures, no shortness of breath, no urgency and no vomiting        Prior to Admission Medications   Prescriptions Last Dose Informant Patient Reported? Taking?   Diclofenac Sodium (VOLTAREN) 1 %   No No   Sig: Apply 2 g topically 4 (four) times a day   Multiple Vitamins-Minerals (CENTRUM SILVER 50+MEN PO)   Yes No   Sig: Take 1 tablet by mouth   aspirin (Aspirin 81) 81 mg chewable tablet   Yes No   Sig: Chew 81 mg   atenolol (TENORMIN) 50 mg tablet  Self Yes No   Sig: Take 50 mg by mouth daily at bedtime    furosemide (Lasix) 40 mg tablet   No No   Sig: Take 1 tablet (40 mg total) by mouth daily   lisinopril (ZESTRIL)  20 mg tablet   Yes No   Sig: Take 20 mg by mouth daily    loratadine (CLARITIN) 10 mg tablet   No No   Sig: Take 1 tablet (10 mg total) by mouth daily   magnesium oxide (MAG-OX) 400 mg tablet   No No   Sig: Take 1 tablet (400 mg total) by mouth daily Do not start before February 27, 2024.   omeprazole (PriLOSEC) 20 mg delayed release capsule   Yes No   Sig: Take 20 mg by mouth daily   simvastatin (ZOCOR) 20 mg tablet   Yes No   Sig: Take 20 mg by mouth daily at bedtime   traZODone (DESYREL) 50 mg tablet   No No   Sig: Take 1 tablet (50 mg total) by mouth daily at bedtime as needed for sleep   venlafaxine (EFFEXOR-XR) 150 mg 24 hr capsule   Yes No   Sig: Take 150 mg by mouth daily    vitamin B-12 (VITAMIN B-12) 1,000 mcg tablet   Yes No   Sig: Take by mouth daily      Facility-Administered Medications: None       Past Medical History:   Diagnosis Date    COPD (chronic obstructive pulmonary disease) (HCC)     Diverticulitis     GERD (gastroesophageal reflux disease)     Hypertension     Psychiatric disorder        Past Surgical History:   Procedure Laterality Date    ABDOMINAL SURGERY      CERVICAL SPINE SURGERY      rods placed in c4,c5,c6       History reviewed. No pertinent family history.  I have reviewed and agree with the history as documented.    E-Cigarette/Vaping    E-Cigarette Use Never User      E-Cigarette/Vaping Substances     Social History     Tobacco Use    Smoking status: Every Day     Current packs/day: 1.00     Types: Cigarettes    Smokeless tobacco: Never   Vaping Use    Vaping status: Never Used   Substance Use Topics    Alcohol use: Never    Drug use: Never       Review of Systems   Constitutional:  Positive for fatigue. Negative for chills and fever.   HENT:  Negative for ear pain, hearing loss, sore throat, trouble swallowing and voice change.    Eyes:  Negative for pain and discharge.   Respiratory:  Negative for cough, shortness of breath and wheezing.    Cardiovascular:  Negative for chest  pain, palpitations and near-syncope.   Gastrointestinal:  Negative for abdominal pain, blood in stool, constipation, diarrhea, nausea and vomiting.   Genitourinary:  Negative for dysuria, flank pain, frequency, hematuria and urgency.   Musculoskeletal:  Negative for joint swelling, neck pain and neck stiffness.   Skin:  Negative for rash and wound.   Neurological:  Positive for dizziness. Negative for seizures, syncope, facial asymmetry and headaches.   Psychiatric/Behavioral:  Negative for hallucinations, self-injury and suicidal ideas.    All other systems reviewed and are negative.      Physical Exam  Physical Exam  Vitals and nursing note reviewed.   Constitutional:       General: She is not in acute distress.     Appearance: She is well-developed.   HENT:      Head: Normocephalic and atraumatic.      Right Ear: External ear normal.      Left Ear: External ear normal.   Eyes:      General: No scleral icterus.        Right eye: No discharge.         Left eye: No discharge.      Extraocular Movements: Extraocular movements intact.      Conjunctiva/sclera: Conjunctivae normal.   Cardiovascular:      Rate and Rhythm: Normal rate and regular rhythm.      Heart sounds: Normal heart sounds. No murmur heard.  Pulmonary:      Effort: Pulmonary effort is normal.      Breath sounds: Normal breath sounds. No wheezing or rales.   Abdominal:      General: Bowel sounds are normal. There is no distension.      Palpations: Abdomen is soft.      Tenderness: There is no abdominal tenderness. There is no guarding or rebound.   Musculoskeletal:         General: No deformity. Normal range of motion.      Cervical back: Normal range of motion and neck supple.   Skin:     General: Skin is warm and dry.      Findings: No rash.   Neurological:      General: No focal deficit present.      Mental Status: She is alert and oriented to person, place, and time.      Cranial Nerves: No cranial nerve deficit.   Psychiatric:      Comments:  Anxious and tearful         Vital Signs  ED Triage Vitals   Temperature Pulse Respirations Blood Pressure SpO2   03/03/24 1019 03/03/24 1019 03/03/24 1019 03/03/24 1025 03/03/24 1019   98.6 °F (37 °C) 61 17 (!) 256/116 95 %      Temp Source Heart Rate Source Patient Position - Orthostatic VS BP Location FiO2 (%)   03/03/24 1019 03/03/24 1019 03/03/24 1019 03/03/24 1019 --   Temporal Monitor Sitting Right arm       Pain Score       03/03/24 1019       5           Vitals:    03/03/24 1315 03/03/24 1330 03/03/24 1345 03/03/24 1415   BP: (!) 86/48 113/59 (!) 77/45 112/55   Pulse: 55 55 55 59   Patient Position - Orthostatic VS: Lying Lying Lying Lying         Visual Acuity  Visual Acuity      Flowsheet Row Most Recent Value   L Pupil Size (mm) 3   R Pupil Size (mm) 3            ED Medications  Medications   sodium chloride 0.9 % bolus 1,000 mL (1,000 mL Intravenous New Bag 3/3/24 1411)   sodium chloride 0.9 % bolus 1,000 mL (0 mL Intravenous Stopped 3/3/24 1152)   LORazepam (ATIVAN) injection 1 mg (1 mg Intravenous Given 3/3/24 1054)   droperidol (INAPSINE) injection 1.25 mg (1.25 mg Intravenous Given 3/3/24 1054)   diphenhydrAMINE (BENADRYL) injection 12.5 mg (12.5 mg Intravenous Given 3/3/24 1055)   sodium chloride 0.9 % bolus 2,000 mL (0 mL Intravenous Stopped 3/3/24 1338)   albumin human (FLEXBUMIN) 25 % injection 25 g (0 g Intravenous Stopped 3/3/24 1316)   albumin human (FLEXBUMIN) 25 % injection 12.5 g (12.5 g Intravenous New Bag 3/3/24 1411)       Diagnostic Studies  Results Reviewed       Procedure Component Value Units Date/Time    HS Troponin 0hr (reflex protocol) [[id number]]  (Normal) Collected: 03/03/24 1049    Lab Status: Final result Specimen: Blood from Arm, Left Updated: 03/03/24 1127     hs TnI 0hr 6 ng/L     Lactic acid, plasma (w/reflex if result > 2.0) [[id number]]  (Normal) Collected: 03/03/24 1049    Lab Status: Final result Specimen: Blood from Arm, Left Updated: 03/03/24 1121     LACTIC ACID 1.3  mmol/L     Narrative:      Result may be elevated if tourniquet was used during collection.    Comprehensive metabolic panel [[id number]]  (Abnormal) Collected: 03/03/24 1049    Lab Status: Final result Specimen: Blood from Arm, Left Updated: 03/03/24 1121     Sodium 134 mmol/L      Potassium 3.3 mmol/L      Chloride 95 mmol/L      CO2 27 mmol/L      ANION GAP 12 mmol/L      BUN 35 mg/dL      Creatinine 2.07 mg/dL      Glucose 107 mg/dL      Calcium 10.0 mg/dL      AST 44 U/L      ALT 28 U/L      Alkaline Phosphatase 76 U/L      Total Protein 7.4 g/dL      Albumin 4.0 g/dL      Total Bilirubin 1.06 mg/dL      eGFR 24 ml/min/1.73sq m     Narrative:      National Kidney Disease Foundation guidelines for Chronic Kidney Disease (CKD):     Stage 1 with normal or high GFR (GFR > 90 mL/min/1.73 square meters)    Stage 2 Mild CKD (GFR = 60-89 mL/min/1.73 square meters)    Stage 3A Moderate CKD (GFR = 45-59 mL/min/1.73 square meters)    Stage 3B Moderate CKD (GFR = 30-44 mL/min/1.73 square meters)    Stage 4 Severe CKD (GFR = 15-29 mL/min/1.73 square meters)    Stage 5 End Stage CKD (GFR <15 mL/min/1.73 square meters)  Note: GFR calculation is accurate only with a steady state creatinine    Lipase [[id number]]  (Normal) Collected: 03/03/24 1049    Lab Status: Final result Specimen: Blood from Arm, Left Updated: 03/03/24 1121     Lipase 34 u/L     CK [[id number]]  (Abnormal) Collected: 03/03/24 1049    Lab Status: Final result Specimen: Blood from Arm, Left Updated: 03/03/24 1121     Total  U/L     Magnesium [[id number]]  (Abnormal) Collected: 03/03/24 1049    Lab Status: Final result Specimen: Blood from Arm, Left Updated: 03/03/24 1121     Magnesium 1.8 mg/dL     Protime-INR [[id number]]  (Abnormal) Collected: 03/03/24 1049    Lab Status: Final result Specimen: Blood from Arm, Left Updated: 03/03/24 1117     Protime 16.0 seconds      INR 1.25    APTT [[id number]]  (Abnormal) Collected: 03/03/24 1049    Lab Status: Final  result Specimen: Blood from Arm, Left Updated: 03/03/24 1117     PTT 52 seconds     CBC and differential [[id number]] Collected: 03/03/24 1049    Lab Status: Final result Specimen: Blood from Arm, Left Updated: 03/03/24 1056     WBC 8.67 Thousand/uL      RBC 4.22 Million/uL      Hemoglobin 12.3 g/dL      Hematocrit 37.8 %      MCV 90 fL      MCH 29.1 pg      MCHC 32.5 g/dL      RDW 14.5 %      MPV 9.2 fL      Platelets 345 Thousands/uL      nRBC 0 /100 WBCs      Neutrophils Relative 67 %      Immat GRANS % 0 %      Lymphocytes Relative 22 %      Monocytes Relative 8 %      Eosinophils Relative 3 %      Basophils Relative 0 %      Neutrophils Absolute 5.77 Thousands/µL      Immature Grans Absolute 0.03 Thousand/uL      Lymphocytes Absolute 1.92 Thousands/µL      Monocytes Absolute 0.67 Thousand/µL      Eosinophils Absolute 0.25 Thousand/µL      Basophils Absolute 0.03 Thousands/µL                    CT head without contrast   Final Result by Ashley Dozier MD (03/03 1101)      No acute intracranial CT abnormality. No significant interval change.                  Workstation performed: [id number]                    Procedures  ECG 12 Lead Documentation Only    Date/Time: 3/3/2024 10:30 AM    Performed by: Hermann Cooper MD  Authorized by: Hermann Cooper MD    ECG reviewed by me, the ED Provider: yes    Patient location:  ED  Rate:     ECG rate:  60  Rhythm:     Rhythm: sinus rhythm    Ectopy:     Ectopy: none    QRS:     QRS axis:  Normal           ED Course  ED Course as of 03/03/24 1417   Sun Mar 03, 2024   1059 Offered to have her talk to behavioral health.  Does not wish to talk to them at this time.  Denies any suicidal or homicidal ideation.  At this time there are no grounds for 302 commitment.   1332 Systolic blood pressure dipped to mid 80s while she was asleep.  Patient now awake and systolic blood pressure at 113/59.  Patient currently has no complaints.  Feels better.   1417 Patient seen by critical care.   Blood pressure was in the 70s.  When you wake her up the blood pressure goes up to 110s.  Asymptomatic.  Does not need critical care at this time.                               SBIRT 22yo+      Flowsheet Row Most Recent Value   Initial Alcohol Screen: US AUDIT-C     1. How often do you have a drink containing alcohol? 0 Filed at: 03/03/2024 1056   2. How many drinks containing alcohol do you have on a typical day you are drinking?  0 Filed at: 03/03/2024 1056   3b. FEMALE Any Age, or MALE 65+: How often do you have 4 or more drinks on one occassion? 0 Filed at: 03/03/2024 1056   Audit-C Score 0 Filed at: 03/03/2024 1056   MIKE: How many times in the past year have you...    Used an illegal drug or used a prescription medication for non-medical reasons? Never Filed at: 03/03/2024 1056                      Medical Decision Making  Amount and/or Complexity of Data Reviewed  Labs: ordered.  Radiology: ordered.    Risk  Prescription drug management.  Decision regarding hospitalization.             Disposition  Final diagnoses:   DARIN (acute kidney injury) (HCC)   Rhabdomyolysis     Time reflects when diagnosis was documented in both MDM as applicable and the Disposition within this note       Time User Action Codes Description Comment    3/3/2024 11:34 AM Hermann Cooper Add [N17.9] DARIN (acute kidney injury) (HCC)     3/3/2024 11:34 AM Hermann Cooper Add [M62.82] Rhabdomyolysis           ED Disposition       ED Disposition   Admit    Condition   Stable    Date/Time   Sun Mar 3, 2024 1255    Comment   Case was discussed with Dr. Diaz and the patient's admission status was agreed to be Admission Status: inpatient status to the service of Dr. Diaz.               Follow-up Information    None         Patient's Medications   Discharge Prescriptions    No medications on file       No discharge procedures on file.    PDMP Review         Value Time User    PDMP Reviewed  Yes 11/24/2020 11:31 AM Vijay Diaz MD             ED Provider  Electronically Signed by             Hermann Cooper MD  03/03/24 8613       Hermann Cooper MD  03/03/24 0244

## 2024-03-03 NOTE — ASSESSMENT & PLAN NOTE
Admitted recently for same.  Since being discharged in the hospital, patient has been anxious and unable to sleep.  Has not been eating or drinking.  Has been having excessive stress.  CK on admission is 665  Started on IV fluid rehydration and trend

## 2024-03-03 NOTE — ASSESSMENT & PLAN NOTE
Currently taking lisinopril 20, atenolol 50, Lasix 40.  Blood pressure initially elevated in the emergency department as patient has not been taking meds at home.  Was given Ativan, droperidol, Benadryl to calm her down in the ED however became hypotensive after this.  Critical care evaluated at bedside and said stable for floor, BP improves when you wake patient.   Holding all blood pressure medications at this time and continue on IV fluid rehydration.  Hold lisinopril in light of DARIN.  Resume when able  Avoid hypotension

## 2024-03-04 ENCOUNTER — TELEPHONE (OUTPATIENT)
Dept: PSYCHIATRY | Facility: CLINIC | Age: 64
End: 2024-03-04

## 2024-03-04 LAB
25(OH)D3 SERPL-MCNC: 50.6 NG/ML (ref 30–100)
ALBUMIN SERPL BCP-MCNC: 3.5 G/DL (ref 3.5–5)
ALP SERPL-CCNC: 63 U/L (ref 34–104)
ALT SERPL W P-5'-P-CCNC: 31 U/L (ref 7–52)
ANION GAP SERPL CALCULATED.3IONS-SCNC: 8 MMOL/L
AST SERPL W P-5'-P-CCNC: 50 U/L (ref 13–39)
ATRIAL RATE: 58 BPM
BASOPHILS # BLD AUTO: 0.03 THOUSANDS/ÂΜL (ref 0–0.1)
BASOPHILS NFR BLD AUTO: 0 % (ref 0–1)
BILIRUB SERPL-MCNC: 0.88 MG/DL (ref 0.2–1)
BUN SERPL-MCNC: 22 MG/DL (ref 5–25)
CALCIUM SERPL-MCNC: 8.6 MG/DL (ref 8.4–10.2)
CHLORIDE SERPL-SCNC: 108 MMOL/L (ref 96–108)
CK SERPL-CCNC: 548 U/L (ref 26–192)
CO2 SERPL-SCNC: 25 MMOL/L (ref 21–32)
CREAT SERPL-MCNC: 1 MG/DL (ref 0.6–1.3)
EOSINOPHIL # BLD AUTO: 0.28 THOUSAND/ÂΜL (ref 0–0.61)
EOSINOPHIL NFR BLD AUTO: 4 % (ref 0–6)
ERYTHROCYTE [DISTWIDTH] IN BLOOD BY AUTOMATED COUNT: 14.9 % (ref 11.6–15.1)
GFR SERPL CREATININE-BSD FRML MDRD: 58 ML/MIN/1.73SQ M
GLUCOSE SERPL-MCNC: 116 MG/DL (ref 65–140)
HCT VFR BLD AUTO: 34 % (ref 34.8–46.1)
HGB BLD-MCNC: 10.9 G/DL (ref 11.5–15.4)
IMM GRANULOCYTES # BLD AUTO: 0.02 THOUSAND/UL (ref 0–0.2)
IMM GRANULOCYTES NFR BLD AUTO: 0 % (ref 0–2)
LYMPHOCYTES # BLD AUTO: 1.54 THOUSANDS/ÂΜL (ref 0.6–4.47)
LYMPHOCYTES NFR BLD AUTO: 22 % (ref 14–44)
MAGNESIUM SERPL-MCNC: 2.1 MG/DL (ref 1.9–2.7)
MCH RBC QN AUTO: 29.5 PG (ref 26.8–34.3)
MCHC RBC AUTO-ENTMCNC: 32.1 G/DL (ref 31.4–37.4)
MCV RBC AUTO: 92 FL (ref 82–98)
MONOCYTES # BLD AUTO: 0.36 THOUSAND/ÂΜL (ref 0.17–1.22)
MONOCYTES NFR BLD AUTO: 5 % (ref 4–12)
NEUTROPHILS # BLD AUTO: 4.8 THOUSANDS/ÂΜL (ref 1.85–7.62)
NEUTS SEG NFR BLD AUTO: 69 % (ref 43–75)
NRBC BLD AUTO-RTO: 0 /100 WBCS
P AXIS: 56 DEGREES
PLATELET # BLD AUTO: 252 THOUSANDS/UL (ref 149–390)
PMV BLD AUTO: 9.4 FL (ref 8.9–12.7)
POTASSIUM SERPL-SCNC: 3.6 MMOL/L (ref 3.5–5.3)
PR INTERVAL: 158 MS
PROT SERPL-MCNC: 6.3 G/DL (ref 6.4–8.4)
QRS AXIS: 39 DEGREES
QRSD INTERVAL: 88 MS
QT INTERVAL: 492 MS
QTC INTERVAL: 482 MS
RBC # BLD AUTO: 3.69 MILLION/UL (ref 3.81–5.12)
SODIUM SERPL-SCNC: 141 MMOL/L (ref 135–147)
T WAVE AXIS: 49 DEGREES
TSH SERPL DL<=0.05 MIU/L-ACNC: 0.86 UIU/ML (ref 0.45–4.5)
VENTRICULAR RATE: 58 BPM
WBC # BLD AUTO: 7.03 THOUSAND/UL (ref 4.31–10.16)

## 2024-03-04 PROCEDURE — 80053 COMPREHEN METABOLIC PANEL: CPT

## 2024-03-04 PROCEDURE — 85025 COMPLETE CBC W/AUTO DIFF WBC: CPT

## 2024-03-04 PROCEDURE — 82306 VITAMIN D 25 HYDROXY: CPT

## 2024-03-04 PROCEDURE — 83735 ASSAY OF MAGNESIUM: CPT

## 2024-03-04 PROCEDURE — G0427 INPT/ED TELECONSULT70: HCPCS | Performed by: PSYCHIATRY & NEUROLOGY

## 2024-03-04 PROCEDURE — 84443 ASSAY THYROID STIM HORMONE: CPT

## 2024-03-04 PROCEDURE — 99232 SBSQ HOSP IP/OBS MODERATE 35: CPT

## 2024-03-04 PROCEDURE — 82550 ASSAY OF CK (CPK): CPT

## 2024-03-04 RX ORDER — QUETIAPINE FUMARATE 25 MG/1
25 TABLET, FILM COATED ORAL
Status: DISCONTINUED | OUTPATIENT
Start: 2024-03-04 | End: 2024-03-06 | Stop reason: HOSPADM

## 2024-03-04 RX ADMIN — VENLAFAXINE HYDROCHLORIDE 150 MG: 150 CAPSULE, EXTENDED RELEASE ORAL at 08:09

## 2024-03-04 RX ADMIN — Medication 2 G: at 21:32

## 2024-03-04 RX ADMIN — Medication 2 G: at 13:18

## 2024-03-04 RX ADMIN — NICOTINE 1 PATCH: 14 PATCH, EXTENDED RELEASE TRANSDERMAL at 08:18

## 2024-03-04 RX ADMIN — CYANOCOBALAMIN TAB 500 MCG 1000 MCG: 500 TAB at 08:09

## 2024-03-04 RX ADMIN — Medication 2 G: at 17:37

## 2024-03-04 RX ADMIN — HEPARIN SODIUM 5000 UNITS: 5000 INJECTION INTRAVENOUS; SUBCUTANEOUS at 21:27

## 2024-03-04 RX ADMIN — Medication 2 G: at 08:19

## 2024-03-04 RX ADMIN — ASPIRIN 81 MG 81 MG: 81 TABLET ORAL at 08:09

## 2024-03-04 RX ADMIN — HEPARIN SODIUM 5000 UNITS: 5000 INJECTION INTRAVENOUS; SUBCUTANEOUS at 13:18

## 2024-03-04 RX ADMIN — Medication 400 MG: at 08:09

## 2024-03-04 RX ADMIN — SODIUM CHLORIDE 1000 ML: 0.9 INJECTION, SOLUTION INTRAVENOUS at 00:28

## 2024-03-04 RX ADMIN — LORATADINE 10 MG: 10 TABLET ORAL at 08:09

## 2024-03-04 RX ADMIN — SODIUM CHLORIDE, SODIUM GLUCONATE, SODIUM ACETATE, POTASSIUM CHLORIDE, MAGNESIUM CHLORIDE, SODIUM PHOSPHATE, DIBASIC, AND POTASSIUM PHOSPHATE 125 ML/HR: .53; .5; .37; .037; .03; .012; .00082 INJECTION, SOLUTION INTRAVENOUS at 04:35

## 2024-03-04 RX ADMIN — SODIUM CHLORIDE, SODIUM GLUCONATE, SODIUM ACETATE, POTASSIUM CHLORIDE, MAGNESIUM CHLORIDE, SODIUM PHOSPHATE, DIBASIC, AND POTASSIUM PHOSPHATE 100 ML/HR: .53; .5; .37; .037; .03; .012; .00082 INJECTION, SOLUTION INTRAVENOUS at 14:55

## 2024-03-04 RX ADMIN — QUETIAPINE FUMARATE 25 MG: 25 TABLET ORAL at 21:27

## 2024-03-04 RX ADMIN — HEPARIN SODIUM 5000 UNITS: 5000 INJECTION INTRAVENOUS; SUBCUTANEOUS at 04:56

## 2024-03-04 RX ADMIN — PANTOPRAZOLE SODIUM 40 MG: 40 TABLET, DELAYED RELEASE ORAL at 04:56

## 2024-03-04 NOTE — TELEPHONE ENCOUNTER
Consult placed on [facility] queue at 0907 to be seen by an [facility] psychiatrist.

## 2024-03-04 NOTE — ASSESSMENT & PLAN NOTE
Had procedure done at [facility] she states and it was a long time ago.  Do not have any records at this time.  She states she is on Effexor for this to help with serotonin

## 2024-03-04 NOTE — CASE MANAGEMENT
Case Management Assessment & Discharge Planning Note    Patient name Luba Winkler  Location /-01 MRN [id number]  : 1960 Date 3/4/2024       Current Admission Date: 3/3/2024  Current Admission Diagnosis:DARIN (acute kidney injury) (HCC)   Patient Active Problem List    Diagnosis Date Noted    DARIN (acute kidney injury) (HCC) 2024    Non-traumatic rhabdomyolysis 2024    Myalgia 2024    Hallucinations 2024    Toxic encephalopathy 2024    Stress at home 2024    Hyponatremia 2024    Hypokalemia 2024    COVID-19 virus infection 2020    Hypophosphatemia 2020    Hypomagnesemia 2020    Essential hypertension 2020    Gastroesophageal reflux disease without esophagitis 2020    History of Chiari malformation 2020    Class 3 severe obesity due to excess calories in adult (HCC) 2020    Other hyperlipidemia 2020    History of COPD 2020    Current smoker 2020    Anxiety 2020      LOS (days): 1  Geometric Mean LOS (GMLOS) (days):   Days to GMLOS:     OBJECTIVE:  PATIENT READMITTED TO HOSPITAL  Risk of Unplanned Readmission Score: 14.47         Current admission status: Inpatient       Preferred Pharmacy:   [facility]/pharmacy #[id number] [address] [address]  Phone: [phone] Fax: [phone]    [facility] - [facility] [place] [place] -  [address] [place] [address]  Phone: [phone] Fax: [phone]    Primary Care Provider: Jabier Cabrales DO    Primary Insurance: MEDICARE  Secondary Insurance: AARP    ASSESSMENT:  Active Health Care Proxies       Keyanna Caraballo Health Care Representative - Friend   Primary Phone: [phone] (Mobile)                 Advance Directives  Does patient have a Health Care POA?: Yes  Does patient have Advance Directives?: No  Was patient offered paperwork?: No (AMS)  Primary  Contact: Keyanna Caraballo    Readmission Root Cause  30 Day Readmission: Yes  Who directed you to return to the hospital?: Self  Did you understand whom to contact if you had questions or problems?: Refused to provide information  Did you get your prescriptions before you left the hospital?: Refused to provide information  Were you able to get your prescriptions filled when you left the hospital?: Refused to provide information  Did you take your medications as prescribed?: Refused to provide information  Were you able to get to your follow-up appointments?: Refused to provide information  Patient was readmitted due to: Hallucinations, encephalopahy  Action Plan: Psych consult, plan TBD>  CM can make PCP follow up appt prior to dc.    Patient Information  Admitted from:: Home  Mental Status: Confused  During Assessment patient was accompanied by: Not accompanied during assessment  Assessment information provided by:: Other - please comment (Chart review, patient poor historian today)  Support Systems: Friend  What [place] you [place]  Home entry access options. Select all that apply.: Stairs  Type of Current Residence: Bi-level  Upon entering residence, is there a bedroom on the main floor (no further steps)?: No  Upon entering residence, is there a bathroom on the main floor (no further steps)?: No  Living Arrangements: Lives w/ Friend  Is patient a ?: No    Activities of Daily Living Prior to Admission  Functional Status: Independent  Completes ADLs independently?: Yes  Ambulates independently?: Yes  Does patient use assisted devices?: No  Does patient currently own DME?: No  Does patient have a history of Outpatient Therapy (PT/OT)?: No  Does the patient have a history of Short-Term Rehab?: No  Does patient have a history of HHC?: No  Does patient currently have HHC?: No         Patient Information Continued  Income Source: Other (Comment) (Unable to determine, pt 63 yo.)  Does patient have  prescription coverage?: Yes  Does patient receive dialysis treatments?: No  Does patient have a history of substance abuse?: No  Does patient have a history of Mental Health Diagnosis?: Yes  Is patient receiving treatment for mental health?: Yes (Psych consult, adjustment of medications)  Has patient received inpatient treatment related to mental health in the last 2 years?: Yes (Readmit due to hallucinations)         Means of Transportation  Means of Transport to Appts:: Drives Self      Social Determinants of Health (SDOH)      Flowsheet Row Most Recent Value   Housing Stability    In the last 12 months, was there a time when you were not able to pay the mortgage or rent on time? N   In the last 12 months, how many places have you lived? 1   In the last 12 months, was there a time when you did not have a steady place to sleep or slept in a shelter (including now)? N   Transportation Needs    In the past 12 months, has lack of transportation kept you from medical appointments or from getting medications? no   In the past 12 months, has lack of transportation kept you from meetings, work, or from getting things needed for daily living? No   Food Insecurity    Within the past 12 months, you worried that your food would run out before you got the money to buy more. Never true   Within the past 12 months, the food you bought just didn't last and you didn't have money to get more. Never true   Utilities    In the past 12 months has the electric, gas, oil, or water company threatened to shut off services in your home? No            DISCHARGE DETAILS:    Discharge planning discussed with:: Chart review.  DCP: Undetermined. Psych following, possible need for IP psych.  CM following.          CM contacted family/caregiver?: No- see comments  Were Treatment Team discharge recommendations reviewed with patient/caregiver?: No  Did patient/caregiver verbalize understanding of patient care needs?: No  Were patient/caregiver  advised of the risks associated with not following Treatment Team discharge recommendations?: No- see comments (Patient being seen by psych.  Chart review for initial assessment, patient not able to provide accurate information at this time.)

## 2024-03-04 NOTE — ASSESSMENT & PLAN NOTE
Recently admitted to hospital for same. Unable to sleep again and anxious. Dizzy and lightheaded. Trazodone not helping her sleep. Is having more visual and auditory hallucinations at home, but has none in the hospital. Not eating or drinking.  CT head without any acute intracranial abnormalities  Discussed with patient regarding medication changes and still having hallucinations about consulting psych, and patient agreeable to talk with them at this time.   Psych: seroquel 25 mg nightly in adjunct to venlafaxine, can be further titrated to effect if indicated as tolerated. Check vitamin D, TSH, RBC folate, RPR. If significant improvement of mental status not observed before discharge, reconsult psych. Would benefit from outpatient psychiatric follow-up for medication management with a psychotherapy/counseling component to assist her in optimizing her coping skills

## 2024-03-04 NOTE — PROGRESS NOTES
[facility]  Progress Note  Name: Luba Winkler I  MRN: [id number]  Unit/Bed#: -01 I Date of Admission: 3/3/2024   Date of Service: 3/4/2024 I Hospital Day: 1    Assessment/Plan   * DARIN (acute kidney injury) (HCC)  Assessment & Plan  Creatine on admission 2.07, Baseline creatinine 0.7-0.8  Suspect prerenal secondary to dehydration  Hold ACE/ARB and diuretic therapy  Avoid hypotension, nephrotoxins, and NSAIDS if possible    IV fluids   Strict I & Os  Urinary retention protocol and bladder scan   Creatinine improved, continue to trend    Lab Results   Component Value Date    CREATININE 1.00 03/04/2024    CREATININE 2.07 (H) 03/03/2024       Non-traumatic rhabdomyolysis  Assessment & Plan  Admitted recently for same.  Since being discharged in the hospital, patient has been anxious and unable to sleep.  Has not been eating or drinking.  Has been having excessive stress.  CK on admission is 665 > 548  Started on IV fluid rehydration and trend    Toxic encephalopathy  Assessment & Plan  On admission, patient was agitated, anxious, having hallucinations again. Was given benadryl, ativan, and droperidol which subsequently dropped her BP. Still with somnolence and lethargy, and hallucinations/Toxic metabolic encephalopathy suspect again secondary to sleep deprivation, a/e/b somnolence, lethargy and hallucinations, treated previously with discontinuing Xanax and starting Trazodone.   Previously trazodone helped patient and she slept, no further hallucinations. Will continue with trazodone and also melatonin, but consult psych as well  CT head without any acute intracranial abnormalities.   Answering questions appropriately at this time, but very lethargic likely secondary to meds given in ED.     Hallucinations  Assessment & Plan  Recently admitted to hospital for same. Unable to sleep again and anxious. Dizzy and lightheaded. Trazodone not helping her sleep. Is having more visual and  auditory hallucinations at home, but has none in the hospital. Not eating or drinking.  CT head without any acute intracranial abnormalities  Discussed with patient regarding medication changes and still having hallucinations about consulting psych, and patient agreeable to talk with them at this time.   Psych: seroquel 25 mg nightly in adjunct to venlafaxine, can be further titrated to effect if indicated as tolerated. Check vitamin D, TSH, RBC folate, RPR. If significant improvement of mental status not observed before discharge, reconsult psych. Would benefit from outpatient psychiatric follow-up for medication management with a psychotherapy/counseling component to assist her in optimizing her coping skills    Essential hypertension  Assessment & Plan  Currently taking lisinopril 20, atenolol 50, Lasix 40.  Blood pressure initially elevated in the emergency department as patient has not been taking meds at home.  Was given Ativan, droperidol, Benadryl to calm her down in the ED however became hypotensive after this.  Critical care evaluated at bedside and said stable for floor, BP improves when you wake patient.   Holding all blood pressure medications at this time and continue on IV fluid rehydration.  Hold lisinopril in light of DARIN.  Resume when able  Avoid hypotension    Hyponatremia  Assessment & Plan  Mildly low sodium at 134 on admission, during previous admission with hyponatremia as well.   Will start on IV fluid rehydration and hold Lasix.  During prior stay, Lasix was decreased to 40 mg daily    Stress at home  Assessment & Plan  Patient has a lot of stress at home, she takes care of her  who has PLS (primary lateral sclerosis)    Myalgia  Assessment & Plan  Secondary to rhabdo.  Still with complaints of pain in the shoulder and neck area.  Continue Voltaren gel    History of Chiari malformation  Assessment & Plan  Had procedure done at [facility] she states and it was a long time ago.  Do  not have any records at this time.  She states she is on Effexor for this to help with serotonin    Gastroesophageal reflux disease without esophagitis  Assessment & Plan  Continue PPI         VTE Pharmacologic Prophylaxis: VTE Score: 3 Moderate Risk (Score 3-4) - Pharmacological DVT Prophylaxis Ordered: heparin.    Mobility:   Basic Mobility Inpatient Raw Score: 23  JH-HLM Goal: 7: Walk 25 feet or more  JH-HLM Achieved: 6: Walk 10 steps or more  HLM Goal NOT achieved. Continue with multidisciplinary rounding and encourage appropriate mobility to improve upon HLM goals.    Patient Centered Rounds: I performed bedside rounds with nursing staff today.   Discussions with Specialists or Other Care Team Provider: nursing, case management, behavioral health    Education and Discussions with Family / Patient: Updated  (friend) via phone.    Total Time Spent on Date of Encounter in care of patient: 35 mins. This time was spent on one or more of the following: performing physical exam; counseling and coordination of care; obtaining or reviewing history; documenting in the medical record; reviewing/ordering tests, medications or procedures; communicating with other healthcare professionals and discussing with patient's family/caregivers.    Current Length of Stay: 1 day(s)  Current Patient Status: Inpatient   Certification Statement: The patient will continue to require additional inpatient hospital stay due to ivf rehydration, monitoring mental status.  Discharge Plan: Anticipate discharge in 48-72 hrs to home or further recommendations per psych based on improvement in mental status    Code Status: Level 1 - Full Code    Subjective:   Patient seen and examined today. She is feeling significantly better than she was yesterday when she came in. She said the hallucinations have improved. She doesn't have any nausea, vomiting, diarrhea, constipation, abdominal pain, CP, SOB, fever, chills. She is glad to be in the  hospital getting better.    Objective:     Vitals:   Temp (24hrs), Av.6 °F (36.4 °C), Min:97.3 °F (36.3 °C), Max:97.9 °F (36.6 °C)    Temp:  [97.3 °F (36.3 °C)-97.9 °F (36.6 °C)] 97.9 °F (36.6 °C)  HR:  [55-68] 67  Resp:  [15-20] 17  BP: ()/(49-72) 102/49  SpO2:  [85 %-99 %] 85 %  Body mass index is 38.57 kg/m².     Input and Output Summary (last 24 hours):     Intake/Output Summary (Last 24 hours) at 3/4/2024 1431  Last data filed at 3/4/2024 1256  Gross per 24 hour   Intake 2290 ml   Output 1024 ml   Net 1266 ml       Physical Exam:   Physical Exam  Vitals reviewed.   Constitutional:       General: She is not in acute distress.     Appearance: She is obese. She is not ill-appearing or toxic-appearing.      Comments: Much more awake and alert today   HENT:      Head: Normocephalic and atraumatic.      Mouth/Throat:      Mouth: Mucous membranes are moist.   Cardiovascular:      Rate and Rhythm: Normal rate and regular rhythm.      Heart sounds: No murmur heard.  Pulmonary:      Effort: No respiratory distress.      Breath sounds: No stridor. No wheezing.      Comments: Saturating well on room air  Abdominal:      General: There is no distension.      Palpations: There is no mass.      Tenderness: There is no abdominal tenderness.   Musculoskeletal:      Right lower leg: No edema.      Left lower leg: No edema.   Skin:     General: Skin is warm and dry.   Neurological:      General: No focal deficit present.      Mental Status: She is alert and oriented to person, place, and time.      Comments: Answering orientation questions appropriately, still with some slowed responses and also having hallucinations intermittently   Psychiatric:         Attention and Perception: She perceives auditory and visual hallucinations.         Speech: Speech is delayed.          Additional Data:     Labs:  Results from last 7 days   Lab Units 24  0516   WBC Thousand/uL 7.03   HEMOGLOBIN g/dL 10.9*   HEMATOCRIT % 34.0*    PLATELETS Thousands/uL 252   NEUTROS PCT % 69   LYMPHS PCT % 22   MONOS PCT % 5   EOS PCT % 4     Results from last 7 days   Lab Units 03/04/24  0516   SODIUM mmol/L 141   POTASSIUM mmol/L 3.6   CHLORIDE mmol/L 108   CO2 mmol/L 25   BUN mg/dL 22   CREATININE mg/dL 1.00   ANION GAP mmol/L 8   CALCIUM mg/dL 8.6   ALBUMIN g/dL 3.5   TOTAL BILIRUBIN mg/dL 0.88   ALK PHOS U/L 63   ALT U/L 31   AST U/L 50*   GLUCOSE RANDOM mg/dL 116     Results from last 7 days   Lab Units 03/03/24  1049   INR  1.25*             Results from last 7 days   Lab Units 03/03/24  1049   LACTIC ACID mmol/L 1.3       Lines/Drains:  Invasive Devices       Peripheral Intravenous Line  Duration             Peripheral IV 03/03/24 Left Antecubital 1 day    Peripheral IV 03/03/24 Right Antecubital 1 day                          Imaging: No pertinent imaging reviewed.    Recent Cultures (last 7 days):         Last 24 Hours Medication List:   Current Facility-Administered Medications   Medication Dose Route Frequency Provider Last Rate    acetaminophen  650 mg Oral Q6H PRN Kristine Justin, PA-C      aspirin  81 mg Oral Daily Kristine Justin, PA-C      vitamin B-12  1,000 mcg Oral Daily Kristine Justin, PA-KARUNA      Diclofenac Sodium  2 g Topical 4x Daily VEE Alarcon-KARUNA      heparin (porcine)  5,000 Units Subcutaneous Q8H LETTY Kristine Justin, PA-C      loratadine  10 mg Oral Daily Kristine Justin, PA-C      magnesium Oxide  400 mg Oral Daily Kristine Justin, PA-C      multi-electrolyte  100 mL/hr Intravenous Continuous Kristine Justin, PA-C 100 mL/hr (03/04/24 1014)    nicotine  1 patch Transdermal Daily Kristine Justin, PA-C      ondansetron  4 mg Intravenous Q6H PRN Kristine Justin, PA-KARUNA      pantoprazole  40 mg Oral Early Morning Kristine Justin, PA-C      QUEtiapine  25 mg Oral HS Kristine Justin, PA-C      traZODone  50 mg Oral HS PRN Kristine Justin, PA-C      venlafaxine  150 mg Oral Daily Kristine Lui  KRISTIN Justin          Today, Patient Was Seen By: Kristine Justin PA-C    **Please Note: This note may have been constructed using a voice recognition system.**

## 2024-03-04 NOTE — PLAN OF CARE

## 2024-03-04 NOTE — ASSESSMENT & PLAN NOTE
Creatine on admission 2.07, Baseline creatinine 0.7-0.8  Suspect prerenal secondary to dehydration  Hold ACE/ARB and diuretic therapy  Avoid hypotension, nephrotoxins, and NSAIDS if possible    IV fluids   Strict I & Os  Urinary retention protocol and bladder scan   Creatinine improved, continue to trend    Lab Results   Component Value Date    CREATININE 1.00 03/04/2024    CREATININE 2.07 (H) 03/03/2024

## 2024-03-04 NOTE — PLAN OF CARE

## 2024-03-04 NOTE — TELEMEDICINE
TeleConsultation - Behavioral Health   Luba Winkler 64 y.o. female MRN: [id number]  Unit/Bed#: -01 Encounter: [id number]        REQUIRED DOCUMENTATION:     1. This service was provided via Telemedicine.  2. [place] at [place]  3. TeleMed provider: Олег Parks MD.  4. Identify all parties in room with patient during tele consult:  pt  5.Patient was then informed that this was a Telemedicine visit and that the exam was being conducted confidentially over secure lines. My office door was closed. No one else was in the room.  Patient acknowledged consent and understanding of privacy and security of the Telemedicine visit, and gave us permission to have the assistant stay in the room in order to assist with the history and to conduct the exam.  I informed the patient that I have reviewed their record in Epic and presented the opportunity for them to ask any questions regarding the visit today.  The patient agreed to participate.       Assessment/Plan     Present on Admission:   Essential hypertension   Gastroesophageal reflux disease without esophagitis   Hyponatremia   Non-traumatic rhabdomyolysis   Myalgia   Hallucinations   Toxic encephalopathy    Assessment:    Unspecified mood disorder; unspecified psychosis; toxic metabolic encephalopathy/delirium in setting of sleep deprivation and hyponatremia    Treatment Plan:    Recommend Seroquel 25 mg p.o. nightly as antipsychotic, as antidepressant adjunct to venlafaxine as well as for insomnia and decreased appetite.  This may be further titrated to effect if indicated as tolerated.  Continue venlafaxine at current dose.  If not recently checked would obtain the following labs to rule out other potential contributing factors: Serum 25-hydroxy vitamin D level, TSH, RBC folate, RPR.  No suicide precautions are indicated.  The patient is in agreement with this plan.  She gives informed consent for the medication.  If significant improvement in mental status is  not observed before discharge, reconsult psychiatry to consider inpatient stabilization.  Otherwise the patient would benefit from outpatient psychiatric follow-up for medication management with a psychotherapy/counseling component to assist her in optimizing her coping skills.    Current Medications:     Current Facility-Administered Medications   Medication Dose Route Frequency Provider Last Rate    acetaminophen  650 mg Oral Q6H PRN Kristine Justin PA-C      aspirin  81 mg Oral Daily Kristine Justin PA-C      vitamin B-12  1,000 mcg Oral Daily Kristine Justin PA-C      Diclofenac Sodium  2 g Topical 4x Daily Kristine Justin PA-C      heparin (porcine)  5,000 Units Subcutaneous Q8H LETTY Kristine Justin PA-C      loratadine  10 mg Oral Daily Kristine Justin PA-C      magnesium Oxide  400 mg Oral Daily Kristine Justin PA-C      multi-electrolyte  100 mL/hr Intravenous Continuous CONSTANCE AlarconC 100 mL/hr (03/04/24 1014)    nicotine  1 patch Transdermal Daily Kristine Justin PA-C      ondansetron  4 mg Intravenous Q6H PRN Kristine Justin PA-C      pantoprazole  40 mg Oral Early Morning Kristine Justin PA-C      traZODone  50 mg Oral HS PRN Kristine Justin PA-C      venlafaxine  150 mg Oral Daily Kristine Justin PA-C         Risks / Benefits of Treatment:    Risks, benefits, and possible side effects of medications explained to patient and patient verbalizes understanding.      Other treatment modalities recommended as indicated:    None applicable at this time      Inpatient consult to Psychiatry  Consult performed by: Олег Parks MD  Consult ordered by: Kristine Justin PA-C        Physician Requesting Consult: Vijay Diaz MD  Principal Problem:DARIN (acute kidney injury) ([facility])    Reason for Consult: Toxic encephalopathy; hallucinations; stress at home      History of Present Illness      Patient is a 64 y.o. female for whom psychiatry consult is  requested for toxic encephalopathy, hallucinations and stress at home.  The following is documented in the H&P:  Luba Winkler is a 64 y.o. female with a PMH of Chiari malformation status post surgery, hypertension, hyperlipidemia, acid reflux, recent hospitalization for hallucinations/encephalopathy who presents with complaints of not sleeping, not taking medications properly, having auditory and visual hallucinations again for the last few days. Has been unable to sleep and with a headache. Also complains of being dizzy and lightheaded. Also not eating or drinking the last few days because she has been feeling very anxious. No nausea, vomiting, diarrhea, constipation, abdominal pain, CP, SOB, fever, chills. No alcohol usage.      Review of Systems:  Review of Systems   Constitutional:  Positive for appetite change and fatigue. Negative for activity change, chills, fever and unexpected weight change.   HENT: Negative.     Eyes: Negative.    Respiratory:  Negative for cough, chest tightness, shortness of breath and wheezing.    Cardiovascular:  Negative for chest pain, palpitations and leg swelling.   Gastrointestinal:  Negative for abdominal distention, abdominal pain, constipation, diarrhea, nausea and vomiting.   Endocrine: Negative.    Genitourinary: Negative.    Musculoskeletal:  Positive for myalgias. Negative for back pain, gait problem and neck stiffness.   Skin: Negative.    Allergic/Immunologic: Negative.    Neurological:  Positive for dizziness, weakness (generalized), light-headedness and headaches. Negative for seizures, syncope, facial asymmetry and speech difficulty.   Psychiatric/Behavioral:  Positive for agitation and hallucinations. Negative for confusion, self-injury and suicidal ideas. The patient is nervous/anxious.          Past Medical and Surgical History:   Medical History        Past Medical History:   Diagnosis Date    COPD (chronic obstructive pulmonary disease) (HCC)      Diverticulitis       GERD (gastroesophageal reflux disease)      Hypertension      Psychiatric disorder              Surgical History         Past Surgical History:   Procedure Laterality Date    ABDOMINAL SURGERY        CERVICAL SPINE SURGERY         rods placed in c4,c5,c6            Meds/Allergies:          Prior to Admission medications    Medication Sig Start Date End Date Taking? Authorizing Provider   aspirin (Aspirin 81) 81 mg chewable tablet Chew 81 mg       Historical Provider, MD   atenolol (TENORMIN) 50 mg tablet Take 50 mg by mouth daily at bedtime        Historical Provider, MD   Diclofenac Sodium (VOLTAREN) 1 % Apply 2 g topically 4 (four) times a day 2/26/24     Avani Menon MD   furosemide (Lasix) 40 mg tablet Take 1 tablet (40 mg total) by mouth daily 2/26/24     Avani Menon MD   lisinopril (ZESTRIL) 20 mg tablet Take 20 mg by mouth daily        Historical Provider, MD   loratadine (CLARITIN) 10 mg tablet Take 1 tablet (10 mg total) by mouth daily 2/27/24     Avani Menon MD   magnesium oxide (MAG-OX) 400 mg tablet Take 1 tablet (400 mg total) by mouth daily Do not start before February 27, 2024. 2/27/24     Avani Menon MD   Multiple Vitamins-Minerals (CENTRUM SILVER 50+MEN PO) Take 1 tablet by mouth       Historical Provider, MD   omeprazole (PriLOSEC) 20 mg delayed release capsule Take 20 mg by mouth daily       Historical Provider, MD   simvastatin (ZOCOR) 20 mg tablet Take 20 mg by mouth daily at bedtime       Historical Provider, MD   traZODone (DESYREL) 50 mg tablet Take 1 tablet (50 mg total) by mouth daily at bedtime as needed for sleep 2/26/24     Avani Menon MD   venlafaxine (EFFEXOR-XR) 150 mg 24 hr capsule Take 150 mg by mouth daily        Historical Provider, MD   vitamin B-12 (VITAMIN B-12) 1,000 mcg tablet Take by mouth daily       Historical Provider, MD      I have reviewed home medications with patient personally.     Allergies:         Allergies   Allergen Reactions     Avelox [Moxifloxacin]      Celexa [Citalopram]      Dilaudid [Hydromorphone]      Dust Mite Extract Sneezing    Fentanyl Other (See Comments)    Penicillins         psych    Prednisone      Topiramate Nausea Only         Social History:  Marital Status: /Civil Union   Occupation:   Patient Pre-hospital Living Situation: Home  Patient Pre-hospital Level of Mobility: unable to be assessed at time of evaluation  Patient Pre-hospital Diet Restrictions:   Substance Use History:   Social History          Substance and Sexual Activity   Alcohol Use Never      Social History           Tobacco Use   Smoking Status Every Day    Current packs/day: 1.00    Types: Cigarettes   Smokeless Tobacco Never      Social History          Substance and Sexual Activity   Drug Use Never         Family History:  Family History[]Expand by Default   History reviewed. No pertinent family history.     Nursing reports the patient has been oriented x 4 but has been a somewhat odd interactions.  One of the patient's friends called in to report the same.    In meeting with the patient she is very circumstantial and tangential therefore serving as a poor historian.  She reports she has been experiencing visual hallucinations he gave the example that she sees birds or the towels moving like a [place] or a fake monkey crossing his eyes back-and-forth.  She states she also hears noises that are not present.  She is vague about this.  She is vague about how long this has been happening but indicates this has been off and on perhaps for a while.  She complains of feeling very depressed regarding home stressors.  It seems there may be conflict with her  at this time but it was difficult to fully understand the situation and she was very tangential and circumstantial.  She reports she previously saw a psychiatrist at the [facility] but it was not clear on what this had been prompted by or what her diagnosis was.  She reports she is   and has stepchildren.  She denies any family psychiatric history.  She smokes 1 pack of cigarettes per day.    [place] suicide severity scale: The patient reports years ago she experienced auditory hallucinations telling her to jump out of the car but no command hallucinations recently.  She denies any history of death wishes or suicidal ideation.  No acute suicide risk identified.    Mental status examination: The patient is alert and well oriented all spheres.  Speech is pressured.  She is circumstantial tangential.  She appears depressed and at times was on the verge of tears.  She was oriented in all spheres.  She likely has been of average intellectual functioning by use vocabulary, sentence structure and syntax.  She denies suicidal homicidal ideation.  She admits to auditory visual hallucinations as above.  She complains that she has not slept any the last 3 days.  She reports diminished appetite.  She has been experiencing anhedonia.  Insight and judgment are intact to the extent that she recognizes the need for psychiatric treatment.    Past Medical History:   Diagnosis Date    COPD (chronic obstructive pulmonary disease) (HCC)     Diverticulitis     GERD (gastroesophageal reflux disease)     Hypertension     Psychiatric disorder        Medical Review Of Systems:    Review of Systems    Meds/Allergies     all current active meds have been reviewed  Allergies   Allergen Reactions    Avelox [Moxifloxacin]     Celexa [Citalopram]     Dilaudid [Hydromorphone]     Dust Mite Extract Sneezing    Fentanyl Other (See Comments)    Penicillins      psych    Prednisone     Topiramate Nausea Only       Objective     Vital signs in last 24 hours:  Temp:  [97.3 °F (36.3 °C)-97.9 °F (36.6 °C)] 97.9 °F (36.6 °C)  HR:  [52-68] 67  Resp:  [10-21] 17  BP: ()/(36-72) 102/49      Intake/Output Summary (Last 24 hours) at 3/4/2024 1100  Last data filed at 3/4/2024 0457  Gross per 24 hour   Intake 5150 ml   Output 1024 ml    Net 4126 ml         Lab Results: I have personally reviewed all pertinent laboratory/tests results.         Imaging Studies: CT head without contrast    Result Date: 3/3/2024  Narrative: CT BRAIN - WITHOUT CONTRAST INDICATION:   Weakness, dizziness. COMPARISON: Head CT 2/26/2024. TECHNIQUE:  CT examination of the brain was performed.  Multiplanar 2D reformatted images were created from the source data. Radiation dose length product (DLP) for this visit:  888 mGy-cm .  This examination, like all CT scans performed in the [facility], was performed utilizing techniques to minimize radiation dose exposure, including the use of iterative reconstruction and automated exposure control. IMAGE QUALITY:  Diagnostic. FINDINGS: PARENCHYMA:  No intracranial mass, mass effect or midline shift. No CT signs of acute infarction.  No acute parenchymal hemorrhage. VENTRICLES AND EXTRA-AXIAL SPACES:  Normal for the patient's age. VISUALIZED ORBITS: Normal visualized orbits. PARANASAL SINUSES: Normal visualized paranasal sinuses. CALVARIUM AND EXTRACRANIAL SOFT TISSUES:  Normal.     Impression: No acute intracranial CT abnormality. No significant interval change. Workstation performed: [id number]     CT spine cervical wo contrast    Result Date: 2/26/2024  Narrative: CT CERVICAL SPINE - WITHOUT CONTRAST INDICATION:   fall r/o fracture. COMPARISON:  None. TECHNIQUE:  CT examination of the cervical spine was performed without intravenous contrast.  Contiguous axial images were obtained. Multiplanar 2D reformatted images were created from the source data. Radiation dose length product (DLP) for this visit:  443.39 mGy-cm .  This examination, like all CT scans performed in the [facility], was performed utilizing techniques to minimize radiation dose exposure, including the use of iterative  reconstruction and automated exposure control. IMAGE QUALITY:  Diagnostic. FINDINGS: ALIGNMENT:  Normal alignment of  the cervical spine. No subluxation. VERTEBRAE:  No fracture. Anterior cervical fusion seen extending from C4-6 Hardware is intact. Robust spondylotic changes seen at C3-4, DEGENERATIVE CHANGES: C2-3 level appears unremarkable with uncovertebral spurring with no significant central canal or neural foraminal C3-4 demonstrates uncovertebral spurring with severe left foraminal. There is moderate right foraminal C4-5 demonstrates anterior cervical fusion with interbody fusion. There is uncovertebral spurring with severe right and severe left foraminal narrowing there is mild central canal narrowing C5-6 demonstrates ridging with uncovertebral spurring with moderate right, severe left foraminal narrowing. There is mild central canal narrowing C6/7 demonstrates ridging with mild left and no significant right foraminal narrowing C7-T1 level appears unremarkable PREVERTEBRAL AND PARASPINAL SOFT TISSUES: Unremarkable THORACIC INLET:  Normal.     Impression: No acute compression collapse of the vertebra Anterior cervical fusion with interbody fusion at C4-5 and C5-6 Workstation performed: [id number]     CT head wo contrast    Result Date: 2/26/2024  Narrative: CT BRAIN - WITHOUT CONTRAST INDICATION:   fall. COMPARISON:  None. TECHNIQUE:  CT examination of the brain was performed.  Multiplanar 2D reformatted images were created from the source data. Radiation dose length product (DLP) for this visit:  828.78 mGy-cm .  This examination, like all CT scans performed in the [facility], was performed utilizing techniques to minimize radiation dose exposure, including the use of iterative  reconstruction and automated exposure control. IMAGE QUALITY:  Diagnostic. FINDINGS: PARENCHYMA:  No intracranial mass, mass effect or midline shift. No CT signs of acute infarction.  No acute parenchymal hemorrhage. Mild periventricular and white matter hypodensity seen related to chronic small vessel ischemic changes  VENTRICLES AND EXTRA-AXIAL SPACES:  Normal for the patient's age. VISUALIZED ORBITS: Normal visualized orbits. PARANASAL SINUSES: Normal visualized paranasal sinuses. CALVARIUM AND EXTRACRANIAL SOFT TISSUES:  Normal.     Impression: No acute intracranial hemorrhage seen. No mass effect or midline shift seen Workstation performed: [id number]     XR chest pa & lateral    Result Date: 2/25/2024  Narrative: XR CHEST PA & LATERAL INDICATION: cough. COMPARISON: None FINDINGS: Clear lungs. No pneumothorax or pleural effusion. Normal cardiomediastinal silhouette. Bones are unremarkable for age. Normal upper abdomen.     Impression: No acute cardiopulmonary disease. Workstation performed: [id number]     CT chest without contrast    Result Date: 2/24/2024  Narrative: CT CHEST WITHOUT IV CONTRAST INDICATION: questionable bibasilar infiltrates. COMPARISON: 11/22/2020. TECHNIQUE: CT examination of the chest was performed without intravenous contrast. Multiplanar 2D reformatted images were created from the source data. This examination, like all CT scans performed in the [facility], was performed utilizing techniques to minimize radiation dose exposure, including the use of iterative reconstruction and automated exposure control. Radiation dose length product (DLP) for this visit: 759 mGy-cm FINDINGS: LUNGS: Central airways are patent. There is no tracheal or endobronchial lesion. There is extensive bibasilar atelectasis. Areas of linear parenchymal scarring/fibrosis in the lung bases are again noted. No lobar airspace consolidation or suspicious pulmonary parenchymal mass. PLEURA: Unremarkable without pneumothorax or pleural effusions. HEART/GREAT VESSELS: Heart is unremarkable for patient's age. No pericardial effusion. No thoracic aortic aneurysm or intramural hematoma. Mild calcific atherosclerosis of the aortic arch region is again seen. MEDIASTINUM AND INDERJIT: Unremarkable without mass or lymphadenopathy by  size criteria. A few nonspecific subcentimeter mediastinal lymph nodes are seen. Visualized thyroid glands are grossly unremarkable. Esophagus is normal in course and caliber. No significant prevertebral soft tissue swelling. CHEST WALL AND LOWER NECK: Unremarkable. VISUALIZED STRUCTURES IN THE UPPER ABDOMEN: Unremarkable. OSSEOUS STRUCTURES: No acute fracture or destructive osseous lesion. Moderate multilevel degenerative changes throughout the thoracic spine. Lower cervical spinal fusion hardware partially visualized.     Impression: Extensive bibasilar dependent atelectasis and areas of linear parenchymal scarring/fibrosis. No lobar airspace consolidation to suggest pneumonia. No suspicious pulmonary parenchymal mass. No mediastinal or hilar lymphadenopathy by size criteria. Workstation performed: [id number]     EKG/Pathology/Other Studies:   Lab Results   Component Value Date    VENTRATE 58 03/03/2024    ATRIALRATE 58 03/03/2024    PRINT 158 03/03/2024    QRSDINT 88 03/03/2024    QTINT 492 03/03/2024    QTCINT 482 03/03/2024    PAXIS 56 03/03/2024    QRSAXIS 39 03/03/2024    TWAVEAXIS 49 03/03/2024        Code Status: Level 1 - Full Code  Advance Directive and Living Will:      Power of :    POLST:      Screenings:    1. Nutrition Screening  Not available on chart    2. Pain Screening  Not available on chart    3. Suicide Screening  Not available on chart    Counseling / Coordination of Care:    Total floor / unit time spent today 30 minutes. Greater than 50% of total time was spent with the patient and / or family counseling and / or coordination of care. A description of the counseling / coordination of care: Chart review, patient evaluation, coordination communication with staff, nursing and provider.

## 2024-03-04 NOTE — ASSESSMENT & PLAN NOTE
Mildly low sodium at 134 on admission, during previous admission with hyponatremia as well.   Will start on IV fluid rehydration and hold Lasix.  During prior stay, Lasix was decreased to 40 mg daily

## 2024-03-04 NOTE — ASSESSMENT & PLAN NOTE
Admitted recently for same.  Since being discharged in the hospital, patient has been anxious and unable to sleep.  Has not been eating or drinking.  Has been having excessive stress.  CK on admission is 665 > 548  Started on IV fluid rehydration and trend

## 2024-03-05 PROBLEM — N17.9 AKI (ACUTE KIDNEY INJURY) (HCC): Status: RESOLVED | Noted: 2024-03-03 | Resolved: 2024-03-05

## 2024-03-05 PROBLEM — E87.1 HYPONATREMIA: Status: RESOLVED | Noted: 2024-02-24 | Resolved: 2024-03-05

## 2024-03-05 LAB
ANION GAP SERPL CALCULATED.3IONS-SCNC: 5 MMOL/L
BUN SERPL-MCNC: 12 MG/DL (ref 5–25)
CALCIUM SERPL-MCNC: 8.9 MG/DL (ref 8.4–10.2)
CHLORIDE SERPL-SCNC: 109 MMOL/L (ref 96–108)
CK SERPL-CCNC: 294 U/L (ref 26–192)
CO2 SERPL-SCNC: 28 MMOL/L (ref 21–32)
CREAT SERPL-MCNC: 0.77 MG/DL (ref 0.6–1.3)
ERYTHROCYTE [DISTWIDTH] IN BLOOD BY AUTOMATED COUNT: 14.9 % (ref 11.6–15.1)
GFR SERPL CREATININE-BSD FRML MDRD: 80 ML/MIN/1.73SQ M
GLUCOSE SERPL-MCNC: 100 MG/DL (ref 65–140)
HCT VFR BLD AUTO: 32.8 % (ref 34.8–46.1)
HGB BLD-MCNC: 10.5 G/DL (ref 11.5–15.4)
MAGNESIUM SERPL-MCNC: 1.9 MG/DL (ref 1.9–2.7)
MCH RBC QN AUTO: 29.6 PG (ref 26.8–34.3)
MCHC RBC AUTO-ENTMCNC: 32 G/DL (ref 31.4–37.4)
MCV RBC AUTO: 92 FL (ref 82–98)
PLATELET # BLD AUTO: 255 THOUSANDS/UL (ref 149–390)
PMV BLD AUTO: 9.5 FL (ref 8.9–12.7)
POTASSIUM SERPL-SCNC: 3.8 MMOL/L (ref 3.5–5.3)
RBC # BLD AUTO: 3.55 MILLION/UL (ref 3.81–5.12)
SODIUM SERPL-SCNC: 142 MMOL/L (ref 135–147)
TREPONEMA PALLIDUM IGG+IGM AB [PRESENCE] IN SERUM OR PLASMA BY IMMUNOASSAY: NORMAL
WBC # BLD AUTO: 5.43 THOUSAND/UL (ref 4.31–10.16)

## 2024-03-05 PROCEDURE — 83735 ASSAY OF MAGNESIUM: CPT

## 2024-03-05 PROCEDURE — 82747 ASSAY OF FOLIC ACID RBC: CPT

## 2024-03-05 PROCEDURE — 82550 ASSAY OF CK (CPK): CPT

## 2024-03-05 PROCEDURE — 86780 TREPONEMA PALLIDUM: CPT

## 2024-03-05 PROCEDURE — 99232 SBSQ HOSP IP/OBS MODERATE 35: CPT

## 2024-03-05 PROCEDURE — 80048 BASIC METABOLIC PNL TOTAL CA: CPT

## 2024-03-05 PROCEDURE — 85027 COMPLETE CBC AUTOMATED: CPT

## 2024-03-05 RX ORDER — CALCIUM CARBONATE 500 MG/1
500 TABLET, CHEWABLE ORAL DAILY PRN
Status: DISCONTINUED | OUTPATIENT
Start: 2024-03-05 | End: 2024-03-06 | Stop reason: HOSPADM

## 2024-03-05 RX ORDER — FAMOTIDINE 20 MG/1
20 TABLET, FILM COATED ORAL 2 TIMES DAILY
Status: DISCONTINUED | OUTPATIENT
Start: 2024-03-05 | End: 2024-03-06 | Stop reason: HOSPADM

## 2024-03-05 RX ADMIN — TRAZODONE HYDROCHLORIDE 50 MG: 50 TABLET ORAL at 21:41

## 2024-03-05 RX ADMIN — QUETIAPINE FUMARATE 25 MG: 25 TABLET ORAL at 21:40

## 2024-03-05 RX ADMIN — Medication 2 G: at 18:18

## 2024-03-05 RX ADMIN — SODIUM CHLORIDE, SODIUM GLUCONATE, SODIUM ACETATE, POTASSIUM CHLORIDE, MAGNESIUM CHLORIDE, SODIUM PHOSPHATE, DIBASIC, AND POTASSIUM PHOSPHATE 100 ML/HR: .53; .5; .37; .037; .03; .012; .00082 INJECTION, SOLUTION INTRAVENOUS at 00:31

## 2024-03-05 RX ADMIN — FAMOTIDINE 20 MG: 20 TABLET, FILM COATED ORAL at 21:41

## 2024-03-05 RX ADMIN — Medication 2 G: at 13:51

## 2024-03-05 RX ADMIN — HEPARIN SODIUM 5000 UNITS: 5000 INJECTION INTRAVENOUS; SUBCUTANEOUS at 21:41

## 2024-03-05 RX ADMIN — LORATADINE 10 MG: 10 TABLET ORAL at 09:40

## 2024-03-05 RX ADMIN — VENLAFAXINE HYDROCHLORIDE 150 MG: 150 CAPSULE, EXTENDED RELEASE ORAL at 09:40

## 2024-03-05 RX ADMIN — HEPARIN SODIUM 5000 UNITS: 5000 INJECTION INTRAVENOUS; SUBCUTANEOUS at 05:02

## 2024-03-05 RX ADMIN — ASPIRIN 81 MG 81 MG: 81 TABLET ORAL at 09:40

## 2024-03-05 RX ADMIN — Medication 400 MG: at 09:40

## 2024-03-05 RX ADMIN — Medication 2 G: at 09:41

## 2024-03-05 RX ADMIN — Medication 2 G: at 21:44

## 2024-03-05 RX ADMIN — CYANOCOBALAMIN TAB 500 MCG 1000 MCG: 500 TAB at 09:40

## 2024-03-05 RX ADMIN — PANTOPRAZOLE SODIUM 40 MG: 40 TABLET, DELAYED RELEASE ORAL at 05:02

## 2024-03-05 RX ADMIN — NICOTINE 1 PATCH: 14 PATCH, EXTENDED RELEASE TRANSDERMAL at 09:41

## 2024-03-05 RX ADMIN — HEPARIN SODIUM 5000 UNITS: 5000 INJECTION INTRAVENOUS; SUBCUTANEOUS at 13:50

## 2024-03-05 NOTE — ASSESSMENT & PLAN NOTE
On admission, patient was agitated, anxious, having hallucinations again. Was given benadryl, ativan, and droperidol which subsequently dropped her BP. Still with somnolence and lethargy, and hallucinations/Toxic metabolic encephalopathy suspect again secondary to sleep deprivation, a/e/b somnolence, lethargy and hallucinations, treated previously with discontinuing Xanax and starting Trazodone.   Previously trazodone helped patient and she slept, no further hallucinations. Will continue with trazodone and also melatonin, but consult psych as well  CT head without any acute intracranial abnormalities.   Answering questions appropriately at this time, but very lethargic likely secondary to meds given in ED.   Hallucinations improved, but still not at baseline per patient and friend.

## 2024-03-05 NOTE — ASSESSMENT & PLAN NOTE
Admitted recently for same.  Since being discharged in the hospital, patient has been anxious and unable to sleep.  Has not been eating or drinking.  Has been having excessive stress.  CK on admission is 665 > 548  Started on IV fluid rehydration and trend, continues to trend down, stop IVF and monitor CK

## 2024-03-05 NOTE — PLAN OF CARE

## 2024-03-05 NOTE — ASSESSMENT & PLAN NOTE
Creatine on admission 2.07, Baseline creatinine 0.7-0.8  Suspect prerenal secondary to dehydration  Hold ACE/ARB and diuretic therapy  Avoid hypotension, nephrotoxins, and NSAIDS if possible    IV fluids   Strict I & Os  Urinary retention protocol and bladder scan   Creatinine improved, continue to trend - creatinine normalized and resolved    Lab Results   Component Value Date    CREATININE 0.77 03/05/2024    CREATININE 1.00 03/04/2024

## 2024-03-05 NOTE — ASSESSMENT & PLAN NOTE
Recently admitted to hospital for same. Unable to sleep again and anxious. Dizzy and lightheaded. Trazodone not helping her sleep. Is having more visual and auditory hallucinations at home, but has none in the hospital. Not eating or drinking.  CT head without any acute intracranial abnormalities  Discussed with patient regarding medication changes and still having hallucinations about consulting psych, and patient agreeable to talk with them at this time.   Psych: seroquel 25 mg nightly in adjunct to venlafaxine, can be further titrated to effect if indicated as tolerated. Check vitamin D, TSH, RBC folate, RPR. If significant improvement of mental status not observed before discharge, reconsult psych. Would benefit from outpatient psychiatric follow-up for medication management with a psychotherapy/counseling component to assist her in optimizing her coping skills  Hallucinations improved, but patient still with some confusion

## 2024-03-05 NOTE — ASSESSMENT & PLAN NOTE
Had procedure done at [facility] she states and it was a long time ago.  Do not have any records at this time.  She states she is on Effexor for this to help with serotonin

## 2024-03-05 NOTE — PLAN OF CARE

## 2024-03-05 NOTE — PROGRESS NOTES
[facility]  Progress Note  Name: Luba Winkler I  MRN: [id number]  Unit/Bed#: -01 I Date of Admission: 3/3/2024   Date of Service: 3/5/2024 I Hospital Day: 2    Assessment/Plan   * Toxic encephalopathy  Assessment & Plan  On admission, patient was agitated, anxious, having hallucinations again. Was given benadryl, ativan, and droperidol which subsequently dropped her BP. Still with somnolence and lethargy, and hallucinations/Toxic metabolic encephalopathy suspect again secondary to sleep deprivation, a/e/b somnolence, lethargy and hallucinations, treated previously with discontinuing Xanax and starting Trazodone.   Previously trazodone helped patient and she slept, no further hallucinations. Will continue with trazodone and also melatonin, but consult psych as well  CT head without any acute intracranial abnormalities.   Answering questions appropriately at this time, but very lethargic likely secondary to meds given in ED.   Hallucinations improved, but still not at baseline per patient and friend.     Non-traumatic rhabdomyolysis  Assessment & Plan  Admitted recently for same.  Since being discharged in the hospital, patient has been anxious and unable to sleep.  Has not been eating or drinking.  Has been having excessive stress.  CK on admission is 665 > 548  Started on IV fluid rehydration and trend, continues to trend down, stop IVF and monitor CK    Hallucinations  Assessment & Plan  Recently admitted to hospital for same. Unable to sleep again and anxious. Dizzy and lightheaded. Trazodone not helping her sleep. Is having more visual and auditory hallucinations at home, but has none in the hospital. Not eating or drinking.  CT head without any acute intracranial abnormalities  Discussed with patient regarding medication changes and still having hallucinations about consulting psych, and patient agreeable to talk with them at this time.   Psych: seroquel 25 mg nightly in  adjunct to venlafaxine, can be further titrated to effect if indicated as tolerated. Check vitamin D, TSH, RBC folate, RPR. If significant improvement of mental status not observed before discharge, reconsult psych. Would benefit from outpatient psychiatric follow-up for medication management with a psychotherapy/counseling component to assist her in optimizing her coping skills  Hallucinations improved, but patient still with some confusion    Essential hypertension  Assessment & Plan  Currently taking lisinopril 20, atenolol 50, Lasix 40.  Blood pressure initially elevated in the emergency department as patient has not been taking meds at home.  Was given Ativan, droperidol, Benadryl to calm her down in the ED however became hypotensive after this.  Critical care evaluated at bedside and said stable for floor, BP improves when you wake patient.   Holding all blood pressure medications at this time and continue on IV fluid rehydration.  Hold lisinopril in light of DARIN.  Resume when able  Avoid hypotension    Stress at home  Assessment & Plan  Patient has a lot of stress at home, she takes care of her  who has PLS (primary lateral sclerosis)    Myalgia  Assessment & Plan  Secondary to rhabdo.  Still with complaints of pain in the shoulder and neck area.  Continue Voltaren gel    History of Chiari malformation  Assessment & Plan  Had procedure done at [facility] she states and it was a long time ago.  Do not have any records at this time.  She states she is on Effexor for this to help with serotonin    Gastroesophageal reflux disease without esophagitis  Assessment & Plan  Continue PPI    DARIN (acute kidney injury) (HCC)-resolved as of 3/5/2024  Assessment & Plan  Creatine on admission 2.07, Baseline creatinine 0.7-0.8  Suspect prerenal secondary to dehydration  Hold ACE/ARB and diuretic therapy  Avoid hypotension, nephrotoxins, and NSAIDS if possible    IV fluids   Strict I & Os  Urinary retention protocol  and bladder scan   Creatinine improved, continue to trend - creatinine normalized and resolved    Lab Results   Component Value Date    CREATININE 0.77 03/05/2024    CREATININE 1.00 03/04/2024       Hyponatremia-resolved as of 3/5/2024  Assessment & Plan  Mildly low sodium at 134 on admission, during previous admission with hyponatremia as well.   Will start on IV fluid rehydration and hold Lasix.  During prior stay, Lasix was decreased to 40 mg daily         VTE Pharmacologic Prophylaxis: VTE Score: 3 Moderate Risk (Score 3-4) - Pharmacological DVT Prophylaxis Ordered: heparin.    Mobility:   Basic Mobility Inpatient Raw Score: 23  JH-HLM Goal: 7: Walk 25 feet or more  JH-HLM Achieved: 7: Walk 25 feet or more  HLM Goal achieved. Continue to encourage appropriate mobility.    Patient Centered Rounds: I performed bedside rounds with nursing staff today.   Discussions with Specialists or Other Care Team Provider: nursing, case management    Education and Discussions with Family / Patient: Updated  (friend) at bedside.    Total Time Spent on Date of Encounter in care of patient: 40 mins. This time was spent on one or more of the following: performing physical exam; counseling and coordination of care; obtaining or reviewing history; documenting in the medical record; reviewing/ordering tests, medications or procedures; communicating with other healthcare professionals and discussing with patient's family/caregivers.    Current Length of Stay: 2 day(s)  Current Patient Status: Inpatient   Certification Statement: The patient will continue to require additional inpatient hospital stay due to monitoring mental status, improvement of CK level  Discharge Plan: Anticipate discharge in 24-48 hrs to discharge locations pending improvement of mental status and further psych recs    Code Status: Level 1 - Full Code    Subjective:   Patient seen and examined today. She is feeling much better, but still not herself,  still feeling confused. No longer having hallucinations at this time, last one was of the swans in the window made from the towels. She is eating and drinking well. No nausea, vomiting, diarrhea, constipation, abdominal pain, CP, SOB, fever, chills.     Objective:     Vitals:   Temp (24hrs), Av.8 °F (36.6 °C), Min:97.7 °F (36.5 °C), Max:97.9 °F (36.6 °C)    Temp:  [97.7 °F (36.5 °C)-97.9 °F (36.6 °C)] 97.7 °F (36.5 °C)  HR:  [70-72] 72  Resp:  [18-19] 18  BP: (107-132)/(60-78) 132/78  SpO2:  [89 %-93 %] 93 %  Body mass index is 39.21 kg/m².     Input and Output Summary (last 24 hours):     Intake/Output Summary (Last 24 hours) at 3/5/2024 1401  Last data filed at 3/4/2024 1801  Gross per 24 hour   Intake 1223 ml   Output 800 ml   Net 423 ml       Physical Exam:   Physical Exam  Vitals reviewed.   Constitutional:       General: She is not in acute distress.     Appearance: She is obese. She is not ill-appearing or toxic-appearing.   HENT:      Head: Normocephalic and atraumatic.      Mouth/Throat:      Mouth: Mucous membranes are moist.   Cardiovascular:      Rate and Rhythm: Normal rate and regular rhythm.      Heart sounds: No murmur heard.  Pulmonary:      Effort: No respiratory distress.      Breath sounds: No stridor. No wheezing.   Abdominal:      General: Bowel sounds are normal. There is no distension.      Palpations: Abdomen is soft. There is no mass.      Tenderness: There is no abdominal tenderness.   Musculoskeletal:      Right lower leg: No edema.      Left lower leg: No edema.   Skin:     General: Skin is warm and dry.   Neurological:      General: No focal deficit present.      Mental Status: She is alert and oriented to person, place, and time.      Comments: Still having some confusion but answering questions appropriately   Psychiatric:         Mood and Affect: Mood normal.         Behavior: Behavior normal.          Additional Data:     Labs:  Results from last 7 days   Lab Units  03/05/24  0504 03/04/24  0516   WBC Thousand/uL 5.43 7.03   HEMOGLOBIN g/dL 10.5* 10.9*   HEMATOCRIT % 32.8* 34.0*   PLATELETS Thousands/uL 255 252   NEUTROS PCT %  --  69   LYMPHS PCT %  --  22   MONOS PCT %  --  5   EOS PCT %  --  4     Results from last 7 days   Lab Units 03/05/24  0504 03/04/24  0516   SODIUM mmol/L 142 141   POTASSIUM mmol/L 3.8 3.6   CHLORIDE mmol/L 109* 108   CO2 mmol/L 28 25   BUN mg/dL 12 22   CREATININE mg/dL 0.77 1.00   ANION GAP mmol/L 5 8   CALCIUM mg/dL 8.9 8.6   ALBUMIN g/dL  --  3.5   TOTAL BILIRUBIN mg/dL  --  0.88   ALK PHOS U/L  --  63   ALT U/L  --  31   AST U/L  --  50*   GLUCOSE RANDOM mg/dL 100 116     Results from last 7 days   Lab Units 03/03/24  1049   INR  1.25*             Results from last 7 days   Lab Units 03/03/24  1049   LACTIC ACID mmol/L 1.3       Lines/Drains:  Invasive Devices       Peripheral Intravenous Line  Duration             Peripheral IV 03/03/24 Left Antecubital 2 days    Peripheral IV 03/04/24 Left;Ventral (anterior) Forearm <1 day                          Imaging: No pertinent imaging reviewed.    Recent Cultures (last 7 days):         Last 24 Hours Medication List:   Current Facility-Administered Medications   Medication Dose Route Frequency Provider Last Rate    acetaminophen  650 mg Oral Q6H PRN Kristien Justin, PA-C      aspirin  81 mg Oral Daily Kristine Justin, PA-C      vitamin B-12  1,000 mcg Oral Daily Kristine Justin, PA-C      Diclofenac Sodium  2 g Topical 4x Daily Kristine Justin, PA-C      heparin (porcine)  5,000 Units Subcutaneous Q8H LETTY Kristine Justin, PA-C      loratadine  10 mg Oral Daily Kristine Justin, PA-C      magnesium Oxide  400 mg Oral Daily Kristine Justin, PA-C      nicotine  1 patch Transdermal Daily Kristine Justin, PA-C      ondansetron  4 mg Intravenous Q6H PRN Kristine Justin, PA-C      pantoprazole  40 mg Oral Early Morning VEE Alarcon-KARUNA      QUEtiapine  25 mg Oral HS Kristine  Hu Justin PA-C      traZODone  50 mg Oral HS PRN Kristine Justin PA-C      venlafaxine  150 mg Oral Daily Kristine Justin PA-C          Today, Patient Was Seen By: Kristine Justin PA-C    **Please Note: This note may have been constructed using a voice recognition system.**

## 2024-03-06 ENCOUNTER — TELEPHONE (OUTPATIENT)
Dept: PSYCHIATRY | Facility: CLINIC | Age: 64
End: 2024-03-06

## 2024-03-06 VITALS
HEIGHT: 65 IN | BODY MASS INDEX: 39.19 KG/M2 | HEART RATE: 81 BPM | OXYGEN SATURATION: 95 % | SYSTOLIC BLOOD PRESSURE: 151 MMHG | RESPIRATION RATE: 17 BRPM | WEIGHT: 235.23 LBS | DIASTOLIC BLOOD PRESSURE: 87 MMHG | TEMPERATURE: 97.7 F

## 2024-03-06 PROBLEM — M62.82 NON-TRAUMATIC RHABDOMYOLYSIS: Status: RESOLVED | Noted: 2024-02-24 | Resolved: 2024-03-06

## 2024-03-06 LAB
ANION GAP SERPL CALCULATED.3IONS-SCNC: 5 MMOL/L
BUN SERPL-MCNC: 7 MG/DL (ref 5–25)
CALCIUM SERPL-MCNC: 9.2 MG/DL (ref 8.4–10.2)
CHLORIDE SERPL-SCNC: 109 MMOL/L (ref 96–108)
CK SERPL-CCNC: 172 U/L (ref 26–192)
CO2 SERPL-SCNC: 29 MMOL/L (ref 21–32)
CREAT SERPL-MCNC: 0.75 MG/DL (ref 0.6–1.3)
ERYTHROCYTE [DISTWIDTH] IN BLOOD BY AUTOMATED COUNT: 14.9 % (ref 11.6–15.1)
GFR SERPL CREATININE-BSD FRML MDRD: 83 ML/MIN/1.73SQ M
GLUCOSE SERPL-MCNC: 90 MG/DL (ref 65–140)
HCT VFR BLD AUTO: 34.9 % (ref 34.8–46.1)
HGB BLD-MCNC: 11.2 G/DL (ref 11.5–15.4)
MAGNESIUM SERPL-MCNC: 1.8 MG/DL (ref 1.9–2.7)
MCH RBC QN AUTO: 29.4 PG (ref 26.8–34.3)
MCHC RBC AUTO-ENTMCNC: 32.1 G/DL (ref 31.4–37.4)
MCV RBC AUTO: 92 FL (ref 82–98)
PLATELET # BLD AUTO: 260 THOUSANDS/UL (ref 149–390)
PMV BLD AUTO: 9.7 FL (ref 8.9–12.7)
POTASSIUM SERPL-SCNC: 3.8 MMOL/L (ref 3.5–5.3)
RBC # BLD AUTO: 3.81 MILLION/UL (ref 3.81–5.12)
SODIUM SERPL-SCNC: 143 MMOL/L (ref 135–147)
WBC # BLD AUTO: 5.46 THOUSAND/UL (ref 4.31–10.16)

## 2024-03-06 PROCEDURE — 85027 COMPLETE CBC AUTOMATED: CPT

## 2024-03-06 PROCEDURE — 99239 HOSP IP/OBS DSCHRG MGMT >30: CPT

## 2024-03-06 PROCEDURE — G0406 INPT/TELE FOLLOW UP 15: HCPCS | Performed by: PSYCHIATRY & NEUROLOGY

## 2024-03-06 PROCEDURE — 83735 ASSAY OF MAGNESIUM: CPT

## 2024-03-06 PROCEDURE — 80048 BASIC METABOLIC PNL TOTAL CA: CPT

## 2024-03-06 PROCEDURE — 82550 ASSAY OF CK (CPK): CPT

## 2024-03-06 RX ORDER — MAGNESIUM SULFATE HEPTAHYDRATE 40 MG/ML
2 INJECTION, SOLUTION INTRAVENOUS ONCE
Qty: 50 ML | Refills: 0 | Status: COMPLETED | OUTPATIENT
Start: 2024-03-06 | End: 2024-03-06

## 2024-03-06 RX ORDER — QUETIAPINE FUMARATE 25 MG/1
25 TABLET, FILM COATED ORAL
Qty: 30 TABLET | Refills: 0 | Status: SHIPPED | OUTPATIENT
Start: 2024-03-06

## 2024-03-06 RX ADMIN — PANTOPRAZOLE SODIUM 40 MG: 40 TABLET, DELAYED RELEASE ORAL at 05:14

## 2024-03-06 RX ADMIN — CYANOCOBALAMIN TAB 500 MCG 1000 MCG: 500 TAB at 09:33

## 2024-03-06 RX ADMIN — Medication 2 G: at 11:39

## 2024-03-06 RX ADMIN — FAMOTIDINE 20 MG: 20 TABLET, FILM COATED ORAL at 09:34

## 2024-03-06 RX ADMIN — LORATADINE 10 MG: 10 TABLET ORAL at 09:34

## 2024-03-06 RX ADMIN — HEPARIN SODIUM 5000 UNITS: 5000 INJECTION INTRAVENOUS; SUBCUTANEOUS at 05:15

## 2024-03-06 RX ADMIN — MAGNESIUM SULFATE HEPTAHYDRATE 2 G: 40 INJECTION, SOLUTION INTRAVENOUS at 09:46

## 2024-03-06 RX ADMIN — Medication 2 G: at 09:37

## 2024-03-06 RX ADMIN — Medication 400 MG: at 09:34

## 2024-03-06 RX ADMIN — ASPIRIN 81 MG 81 MG: 81 TABLET ORAL at 09:34

## 2024-03-06 RX ADMIN — VENLAFAXINE HYDROCHLORIDE 150 MG: 150 CAPSULE, EXTENDED RELEASE ORAL at 09:34

## 2024-03-06 RX ADMIN — NICOTINE 1 PATCH: 14 PATCH, EXTENDED RELEASE TRANSDERMAL at 09:34

## 2024-03-06 NOTE — ASSESSMENT & PLAN NOTE
Secondary to rhabdo.  Still with complaints of pain in the shoulder and neck area.  Continue Voltaren gel  Myalgias improved

## 2024-03-06 NOTE — DISCHARGE INSTR - AVS FIRST PAGE
Dear Luba Winkler,     It was our pleasure to care for you here at [facility]. For follow up as well as any medication refills, we recommend that you follow up with your primary care physician. Here are the most important instructions/ recommendations at discharge:     Notable Medication Adjustments -   Starting Seroquel nightly  Continue taking venlafaxine  Continue taking trazodone as needed nightly  Important follow up information -   Follow-up with PCP in 1 week  Follow-up with psychiatrist outpatient  Follow-up with psych resources to establish therapy to assist with current life stressors and assist with coping skills  Other Instructions -   Please continue taking medications as prescribed.  Please continue to eat and drink staying well-hydrated.  If you have any new or worsening symptoms please return to emergency department for evaluation.  Please review this entire after visit summary as additional general instructions including medication list, appointments, activity, diet, any pertinent wound care, and other additional recommendations from your care team that may be provided for you.      Sincerely,     Kristine Justin PA-C

## 2024-03-06 NOTE — NURSING NOTE
DC instructions given to and explained to patient with verbal understanding. All belongings at patients side. Neighbor here to transport patient home.

## 2024-03-06 NOTE — ASSESSMENT & PLAN NOTE
Patient has a lot of stress at home, she takes care of her  who has PLS (primary lateral sclerosis)  Information given for outpatient resources for patient.

## 2024-03-06 NOTE — ASSESSMENT & PLAN NOTE
On admission, patient was agitated, anxious, having hallucinations again. Was given benadryl, ativan, and droperidol which subsequently dropped her BP. Still with somnolence and lethargy, and hallucinations/Toxic metabolic encephalopathy suspect again secondary to sleep deprivation, a/e/b somnolence, lethargy and hallucinations, treated previously with discontinuing Xanax and starting Trazodone.   Previously trazodone helped patient and she slept, no further hallucinations. Will continue with trazodone and also melatonin, but consult psych as well  CT head without any acute intracranial abnormalities.   Answering questions appropriately at this time, but very lethargic likely secondary to meds given in ED.   Hallucinations resolved, patient answering questions appropriate.  Friend stating that patient back to baseline.

## 2024-03-06 NOTE — ASSESSMENT & PLAN NOTE
Currently taking lisinopril 20, atenolol 50, Lasix 40.  Blood pressure initially elevated in the emergency department as patient has not been taking meds at home.  Was given Ativan, droperidol, Benadryl to calm her down in the ED however became hypotensive after this.  Critical care evaluated at bedside and said stable for floor, BP improves when you wake patient.   Blood pressure stabilized, can resume home medications on discharge and monitor blood pressure.

## 2024-03-06 NOTE — DISCHARGE INSTR - OTHER ORDERS
Mental Health Providers          [facility] [phone]   [facility] [phone]   [facility] [phone]   [place] [phone]  [facility] [phone]  [facility]) [phone]  [facility] [phone]  [facility] [phone]  [facility] [phone]    Mental [facility] Case Management Support  Address: [address] [place] [address]  Phone [phone]    [facility]  Call: [phone]   Text: [phone]   Website with chat feature: [email or web address]      Services are available for anyone who calls, chats, texts, walks in, or requests a Crisis Specialist to come to their home, school, or community.

## 2024-03-06 NOTE — PLAN OF CARE

## 2024-03-06 NOTE — TELEPHONE ENCOUNTER
Consult placed on [facility] queue at 0823 to be seen by an [facility] psychiatrist.

## 2024-03-06 NOTE — TELEMEDICINE
TeleConsultation - Behavioral Health   Luba Winkler 64 y.o. female MRN: [id number]  Unit/Bed#: -01 Encounter: [id number]        REQUIRED DOCUMENTATION:     1. This service was provided via Telemedicine.  2. [place]  3. TeleMed provider: Олег Parks MD.  4. Identify all parties in room with patient during tele consult:  pt  5.Patient was then informed that this was a Telemedicine visit and that the exam was being conducted confidentially over secure lines. My office door was closed. No one else was in the room.  Patient acknowledged consent and understanding of privacy and security of the Telemedicine visit, and gave us permission to have the assistant stay in the room in order to assist with the history and to conduct the exam.  I informed the patient that I have reviewed their record in Epic and presented the opportunity for them to ask any questions regarding the visit today.  The patient agreed to participate.       Assessment/Plan     Present on Admission:   Essential hypertension   Gastroesophageal reflux disease without esophagitis   (Resolved) Hyponatremia   Non-traumatic rhabdomyolysis   Myalgia   Hallucinations   Toxic encephalopathy    Assessment:    Unspecified mood disorder; unspecified psychosis; toxic metabolic encephalopathy/delirium in setting of sleep deprivation and hyponatremia.  The patient presents is much improved at this time.  She denies any further hallucinations or confusion.  No psychosis or confusion is evident at this time.    Treatment Plan:    Upon discharge the patient would benefit from outpatient psychiatric follow-up to include medication management with a psychotherapy/counseling component to assist her in optimizing her coping skills for dealing with current life stressors.  Continue with Seroquel and as needed trazodone at current dosing.  She is benefiting from the medication well without adverse side effect.  No suicide precautions are indicated.   The patient is asking to meet with case management to provide her with resources for dealing with her current home and marital situation.  The patient would also benefit from meeting with pastoral care for which she is motivated.  Reconsult psychiatry as needed.    Current Medications:     Current Facility-Administered Medications   Medication Dose Route Frequency Provider Last Rate    acetaminophen  650 mg Oral Q6H PRN Kristine Justin PA-C      aspirin  81 mg Oral Daily Kristine Justin PA-C      calcium carbonate  500 mg Oral Daily PRN Vijay Diaz MD      vitamin B-12  1,000 mcg Oral Daily Kristine Justin PA-C      Diclofenac Sodium  2 g Topical 4x Daily Kristine Justin PA-C      famotidine  20 mg Oral BID Vijay Diaz MD      heparin (porcine)  5,000 Units Subcutaneous Q8H LETTY Kristine Justin PA-C      loratadine  10 mg Oral Daily Kristine Justin PA-C      magnesium Oxide  400 mg Oral Daily Kristine Justin PA-C      magnesium sulfate  2 g Intravenous Once Kristine Justin PA-C 2 g (03/06/24 0946)    nicotine  1 patch Transdermal Daily Kristine Justin PA-C      ondansetron  4 mg Intravenous Q6H PRN Kristine Justin PA-C      pantoprazole  40 mg Oral Early Morning Kristine Justin PA-C      QUEtiapine  25 mg Oral HS Kristine Justin PA-C      traZODone  50 mg Oral HS PRN Kristine Justin PA-C      venlafaxine  150 mg Oral Daily Kristine Justin PA-C         Risks / Benefits of Treatment:    Risks, benefits, and possible side effects of medications explained to patient and patient verbalizes understanding.      Other treatment modalities recommended as indicated:    outpatient referral      Inpatient consult to Psychiatry  Consult performed by: Олег Parks MD  Consult ordered by: Kristine Justin PA-C        Physician Requesting Consult: Maris Whitmore MD  Principal Problem:Toxic encephalopathy    Reason for Consult: Follow-up for  hallucinations      History of Present Illness      Patient is a 64 y.o. female for whom psychiatry consult follow-up is requested for hallucinations.  Please see the psychiatry consult on February 4, 2024 for details.  In summary:  Patient is a 64 y.o. female for whom psychiatry consult is requested for toxic encephalopathy, hallucinations and stress at home.  The following is documented in the H&P:  Luba Winkler is a 64 y.o. female with a PMH of Chiari malformation status post surgery, hypertension, hyperlipidemia, acid reflux, recent hospitalization for hallucinations/encephalopathy who presents with complaints of not sleeping, not taking medications properly, having auditory and visual hallucinations again for the last few days. Has been unable to sleep and with a headache. Also complains of being dizzy and lightheaded. Also not eating or drinking the last few days because she has been feeling very anxious. No nausea, vomiting, diarrhea, constipation, abdominal pain, CP, SOB, fever, chills. No alcohol usage.     Interval Hospital course since last consult: The patient was started on Seroquel 25 mg p.o. nightly for psychosis with trazodone 50 mg p.o. nightly as needed insomnia.  Nursing reports that the patient has not exhibited any further signs of hallucinations, psychosis or confusion.  She has been well oriented in all spheres.    In meeting with the patient she is alert and well oriented all spheres.  Affect is bright and euthymic congruent with stated mood.  She states her mood is much improved.  Sensorium is clear.  Speech is unremarkable.  Thought process is logical and linear.  Thought content is reality based.  Associations are tight.  Memory is intact in all spheres.  She denies suicidal homicidal ideation.  She denies any further hallucinations since starting the Seroquel.  She states she has slept well with the trazodone as needed.  She denies any further confusion.  Insight and judgment are intact.   The patient does report feeling somewhat stressed regarding her .  Specifically she states that on Saturday her stepson came by and picked up her  and took him to her home.  She states she does not know how to let him know that she is okay.  She reports she is cognitively impaired and fears that he is not able to understand where she is and that she is okay.    Past Medical History:   Diagnosis Date    COPD (chronic obstructive pulmonary disease) (HCC)     Diverticulitis     GERD (gastroesophageal reflux disease)     Hypertension     Psychiatric disorder        Medical Review Of Systems:    Review of Systems    Meds/Allergies     all current active meds have been reviewed  Allergies   Allergen Reactions    Avelox [Moxifloxacin]     Celexa [Citalopram]     Dilaudid [Hydromorphone]     Dust Mite Extract Sneezing    Fentanyl Other (See Comments)    Penicillins      psych    Prednisone     Topiramate Nausea Only       Objective     Vital signs in last 24 hours:  Temp:  [97.7 °F (36.5 °C)-98.6 °F (37 °C)] 97.7 °F (36.5 °C)  HR:  [78-81] 81  Resp:  [17] 17  BP: (140-151)/(71-87) 151/87      Intake/Output Summary (Last 24 hours) at 3/6/2024 1133  Last data filed at 3/5/2024 1945  Gross per 24 hour   Intake 260 ml   Output --   Net 260 ml         Lab Results: I have personally reviewed all pertinent laboratory/tests results.         Imaging Studies: CT head without contrast    Result Date: 3/3/2024  Narrative: CT BRAIN - WITHOUT CONTRAST INDICATION:   Weakness, dizziness. COMPARISON: Head CT 2/26/2024. TECHNIQUE:  CT examination of the brain was performed.  Multiplanar 2D reformatted images were created from the source data. Radiation dose length product (DLP) for this visit:  888 mGy-cm .  This examination, like all CT scans performed in the [facility], was performed utilizing techniques to minimize radiation dose exposure, including the use of iterative reconstruction and automated exposure  control. IMAGE QUALITY:  Diagnostic. FINDINGS: PARENCHYMA:  No intracranial mass, mass effect or midline shift. No CT signs of acute infarction.  No acute parenchymal hemorrhage. VENTRICLES AND EXTRA-AXIAL SPACES:  Normal for the patient's age. VISUALIZED ORBITS: Normal visualized orbits. PARANASAL SINUSES: Normal visualized paranasal sinuses. CALVARIUM AND EXTRACRANIAL SOFT TISSUES:  Normal.     Impression: No acute intracranial CT abnormality. No significant interval change. Workstation performed: [id number]     CT spine cervical wo contrast    Result Date: 2/26/2024  Narrative: CT CERVICAL SPINE - WITHOUT CONTRAST INDICATION:   fall r/o fracture. COMPARISON:  None. TECHNIQUE:  CT examination of the cervical spine was performed without intravenous contrast.  Contiguous axial images were obtained. Multiplanar 2D reformatted images were created from the source data. Radiation dose length product (DLP) for this visit:  443.39 mGy-cm .  This examination, like all CT scans performed in the [facility], was performed utilizing techniques to minimize radiation dose exposure, including the use of iterative  reconstruction and automated exposure control. IMAGE QUALITY:  Diagnostic. FINDINGS: ALIGNMENT:  Normal alignment of the cervical spine. No subluxation. VERTEBRAE:  No fracture. Anterior cervical fusion seen extending from C4-6 Hardware is intact. Robust spondylotic changes seen at C3-4, DEGENERATIVE CHANGES: C2-3 level appears unremarkable with uncovertebral spurring with no significant central canal or neural foraminal C3-4 demonstrates uncovertebral spurring with severe left foraminal. There is moderate right foraminal C4-5 demonstrates anterior cervical fusion with interbody fusion. There is uncovertebral spurring with severe right and severe left foraminal narrowing there is mild central canal narrowing C5-6 demonstrates ridging with uncovertebral spurring with moderate right, severe left foraminal  narrowing. There is mild central canal narrowing C6/7 demonstrates ridging with mild left and no significant right foraminal narrowing C7-T1 level appears unremarkable PREVERTEBRAL AND PARASPINAL SOFT TISSUES: Unremarkable THORACIC INLET:  Normal.     Impression: No acute compression collapse of the vertebra Anterior cervical fusion with interbody fusion at C4-5 and C5-6 Workstation performed: [id number]     CT head wo contrast    Result Date: 2/26/2024  Narrative: CT BRAIN - WITHOUT CONTRAST INDICATION:   fall. COMPARISON:  None. TECHNIQUE:  CT examination of the brain was performed.  Multiplanar 2D reformatted images were created from the source data. Radiation dose length product (DLP) for this visit:  828.78 mGy-cm .  This examination, like all CT scans performed in the [facility], was performed utilizing techniques to minimize radiation dose exposure, including the use of iterative  reconstruction and automated exposure control. IMAGE QUALITY:  Diagnostic. FINDINGS: PARENCHYMA:  No intracranial mass, mass effect or midline shift. No CT signs of acute infarction.  No acute parenchymal hemorrhage. Mild periventricular and white matter hypodensity seen related to chronic small vessel ischemic changes VENTRICLES AND EXTRA-AXIAL SPACES:  Normal for the patient's age. VISUALIZED ORBITS: Normal visualized orbits. PARANASAL SINUSES: Normal visualized paranasal sinuses. CALVARIUM AND EXTRACRANIAL SOFT TISSUES:  Normal.     Impression: No acute intracranial hemorrhage seen. No mass effect or midline shift seen Workstation performed: [id number]     XR chest pa & lateral    Result Date: 2/25/2024  Narrative: XR CHEST PA & LATERAL INDICATION: cough. COMPARISON: None FINDINGS: Clear lungs. No pneumothorax or pleural effusion. Normal cardiomediastinal silhouette. Bones are unremarkable for age. Normal upper abdomen.     Impression: No acute cardiopulmonary disease. Workstation performed: [id number]      CT chest without contrast    Result Date: 2/24/2024  Narrative: CT CHEST WITHOUT IV CONTRAST INDICATION: questionable bibasilar infiltrates. COMPARISON: 11/22/2020. TECHNIQUE: CT examination of the chest was performed without intravenous contrast. Multiplanar 2D reformatted images were created from the source data. This examination, like all CT scans performed in the [facility], was performed utilizing techniques to minimize radiation dose exposure, including the use of iterative reconstruction and automated exposure control. Radiation dose length product (DLP) for this visit: 759 mGy-cm FINDINGS: LUNGS: Central airways are patent. There is no tracheal or endobronchial lesion. There is extensive bibasilar atelectasis. Areas of linear parenchymal scarring/fibrosis in the lung bases are again noted. No lobar airspace consolidation or suspicious pulmonary parenchymal mass. PLEURA: Unremarkable without pneumothorax or pleural effusions. HEART/GREAT VESSELS: Heart is unremarkable for patient's age. No pericardial effusion. No thoracic aortic aneurysm or intramural hematoma. Mild calcific atherosclerosis of the aortic arch region is again seen. MEDIASTINUM AND INDERJIT: Unremarkable without mass or lymphadenopathy by size criteria. A few nonspecific subcentimeter mediastinal lymph nodes are seen. Visualized thyroid glands are grossly unremarkable. Esophagus is normal in course and caliber. No significant prevertebral soft tissue swelling. CHEST WALL AND LOWER NECK: Unremarkable. VISUALIZED STRUCTURES IN THE UPPER ABDOMEN: Unremarkable. OSSEOUS STRUCTURES: No acute fracture or destructive osseous lesion. Moderate multilevel degenerative changes throughout the thoracic spine. Lower cervical spinal fusion hardware partially visualized.     Impression: Extensive bibasilar dependent atelectasis and areas of linear parenchymal scarring/fibrosis. No lobar airspace consolidation to suggest pneumonia. No  suspicious pulmonary parenchymal mass. No mediastinal or hilar lymphadenopathy by size criteria. Workstation performed: [id number]     EKG/Pathology/Other Studies:   Lab Results   Component Value Date    VENTRATE 58 03/03/2024    ATRIALRATE 58 03/03/2024    PRINT 158 03/03/2024    QRSDINT 88 03/03/2024    QTINT 492 03/03/2024    QTCINT 482 03/03/2024    PAXIS 56 03/03/2024    QRSAXIS 39 03/03/2024    TWAVEAXIS 49 03/03/2024        Code Status: Level 1 - Full Code  Advance Directive and Living Will:      Power of :    POLST:      Screenings:    1. Nutrition Screening  Not available on chart    2. Pain Screening  Not available on chart    3. Suicide Screening  Not available on chart    Counseling / Coordination of Care:    Total floor / unit time spent today 30 minutes. Greater than 50% of total time was spent with the patient and / or family counseling and / or coordination of care. A description of the counseling / coordination of care: Chart review, patient evaluation, coordination communication with staff, nursing and provider.

## 2024-03-06 NOTE — CASE MANAGEMENT
Case Management Progress Note    Patient name Luba Winkler  Location /-01 MRN [id number]  : 1960 Date 3/6/2024       LOS (days): 3  Geometric Mean LOS (GMLOS) (days): 5.1  Days to GMLOS:2.1        OBJECTIVE:        Current admission status: Inpatient  Preferred Pharmacy:   [id number] - [place], [place] - [address] [address]  Phone: [phone] Fax: [phone]    [facility] - [place], [place] - [address]  [address] [place] [address]  Phone: [phone] Fax: [phone]    Primary Care Provider: Jabier Cabrales DO    Primary Insurance: MEDICARE  Secondary Insurance: [employer]    PROGRESS NOTE:    CM appreciates SW input on mental health resources for patient follow up after dc.   Information added to AVS as well as extra copy printed.      Anticipate dc home, OP follow up.   Friend in room who will provide transportation at dc.      IMM reviewed with patient, patient agrees with discharge determination.

## 2024-03-06 NOTE — DISCHARGE SUMMARY
[facility]  Discharge- Luba Winkler 1960, 64 y.o. female MRN: [id number]  Unit/Bed#: -[facility] Encounter: [id number]  Primary Care Provider: Jabier Cabrales DO   Date and time admitted to hospital: 3/3/2024 10:14 AM    * Toxic encephalopathy  Assessment & Plan  On admission, patient was agitated, anxious, having hallucinations again. Was given benadryl, ativan, and droperidol which subsequently dropped her BP. Still with somnolence and lethargy, and hallucinations/Toxic metabolic encephalopathy suspect again secondary to sleep deprivation, a/e/b somnolence, lethargy and hallucinations, treated previously with discontinuing Xanax and starting Trazodone.   Previously trazodone helped patient and she slept, no further hallucinations. Will continue with trazodone and also melatonin, but consult psych as well  CT head without any acute intracranial abnormalities.   Answering questions appropriately at this time, but very lethargic likely secondary to meds given in ED.   Hallucinations resolved, patient answering questions appropriate.  Friend stating that patient back to baseline.    Non-traumatic rhabdomyolysis-resolved as of 3/6/2024  Assessment & Plan  Admitted recently for same.  Since being discharged in the hospital, patient has been anxious and unable to sleep.  Has not been eating or drinking.  Has been having excessive stress.  CK on admission is 665 > 548  Started on IV fluid rehydration and trend, continues to trend down, stop IVF and monitor CK  CK normalized    Hallucinations  Assessment & Plan  Recently admitted to hospital for same. Unable to sleep again and anxious. Dizzy and lightheaded. Trazodone not helping her sleep. Is having more visual and auditory hallucinations at home, but has none in the hospital. Not eating or drinking.  CT head without any acute intracranial abnormalities  Discussed with patient regarding medication changes and still having hallucinations  about consulting psych, and patient agreeable to talk with them at this time.   Psych: seroquel 25 mg nightly in adjunct to venlafaxine, can be further titrated to effect if indicated as tolerated. Check vitamin D, TSH, RBC folate, RPR. If significant improvement of mental status not observed before discharge, reconsult psych. Would benefit from outpatient psychiatric follow-up for medication management with a psychotherapy/counseling component to assist her in optimizing her coping skills  Patient no longer with hallucinations and symptoms significantly improved.  Reconsulted psych to see if any further recommendations prior to discharge, no further recs.  No need for inpatient psych.  Can be discharged with outpatient psychiatry follow-up and therapy.  Continue with Seroquel, venlafaxine, trazodone as needed  Outpatient resources given    Essential hypertension  Assessment & Plan  Currently taking lisinopril 20, atenolol 50, Lasix 40.  Blood pressure initially elevated in the emergency department as patient has not been taking meds at home.  Was given Ativan, droperidol, Benadryl to calm her down in the ED however became hypotensive after this.  Critical care evaluated at bedside and said stable for floor, BP improves when you wake patient.   Blood pressure stabilized, can resume home medications on discharge and monitor blood pressure.    Stress at home  Assessment & Plan  Patient has a lot of stress at home, she takes care of her  who has PLS (primary lateral sclerosis)  Information given for outpatient resources for patient.    Myalgia  Assessment & Plan  Secondary to rhabdo.  Still with complaints of pain in the shoulder and neck area.  Continue Voltaren gel  Myalgias improved    History of Chiari malformation  Assessment & Plan  Had procedure done at [facility] she states and it was a long time ago.  Do not have any records at this time.  She states she is on Effexor for this to help with  serotonin    Gastroesophageal reflux disease without esophagitis  Assessment & Plan  Continue PPI      Medical Problems       Resolved Problems  Date Reviewed: 3/6/2024            Resolved    Non-traumatic rhabdomyolysis 3/6/2024     Resolved by  Kristine Justin PA-C    Hyponatremia 3/5/2024     Resolved by  Kristine Justin PA-C    DARIN (acute kidney injury) (HCC) 3/5/2024     Resolved by  Kristine Justin PA-C        Discharging Physician / Practitioner: Kristine Justin PA-C  PCP: Jabier Cabrales DO  Admission Date:   Admission Orders (From admission, onward)       Ordered        03/03/24 1417  INPATIENT ADMISSION  Once            03/03/24 1256  INPATIENT ADMISSION  Once,   Status:  Canceled                          Discharge Date: 03/06/24    Consultations During Hospital Stay:  Behavioral health    Procedures Performed:   none    Significant Findings / Test Results:   CT head without contrast   Final Result by Ashley Dozier MD (03/03 1101)      No acute intracranial CT abnormality. No significant interval change.                  Workstation performed: [id number]           Creatinine: 2.07 > 1.0  CK: 665 > 548 > 294 > 172  RPR syphilis screening normal  Vitamin D normal  TSH normal  UA without evidence of infection  Lactic normal  Troponin normal    Incidental Findings:   none     Test Results Pending at Discharge (will require follow up):   none     Outpatient Tests Requested:  Follow-up with PCP in 1 week  Follow-up with psychiatry outpatient  Follow-up with therapy and other outpatient resources to help with stressors at home    Complications:  none    Reason for Admission: hallucinations, DARIN, rhabdo    Hospital Course:   Luba Winkler is a 64 y.o. female patient who originally presented to the hospital on 3/3/2024 due to complaints of not sleeping, hallucinations.  In emergency department, patient noted to be very anxious and agitated was given Benadryl, Ativan, droperidol.  Patient became  lethargic after this and blood pressure dropped.  Was given fluid boluses and albumin to improve blood pressure.  Critical care was evaluating patient at bedside and stated that patient's blood pressure improves when she is awake and stable for the floor.  Patient's blood pressure improved after this.  Also found to have nontraumatic rhabdo and DARIN in the emergency department secondary to dehydration as patient was not eating and drinking.  Started on IV fluid rehydration and CK and creatinine normalized.  Also with complaints of hallucinations, psych was consulted secondary to this.  Psych recommended starting Seroquel 25 mg nightly, continuing venlafaxine, continue with trazodone as needed.  Patient was still concerned with her medications even though her symptoms were improving, psych was reconsulted on day of discharge and recommended no further changes in medications at this time.  Can follow-up outpatient, no indication for inpatient psych at this time.  Case management gave patient resources for outpatient psych and outpatient , also placed on AVS.  No further hallucinations after changing patient's medications.  On discharge, patient should continue to take medications as prescribed.  Should follow-up with PCP in 1 week.  Should follow-up with psychiatrist outpatient.  Should follow-up with outpatient therapy resources.  Instructed patient to return if she has any new or worsening symptoms for further evaluation.  Patient and friend at bedside verbalized understanding and agreement to the plan.    Please see above list of diagnoses and related plan for additional information.     Condition at Discharge: fair    Discharge Day Visit / Exam:   Subjective: Patient seen and examined at bedside today.  She is feeling significantly better.  She is glad that she had her medications adjusted while she was here and she is looking forward to going home.  No nausea, vomiting, diarrhea, constipation, fever,  "chills, chest pain, shortness of breath.  She is eating and drinking well this time and she has been sleeping well.  No further insomnia or hallucinations.  Vitals: Blood Pressure: 151/87 (03/06/24 0636)  Pulse: 81 (03/06/24 0636)  Temperature: 97.7 °F (36.5 °C) (03/06/24 0636)  Temp Source: Temporal (03/05/24 1945)  Respirations: 17 (03/05/24 1525)  Height: 5' 5\" (165.1 cm) (03/03/24 1019)  Weight - Scale: 107 kg (235 lb 3.7 oz) (03/06/24 0500)  SpO2: 95 % (03/06/24 0750)  Exam:   Physical Exam  Vitals reviewed.   Constitutional:       General: She is not in acute distress.     Appearance: She is obese. She is not ill-appearing or toxic-appearing.   HENT:      Head: Normocephalic and atraumatic.      Mouth/Throat:      Mouth: Mucous membranes are moist.   Cardiovascular:      Rate and Rhythm: Normal rate and regular rhythm.      Heart sounds: No murmur heard.  Pulmonary:      Effort: No respiratory distress.      Breath sounds: No stridor. No wheezing, rhonchi or rales.      Comments: Saturating well on room air  Abdominal:      General: Bowel sounds are normal. There is no distension.      Palpations: Abdomen is soft. There is no mass.      Tenderness: There is no abdominal tenderness.   Musculoskeletal:      Right lower leg: No edema.      Left lower leg: No edema.   Skin:     General: Skin is warm and dry.   Neurological:      General: No focal deficit present.      Mental Status: She is alert and oriented to person, place, and time.   Psychiatric:         Mood and Affect: Mood normal.         Behavior: Behavior normal.          Discussion with Family: Updated  (friend) at bedside.    Discharge instructions/Information to patient and family:   See after visit summary for information provided to patient and family.      Provisions for Follow-Up Care:  See after visit summary for information related to follow-up care and any pertinent home health orders.      Mobility at time of Discharge:   Basic " Mobility Inpatient Raw Score: 23  JH-HLM Goal: 7: Walk 25 feet or more  JH-HLM Achieved: 7: Walk 25 feet or more  HLM Goal achieved. Continue to encourage appropriate mobility.     Disposition:   Home    Planned Readmission: no but chance if patient does not take her medications     Discharge Statement:  I spent 52 minutes discharging the patient. This time was spent on the day of discharge. I had direct contact with the patient on the day of discharge. Greater than 50% of the total time was spent examining patient, answering all patient questions, arranging and discussing plan of care with patient as well as directly providing post-discharge instructions.  Additional time then spent on discharge activities.    Discharge Medications:  See after visit summary for reconciled discharge medications provided to patient and/or family.      **Please Note: This note may have been constructed using a voice recognition system**

## 2024-03-06 NOTE — PLAN OF CARE

## 2024-03-06 NOTE — ASSESSMENT & PLAN NOTE
Had procedure done at [facility] she states and it was a long time ago.  Do not have any records at this time.  She states she is on Effexor for this to help with serotonin   normal mood and affect. no apparent risk to self or others.

## 2024-03-06 NOTE — ASSESSMENT & PLAN NOTE
Recently admitted to hospital for same. Unable to sleep again and anxious. Dizzy and lightheaded. Trazodone not helping her sleep. Is having more visual and auditory hallucinations at home, but has none in the hospital. Not eating or drinking.  CT head without any acute intracranial abnormalities  Discussed with patient regarding medication changes and still having hallucinations about consulting psych, and patient agreeable to talk with them at this time.   Psych: seroquel 25 mg nightly in adjunct to venlafaxine, can be further titrated to effect if indicated as tolerated. Check vitamin D, TSH, RBC folate, RPR. If significant improvement of mental status not observed before discharge, reconsult psych. Would benefit from outpatient psychiatric follow-up for medication management with a psychotherapy/counseling component to assist her in optimizing her coping skills  Patient no longer with hallucinations and symptoms significantly improved.  Reconsulted psych to see if any further recommendations prior to discharge, no further recs.  No need for inpatient psych.  Can be discharged with outpatient psychiatry follow-up and therapy.  Continue with Seroquel, venlafaxine, trazodone as needed  Outpatient resources given

## 2024-03-06 NOTE — ASSESSMENT & PLAN NOTE
Admitted recently for same.  Since being discharged in the hospital, patient has been anxious and unable to sleep.  Has not been eating or drinking.  Has been having excessive stress.  CK on admission is 665 > 548  Started on IV fluid rehydration and trend, continues to trend down, stop IVF and monitor CK  CK normalized

## 2024-03-07 ENCOUNTER — TELEPHONE (OUTPATIENT)
Dept: PSYCHIATRY | Facility: CLINIC | Age: 64
End: 2024-03-07

## 2024-03-07 NOTE — TELEPHONE ENCOUNTER
The patient contacted the office, seeking services. The writer informed the patient that we are accepting new patients but currently operating off a wait list. The patient refused to be placed on the wait list.

## 2024-03-08 ENCOUNTER — APPOINTMENT (EMERGENCY)
Dept: RADIOLOGY | Facility: HOSPITAL | Age: 64
End: 2024-03-08
Payer: MEDICARE

## 2024-03-08 ENCOUNTER — HOSPITAL ENCOUNTER (EMERGENCY)
Facility: HOSPITAL | Age: 64
Discharge: HOME | End: 2024-03-08
Payer: MEDICARE

## 2024-03-08 VITALS
DIASTOLIC BLOOD PRESSURE: 81 MMHG | TEMPERATURE: 97.7 F | RESPIRATION RATE: 15 BRPM | BODY MASS INDEX: 37.16 KG/M2 | WEIGHT: 223.33 LBS | SYSTOLIC BLOOD PRESSURE: 166 MMHG | OXYGEN SATURATION: 96 % | HEART RATE: 61 BPM

## 2024-03-08 DIAGNOSIS — R60.0 BILATERAL LOWER EXTREMITY EDEMA: Primary | ICD-10-CM

## 2024-03-08 DIAGNOSIS — E83.42 HYPOMAGNESEMIA: ICD-10-CM

## 2024-03-08 DIAGNOSIS — E87.6 HYPOKALEMIA: ICD-10-CM

## 2024-03-08 LAB
ALBUMIN SERPL BCP-MCNC: 4.1 G/DL (ref 3.5–5)
ALP SERPL-CCNC: 74 U/L (ref 34–104)
ALT SERPL W P-5'-P-CCNC: 34 U/L (ref 7–52)
ANION GAP SERPL CALCULATED.3IONS-SCNC: 10 MMOL/L
APTT PPP: 45 SECONDS (ref 23–37)
AST SERPL W P-5'-P-CCNC: 34 U/L (ref 13–39)
ATRIAL RATE: 56 BPM
BASOPHILS # BLD AUTO: 0.05 THOUSANDS/ÂΜL (ref 0–0.1)
BASOPHILS NFR BLD AUTO: 1 % (ref 0–1)
BILIRUB SERPL-MCNC: 0.69 MG/DL (ref 0.2–1)
BNP SERPL-MCNC: 245 PG/ML (ref 0–100)
BUN SERPL-MCNC: 10 MG/DL (ref 5–25)
CALCIUM SERPL-MCNC: 9.6 MG/DL (ref 8.4–10.2)
CARDIAC TROPONIN I PNL SERPL HS: 5 NG/L
CHLORIDE SERPL-SCNC: 98 MMOL/L (ref 96–108)
CK SERPL-CCNC: 179 U/L (ref 26–192)
CO2 SERPL-SCNC: 33 MMOL/L (ref 21–32)
CREAT SERPL-MCNC: 0.9 MG/DL (ref 0.6–1.3)
EOSINOPHIL # BLD AUTO: 0.44 THOUSAND/ÂΜL (ref 0–0.61)
EOSINOPHIL NFR BLD AUTO: 6 % (ref 0–6)
ERYTHROCYTE [DISTWIDTH] IN BLOOD BY AUTOMATED COUNT: 14.9 % (ref 11.6–15.1)
FLUAV RNA RESP QL NAA+PROBE: NEGATIVE
FLUBV RNA RESP QL NAA+PROBE: NEGATIVE
FOLATE BLD-MCNC: 555 NG/ML
FOLATE RBC-MCNC: 1657 NG/ML
GFR SERPL CREATININE-BSD FRML MDRD: 66 ML/MIN/1.73SQ M
GLUCOSE SERPL-MCNC: 136 MG/DL (ref 65–140)
HCT VFR BLD AUTO: 33.5 % (ref 34–46.6)
HCT VFR BLD AUTO: 39.3 % (ref 34.8–46.1)
HGB BLD-MCNC: 12.9 G/DL (ref 11.5–15.4)
IMM GRANULOCYTES # BLD AUTO: 0.03 THOUSAND/UL (ref 0–0.2)
IMM GRANULOCYTES NFR BLD AUTO: 0 % (ref 0–2)
INR PPP: 1.18 (ref 0.84–1.19)
LACTATE SERPL-SCNC: 1.3 MMOL/L (ref 0.5–2)
LYMPHOCYTES # BLD AUTO: 1.82 THOUSANDS/ÂΜL (ref 0.6–4.47)
LYMPHOCYTES NFR BLD AUTO: 25 % (ref 14–44)
MAGNESIUM SERPL-MCNC: 1.4 MG/DL (ref 1.9–2.7)
MCH RBC QN AUTO: 29.5 PG (ref 26.8–34.3)
MCHC RBC AUTO-ENTMCNC: 32.8 G/DL (ref 31.4–37.4)
MCV RBC AUTO: 90 FL (ref 82–98)
MONOCYTES # BLD AUTO: 0.37 THOUSAND/ÂΜL (ref 0.17–1.22)
MONOCYTES NFR BLD AUTO: 5 % (ref 4–12)
NEUTROPHILS # BLD AUTO: 4.73 THOUSANDS/ÂΜL (ref 1.85–7.62)
NEUTS SEG NFR BLD AUTO: 63 % (ref 43–75)
NRBC BLD AUTO-RTO: 0 /100 WBCS
P AXIS: 56 DEGREES
PLATELET # BLD AUTO: 302 THOUSANDS/UL (ref 149–390)
PMV BLD AUTO: 10.1 FL (ref 8.9–12.7)
POTASSIUM SERPL-SCNC: 3.3 MMOL/L (ref 3.5–5.3)
PR INTERVAL: 150 MS
PROT SERPL-MCNC: 7.5 G/DL (ref 6.4–8.4)
PROTHROMBIN TIME: 15.3 SECONDS (ref 11.6–14.5)
QRS AXIS: 26 DEGREES
QRSD INTERVAL: 80 MS
QT INTERVAL: 458 MS
QTC INTERVAL: 441 MS
RBC # BLD AUTO: 4.37 MILLION/UL (ref 3.81–5.12)
RSV RNA RESP QL NAA+PROBE: NEGATIVE
SARS-COV-2 RNA RESP QL NAA+PROBE: NEGATIVE
SODIUM SERPL-SCNC: 141 MMOL/L (ref 135–147)
T WAVE AXIS: 44 DEGREES
VENTRICULAR RATE: 56 BPM
WBC # BLD AUTO: 7.44 THOUSAND/UL (ref 4.31–10.16)

## 2024-03-08 PROCEDURE — 71045 X-RAY EXAM CHEST 1 VIEW: CPT

## 2024-03-08 PROCEDURE — 94640 AIRWAY INHALATION TREATMENT: CPT

## 2024-03-08 PROCEDURE — 85730 THROMBOPLASTIN TIME PARTIAL: CPT | Performed by: PHYSICIAN ASSISTANT

## 2024-03-08 PROCEDURE — 96375 TX/PRO/DX INJ NEW DRUG ADDON: CPT

## 2024-03-08 PROCEDURE — 83605 ASSAY OF LACTIC ACID: CPT | Performed by: PHYSICIAN ASSISTANT

## 2024-03-08 PROCEDURE — 85610 PROTHROMBIN TIME: CPT | Performed by: PHYSICIAN ASSISTANT

## 2024-03-08 PROCEDURE — 82550 ASSAY OF CK (CPK): CPT | Performed by: PHYSICIAN ASSISTANT

## 2024-03-08 PROCEDURE — 83880 ASSAY OF NATRIURETIC PEPTIDE: CPT | Performed by: PHYSICIAN ASSISTANT

## 2024-03-08 PROCEDURE — 93005 ELECTROCARDIOGRAM TRACING: CPT

## 2024-03-08 PROCEDURE — 36415 COLL VENOUS BLD VENIPUNCTURE: CPT | Performed by: PHYSICIAN ASSISTANT

## 2024-03-08 PROCEDURE — 96365 THER/PROPH/DIAG IV INF INIT: CPT

## 2024-03-08 PROCEDURE — 0241U HB NFCT DS VIR RESP RNA 4 TRGT: CPT | Performed by: PHYSICIAN ASSISTANT

## 2024-03-08 PROCEDURE — 83735 ASSAY OF MAGNESIUM: CPT | Performed by: PHYSICIAN ASSISTANT

## 2024-03-08 PROCEDURE — 80053 COMPREHEN METABOLIC PANEL: CPT | Performed by: PHYSICIAN ASSISTANT

## 2024-03-08 PROCEDURE — 84484 ASSAY OF TROPONIN QUANT: CPT | Performed by: PHYSICIAN ASSISTANT

## 2024-03-08 PROCEDURE — 85025 COMPLETE CBC W/AUTO DIFF WBC: CPT | Performed by: PHYSICIAN ASSISTANT

## 2024-03-08 PROCEDURE — 99284 EMERGENCY DEPT VISIT MOD MDM: CPT

## 2024-03-08 PROCEDURE — 99285 EMERGENCY DEPT VISIT HI MDM: CPT | Performed by: PHYSICIAN ASSISTANT

## 2024-03-08 RX ORDER — POTASSIUM CHLORIDE 750 MG/1
20 TABLET, EXTENDED RELEASE ORAL 2 TIMES DAILY
Qty: 28 TABLET | Refills: 0 | Status: SHIPPED | OUTPATIENT
Start: 2024-03-08 | End: 2024-03-15

## 2024-03-08 RX ORDER — MAGNESIUM OXIDE 400 MG/1
400 TABLET ORAL 2 TIMES DAILY
Qty: 14 TABLET | Refills: 0 | Status: SHIPPED | OUTPATIENT
Start: 2024-03-08 | End: 2024-03-15

## 2024-03-08 RX ORDER — FUROSEMIDE 10 MG/ML
40 INJECTION INTRAMUSCULAR; INTRAVENOUS ONCE
Status: COMPLETED | OUTPATIENT
Start: 2024-03-08 | End: 2024-03-08

## 2024-03-08 RX ORDER — IPRATROPIUM BROMIDE AND ALBUTEROL SULFATE 2.5; .5 MG/3ML; MG/3ML
3 SOLUTION RESPIRATORY (INHALATION) ONCE
Status: DISCONTINUED | OUTPATIENT
Start: 2024-03-08 | End: 2024-03-08

## 2024-03-08 RX ORDER — MAGNESIUM SULFATE 1 G/100ML
1 INJECTION INTRAVENOUS ONCE
Status: COMPLETED | OUTPATIENT
Start: 2024-03-08 | End: 2024-03-08

## 2024-03-08 RX ORDER — ALBUTEROL SULFATE 90 UG/1
2 AEROSOL, METERED RESPIRATORY (INHALATION) ONCE
Status: COMPLETED | OUTPATIENT
Start: 2024-03-08 | End: 2024-03-08

## 2024-03-08 RX ADMIN — FUROSEMIDE 40 MG: 10 INJECTION, SOLUTION INTRAMUSCULAR; INTRAVENOUS at 10:00

## 2024-03-08 RX ADMIN — ALBUTEROL SULFATE 2 PUFF: 90 AEROSOL, METERED RESPIRATORY (INHALATION) at 11:27

## 2024-03-08 RX ADMIN — MAGNESIUM SULFATE HEPTAHYDRATE 1 G: 1 INJECTION, SOLUTION INTRAVENOUS at 10:32

## 2024-03-08 NOTE — ED ATTENDING ATTESTATION
3/8/2024  I, Aga Lees DO, discussed the patient with the resident/non-physician practitioner and agree with the resident's/non-physician practitioner's findings, Plan of Care, and MDM as documented in the resident's/non-physician practitioner's note, except where noted. All available la     At this point I agree with the current assessment done in the Emergency Department.    ED Course       Patient had a stable ED course-she is stable for discharge

## 2024-03-08 NOTE — ED PROVIDER NOTES
History  Chief Complaint   Patient presents with    Leg Swelling     Discharged 2 days ago from [facility] (formerly [facility]), states yesterday started with increased bilateral lower leg swelling, states weighing herself and weight has not increased but feels that she is retaining fluid, took Lasix x2 this morning     Patient is a 64-year-old female with a past medical history of COPD, hypertension, psychiatric disorder who presents with a complaint of lower leg swelling.  The patient states that she is on Lasix 40 mg daily and noted over the last few days that she has had lower leg swelling.  She states that she feels short of breath but not more than she typically does.  She is concerned because of the swelling but also around 4 AM she had nausea and diarrhea.  She currently still has the urge to have diarrhea but has not had any vomiting.  Denies any falls or traumas abdominal pain back pain dysuria hematuria.      Ankle Swelling  Location:  Leg  Injury: no    Leg location:  L lower leg and R lower leg  Pain details:     Onset quality:  Gradual    Timing:  Constant    Progression:  Worsening  Chronicity:  New  Foreign body present:  No foreign bodies  Tetanus status:  Unknown  Relieved by:  Nothing  Worsened by:  Nothing  Ineffective treatments:  Rest  Associated symptoms: no decreased ROM        Prior to Admission Medications   Prescriptions Last Dose Informant Patient Reported? Taking?   Diclofenac Sodium (VOLTAREN) 1 %   No No   Sig: Apply 2 g topically 4 (four) times a day   Multiple Vitamins-Minerals (CENTRUM SILVER 50+MEN PO)   Yes No   Sig: Take 1 tablet by mouth   QUEtiapine (SEROquel) 25 mg tablet   No No   Sig: Take 1 tablet (25 mg total) by mouth daily at bedtime   aspirin (Aspirin 81) 81 mg chewable tablet   Yes No   Sig: Chew 81 mg   atenolol (TENORMIN) 50 mg tablet  Self Yes No   Sig: Take 50 mg by mouth daily at bedtime    ergocalciferol (ERGOCALCIFEROL) 1.25 MG (83408 UT) capsule   Yes No   Sig: Take 50,000 Units by mouth  once a week   furosemide (Lasix) 40 mg tablet   No No   Sig: Take 1 tablet (40 mg total) by mouth daily   lisinopril (ZESTRIL) 20 mg tablet   Yes No   Sig: Take 20 mg by mouth daily    loratadine (CLARITIN) 10 mg tablet   No No   Sig: Take 1 tablet (10 mg total) by mouth daily   magnesium oxide (MAG-OX) 400 mg tablet   No No   Sig: Take 1 tablet (400 mg total) by mouth daily Do not start before February 27, 2024.   magnesium oxide (MAG-OX) 400 mg tablet   No Yes   Sig: Take 1 tablet (400 mg total) by mouth 2 (two) times a day for 7 days   naproxen sodium (ALEVE) 220 MG tablet   Yes No   Sig: Take 440 mg by mouth every 12 (twelve) hours as needed   omeprazole (PriLOSEC) 20 mg delayed release capsule   Yes No   Sig: Take 20 mg by mouth daily   simvastatin (ZOCOR) 20 mg tablet   Yes No   Sig: Take 20 mg by mouth daily at bedtime   traZODone (DESYREL) 50 mg tablet   No No   Sig: Take 1 tablet (50 mg total) by mouth daily at bedtime as needed for sleep   venlafaxine (EFFEXOR-XR) 150 mg 24 hr capsule   Yes No   Sig: Take 150 mg by mouth daily    vitamin B-12 (VITAMIN B-12) 1,000 mcg tablet   Yes No   Sig: Take by mouth daily      Facility-Administered Medications: None       Past Medical History:   Diagnosis Date    COPD (chronic obstructive pulmonary disease) (HCC)     Diverticulitis     GERD (gastroesophageal reflux disease)     Hypertension     Psychiatric disorder        Past Surgical History:   Procedure Laterality Date    ABDOMINAL SURGERY      CERVICAL SPINE SURGERY      rods placed in c4,c5,c6       History reviewed. No pertinent family history.  I have reviewed and agree with the history as documented.    E-Cigarette/Vaping    E-Cigarette Use Never User      E-Cigarette/Vaping Substances     Social History     Tobacco Use    Smoking status: Every Day     Current packs/day: 1.00     Types: Cigarettes    Smokeless tobacco: Never   Vaping Use    Vaping status: Never Used   Substance Use Topics    Alcohol use: Never     Drug use: Never       Review of Systems   All other systems reviewed and are negative.      Physical Exam  Physical Exam  Vitals and nursing note reviewed.   Constitutional:       General: She is not in acute distress.     Appearance: She is well-developed.   HENT:      Head: Normocephalic and atraumatic.      Right Ear: External ear normal.      Left Ear: External ear normal.   Eyes:      Extraocular Movements: Extraocular movements intact.      Pupils: Pupils are equal, round, and reactive to light.   Cardiovascular:      Rate and Rhythm: Normal rate and regular rhythm.      Pulses: Normal pulses.      Heart sounds: No murmur heard.  Pulmonary:      Effort: Pulmonary effort is normal. No respiratory distress.      Breath sounds: Normal breath sounds. No wheezing.   Abdominal:      General: Bowel sounds are normal.      Palpations: Abdomen is soft. There is no mass.      Tenderness: There is no abdominal tenderness. There is no rebound.      Hernia: No hernia is present.   Musculoskeletal:      Cervical back: Normal range of motion and neck supple.      Right lower leg: Edema present.      Left lower leg: Edema present.   Skin:     General: Skin is warm and dry.      Capillary Refill: Capillary refill takes less than 2 seconds.   Neurological:      Mental Status: She is alert and oriented to person, place, and time.      Coordination: Coordination normal.   Psychiatric:         Behavior: Behavior normal.         Vital Signs  ED Triage Vitals [03/08/24 0847]   Temperature Pulse Respirations Blood Pressure SpO2   97.7 °F (36.5 °C) 67 18 (!) 173/86 95 %      Temp src Heart Rate Source Patient Position - Orthostatic VS BP Location FiO2 (%)   -- Monitor Lying Right arm --      Pain Score       --           Vitals:    03/08/24 0932 03/08/24 1015 03/08/24 1045 03/08/24 1100   BP: 144/93 159/76 149/89 166/81   Pulse: 62 63 59 61   Patient Position - Orthostatic VS: Lying Lying  Lying         Visual Acuity      ED  Medications  Medications   furosemide (LASIX) injection 40 mg (40 mg Intravenous Given 3/8/24 1000)   magnesium sulfate IVPB (premix) SOLN 1 g (0 g Intravenous Stopped 3/8/24 1132)   albuterol (PROVENTIL HFA,VENTOLIN HFA) inhaler 2 puff (2 puffs Inhalation Given 3/8/24 1127)       Diagnostic Studies  Results Reviewed       Procedure Component Value Units Date/Time    FLU/RSV/COVID - if FLU/RSV clinically relevant [[id number]]  (Normal) Collected: 03/08/24 0941    Lab Status: Final result Specimen: Nares from Nose Updated: 03/08/24 1057     SARS-CoV-2 Negative     INFLUENZA A PCR Negative     INFLUENZA B PCR Negative     RSV PCR Negative    Narrative:      FOR PEDIATRIC PATIENTS - copy/paste COVID Guidelines URL to browser: [email or web address]    SARS-CoV-2 assay is a Nucleic Acid Amplification assay intended for the  qualitative detection of nucleic acid from SARS-CoV-2 in nasopharyngeal  swabs. Results are for the presumptive identification of SARS-CoV-2 RNA.    Positive results are indicative of infection with SARS-CoV-2, the virus  causing COVID-19, but do not rule out bacterial infection or co-infection  with other viruses. Laboratories within the [place] and its  territories are required to report all positive results to the appropriate  public health authorities. Negative results do not preclude SARS-CoV-2  infection and should not be used as the sole basis for treatment or other  patient management decisions. Negative results must be combined with  clinical observations, patient history, and epidemiological information.  This test has not been FDA cleared or approved.    This test has been authorized by FDA under an Emergency Use Authorization  (EUA). This test is only authorized for the duration of time the  declaration that circumstances exist justifying the authorization of the  emergency use of an in vitro diagnostic tests for detection of  SARS-CoV-2  virus and/or diagnosis of COVID-19 infection under section 564(b)(1) of  the Act, 21 U.S.C. 360bbb-3(b)(1), unless the authorization is terminated  or revoked sooner. The test has been validated but independent review by FDA  and CLIA is pending.    Test performed using [employer] GeneXpert: This RT-PCR assay targets N2,  a region unique to SARS-CoV-2. A conserved region in the E-gene was chosen  for pan-Sarbecovirus detection which includes SARS-CoV-2.    According to CMS-2020-01-R, this platform meets the definition of high-throughput technology.    HS Troponin 0hr (reflex protocol) [[id number]]  (Normal) Collected: 03/08/24 0941    Lab Status: Final result Specimen: Blood from Arm, Left Updated: 03/08/24 1014     hs TnI 0hr 5 ng/L     Comprehensive metabolic panel [[id number]]  (Abnormal) Collected: 03/08/24 0941    Lab Status: Final result Specimen: Blood from Arm, Left Updated: 03/08/24 1013     Sodium 141 mmol/L      Potassium 3.3 mmol/L      Chloride 98 mmol/L      CO2 33 mmol/L      ANION GAP 10 mmol/L      BUN 10 mg/dL      Creatinine 0.90 mg/dL      Glucose 136 mg/dL      Calcium 9.6 mg/dL      AST 34 U/L      ALT 34 U/L      Alkaline Phosphatase 74 U/L      Total Protein 7.5 g/dL      Albumin 4.1 g/dL      Total Bilirubin 0.69 mg/dL      eGFR 66 ml/min/1.73sq m     Narrative:      National Kidney Disease Foundation guidelines for Chronic Kidney Disease (CKD):     Stage 1 with normal or high GFR (GFR > 90 mL/min/1.73 square meters)    Stage 2 Mild CKD (GFR = 60-89 mL/min/1.73 square meters)    Stage 3A Moderate CKD (GFR = 45-59 mL/min/1.73 square meters)    Stage 3B Moderate CKD (GFR = 30-44 mL/min/1.73 square meters)    Stage 4 Severe CKD (GFR = 15-29 mL/min/1.73 square meters)    Stage 5 End Stage CKD (GFR <15 mL/min/1.73 square meters)  Note: GFR calculation is accurate only with a steady state creatinine    CK [[id number]]  (Normal) Collected: 03/08/24 0941    Lab Status: Final result Specimen:  Blood from Arm, Left Updated: 03/08/24 1013     Total  U/L     Magnesium [[id number]]  (Abnormal) Collected: 03/08/24 0941    Lab Status: Final result Specimen: Blood from Arm, Left Updated: 03/08/24 1013     Magnesium 1.4 mg/dL     B-Type Natriuretic Peptide(BNP) [[id number]]  (Abnormal) Collected: 03/08/24 0941    Lab Status: Final result Specimen: Blood from Arm, Left Updated: 03/08/24 1012      pg/mL     Lactic acid, plasma (w/reflex if result > 2.0) [[id number]]  (Normal) Collected: 03/08/24 0941    Lab Status: Final result Specimen: Blood from Arm, Left Updated: 03/08/24 1010     LACTIC ACID 1.3 mmol/L     Narrative:      Result may be elevated if tourniquet was used during collection.    Protime-INR [[id number]]  (Abnormal) Collected: 03/08/24 0941    Lab Status: Final result Specimen: Blood from Arm, Left Updated: 03/08/24 1002     Protime 15.3 seconds      INR 1.18    APTT [[id number]]  (Abnormal) Collected: 03/08/24 0941    Lab Status: Final result Specimen: Blood from Arm, Left Updated: 03/08/24 1002     PTT 45 seconds     CBC and differential [[id number]] Collected: 03/08/24 0941    Lab Status: Final result Specimen: Blood from Arm, Left Updated: 03/08/24 0948     WBC 7.44 Thousand/uL      RBC 4.37 Million/uL      Hemoglobin 12.9 g/dL      Hematocrit 39.3 %      MCV 90 fL      MCH 29.5 pg      MCHC 32.8 g/dL      RDW 14.9 %      MPV 10.1 fL      Platelets 302 Thousands/uL      nRBC 0 /100 WBCs      Neutrophils Relative 63 %      Immat GRANS % 0 %      Lymphocytes Relative 25 %      Monocytes Relative 5 %      Eosinophils Relative 6 %      Basophils Relative 1 %      Neutrophils Absolute 4.73 Thousands/µL      Immature Grans Absolute 0.03 Thousand/uL      Lymphocytes Absolute 1.82 Thousands/µL      Monocytes Absolute 0.37 Thousand/µL      Eosinophils Absolute 0.44 Thousand/µL      Basophils Absolute 0.05 Thousands/µL                    XR chest 1 view portable   Final Result by Dhruv Lucas,  DO (03/08 1037)      Mild hyperinflation. No acute cardiopulmonary disease.         Resident: JULIO CESAR ARREDONDO I, the attending radiologist, have reviewed the images and agree with the final report above.      Workstation performed: [id number]                    Procedures  ECG 12 Lead Documentation Only    Date/Time: 3/8/2024 10:17 AM    Performed by: Toni Luz PA-C  Authorized by: Toni Luz PA-C    Indications / Diagnosis:  Edema  ECG reviewed by me, the ED Provider: yes    Patient location:  ED  Previous ECG:     Previous ECG:  Unavailable  Interpretation:     Interpretation: abnormal    Rate:     ECG rate:  56    ECG rate assessment: bradycardic    Rhythm:     Rhythm: sinus bradycardia             ED Course  ED Course as of 03/08/24 1315   Fri Mar 08, 2024   1019 BNP(!): 245   1019 MAGNESIUM(!): 1.4   1120 BNP(!): 245  Was 500 in the past    1127 Patient ambulated on room air remained 93 to 96% good waveform.  Unsure as to the reading of 84% by nursing staff may have been a false read or from the instrumentation The patient does speak and moves her hands with doing so, so this could also cause this to be inaccurate                                 SBIRT 22yo+      Flowsheet Row Most Recent Value   Initial Alcohol Screen: US AUDIT-C     1. How often do you have a drink containing alcohol? 0 Filed at: 03/08/2024 1128   2. How many drinks containing alcohol do you have on a typical day you are drinking?  0 Filed at: 03/08/2024 1128   3b. FEMALE Any Age, or MALE 65+: How often do you have 4 or more drinks on one occassion? 0 Filed at: 03/08/2024 1128   Audit-C Score 0 Filed at: 03/08/2024 1128   MIKE: How many times in the past year have you...    Used an illegal drug or used a prescription medication for non-medical reasons? Never Filed at: 03/08/2024 1128                      Medical Decision Making  Patient is a 64-year-old female with a past medical history of COPD, hypertension,  psychiatric disorder who presents with a complaint of lower leg swelling.  The patient states that she is on Lasix 40 mg daily and noted over the last few days that she has had lower leg swelling.  She states that she feels short of breath but not more than she typically does.  She is concerned because of the swelling but also around 4 AM she had nausea and diarrhea.  She currently still has the urge to have diarrhea but has not had any vomiting.  Denies any falls or traumas abdominal pain back pain dysuria hematuria.    Patient examination did have lower leg edema.  Clear lung sounds and patient was on room air and ambulated without any dyspnea or hypoxia.  She does have a history of COPD and lung field was reevaluated after albuterol inhaler and no changes.  The patient was given IV Lasix while in the emergency department due to the edema.  Was found to have low potassium and low magnesium level which was replenished in the emergency department.  Patient at this time was discharged home and instructed to continue daily medications and start potassium and magnesium supplementation.  Patient expressed understanding was agreement treatment plan.  Patient denied any suicidal ideations homicidal ideations.  Upon previous admissions there were concerns about the patient's Chai and insomnia however the patient states that since being discharged she has been sleeping well and her anxiety has significantly improved with the medications.    Torrential diagnosis included but was not limited to electrolyte abnormality, DARIN, CKD, COPD, pneumonia, CHF, hypertension, dependent edema    Amount and/or Complexity of Data Reviewed  External Data Reviewed: labs and notes.  Labs: ordered. Decision-making details documented in ED Course.  Radiology: ordered and independent interpretation performed. Decision-making details documented in ED Course.  ECG/medicine tests: ordered and independent interpretation performed. Decision-making  details documented in ED Course.    Risk  OTC drugs.  Prescription drug management.             Disposition  Final diagnoses:   Bilateral lower extremity edema   Hypokalemia     Time reflects when diagnosis was documented in both MDM as applicable and the Disposition within this note       Time User Action Codes Description Comment    3/8/2024 11:20 AM Toni Luz [R60.0] Bilateral lower extremity edema     3/8/2024 11:23 AM Toni Luz [E83.42] Hypomagnesemia     3/8/2024 11:23 AM Toni Luz [E87.6] Hypokalemia           ED Disposition       ED Disposition   Discharge    Condition   Stable    Date/Time   Fri Mar 8, 2024 1120    Comment   Luba Winkler discharge to home/self care.                   Follow-up Information    None         Discharge Medication List as of 3/8/2024 11:30 AM        START taking these medications    Details   potassium chloride (Klor-Con M10) 10 mEq tablet Take 2 tablets (20 mEq total) by mouth 2 (two) times a day for 7 days, Starting Fri 3/8/2024, Until Fri 3/15/2024, Normal           CONTINUE these medications which have CHANGED    Details   magnesium oxide (MAG-OX) 400 mg tablet Take 1 tablet (400 mg total) by mouth 2 (two) times a day for 7 days, Starting Fri 3/8/2024, Until Fri 3/15/2024, Normal           CONTINUE these medications which have NOT CHANGED    Details   aspirin (Aspirin 81) 81 mg chewable tablet Chew 81 mg, Historical Med      atenolol (TENORMIN) 50 mg tablet Take 50 mg by mouth daily at bedtime , Historical Med      Diclofenac Sodium (VOLTAREN) 1 % Apply 2 g topically 4 (four) times a day, Starting Mon 2/26/2024, Normal      ergocalciferol (ERGOCALCIFEROL) 1.25 MG (68616 UT) capsule Take 50,000 Units by mouth once a week, Historical Med      furosemide (Lasix) 40 mg tablet Take 1 tablet (40 mg total) by mouth daily, Starting Mon 2/26/2024, No Print      lisinopril (ZESTRIL) 20 mg tablet Take 20 mg by mouth daily , Historical Med      loratadine  (CLARITIN) 10 mg tablet Take 1 tablet (10 mg total) by mouth daily, Starting Tue 2/27/2024, Normal      Multiple Vitamins-Minerals (CENTRUM SILVER 50+MEN PO) Take 1 tablet by mouth, Historical Med      naproxen sodium (ALEVE) 220 MG tablet Take 440 mg by mouth every 12 (twelve) hours as needed, Historical Med      omeprazole (PriLOSEC) 20 mg delayed release capsule Take 20 mg by mouth daily, Historical Med      QUEtiapine (SEROquel) 25 mg tablet Take 1 tablet (25 mg total) by mouth daily at bedtime, Starting Wed 3/6/2024, Normal      simvastatin (ZOCOR) 20 mg tablet Take 20 mg by mouth daily at bedtime, Historical Med      traZODone (DESYREL) 50 mg tablet Take 1 tablet (50 mg total) by mouth daily at bedtime as needed for sleep, Starting Mon 2/26/2024, Normal      venlafaxine (EFFEXOR-XR) 150 mg 24 hr capsule Take 150 mg by mouth daily , Historical Med      vitamin B-12 (VITAMIN B-12) 1,000 mcg tablet Take by mouth daily, Historical Med             No discharge procedures on file.    PDMP Review         Value Time User    PDMP Reviewed  Yes 11/24/2020 11:31 AM Vijay Diaz MD            ED Provider  Electronically Signed by             Toni Luz PA-C  03/08/24 1152

## 2024-03-08 NOTE — DISCHARGE INSTRUCTIONS
Please continue the lasix 40 mg every day     Inhaler 1-2 puffs every 4-6 hours as needed for wheezing or shortness of breath

## 2024-03-08 NOTE — ED NOTES
SpO2 84% on RA, patient placed on 2L nasal cannula. Toni made aware.      Virginia Jose RN  03/08/24 8826

## 2024-04-16 ENCOUNTER — HOSPITAL ENCOUNTER (EMERGENCY)
Facility: HOSPITAL | Age: 64
Discharge: HOME | End: 2024-04-17
Attending: EMERGENCY MEDICINE | Admitting: EMERGENCY MEDICINE
Payer: MEDICARE

## 2024-04-16 DIAGNOSIS — E83.42 HYPOMAGNESEMIA: ICD-10-CM

## 2024-04-16 DIAGNOSIS — E87.6 HYPOKALEMIA: Primary | ICD-10-CM

## 2024-04-16 LAB
ALBUMIN SERPL BCP-MCNC: 4.1 G/DL (ref 3.5–5)
ALP SERPL-CCNC: 56 U/L (ref 34–104)
ALT SERPL W P-5'-P-CCNC: 20 U/L (ref 7–52)
ANION GAP SERPL CALCULATED.3IONS-SCNC: 10 MMOL/L (ref 4–13)
APTT PPP: 37 SECONDS (ref 23–37)
AST SERPL W P-5'-P-CCNC: 26 U/L (ref 13–39)
BASOPHILS # BLD AUTO: 0.05 THOUSANDS/ÂΜL (ref 0–0.1)
BASOPHILS NFR BLD AUTO: 1 % (ref 0–1)
BILIRUB SERPL-MCNC: 0.78 MG/DL (ref 0.2–1)
BNP SERPL-MCNC: 34 PG/ML (ref 0–100)
BUN SERPL-MCNC: 11 MG/DL (ref 5–25)
CALCIUM SERPL-MCNC: 9.3 MG/DL (ref 8.4–10.2)
CARDIAC TROPONIN I PNL SERPL HS: 5 NG/L
CHLORIDE SERPL-SCNC: 101 MMOL/L (ref 96–108)
CK SERPL-CCNC: 183 U/L (ref 26–192)
CO2 SERPL-SCNC: 29 MMOL/L (ref 21–32)
CREAT SERPL-MCNC: 0.75 MG/DL (ref 0.6–1.3)
EOSINOPHIL # BLD AUTO: 0.3 THOUSAND/ÂΜL (ref 0–0.61)
EOSINOPHIL NFR BLD AUTO: 4 % (ref 0–6)
ERYTHROCYTE [DISTWIDTH] IN BLOOD BY AUTOMATED COUNT: 14.3 % (ref 11.6–15.1)
GFR SERPL CREATININE-BSD FRML MDRD: 83 ML/MIN/1.73SQ M
GLUCOSE SERPL-MCNC: 88 MG/DL (ref 65–140)
HCT VFR BLD AUTO: 35.4 % (ref 34.8–46.1)
HGB BLD-MCNC: 12 G/DL (ref 11.5–15.4)
IMM GRANULOCYTES # BLD AUTO: 0.01 THOUSAND/UL (ref 0–0.2)
IMM GRANULOCYTES NFR BLD AUTO: 0 % (ref 0–2)
INR PPP: 1.09 (ref 0.84–1.19)
LIPASE SERPL-CCNC: 27 U/L (ref 11–82)
LYMPHOCYTES # BLD AUTO: 3.25 THOUSANDS/ÂΜL (ref 0.6–4.47)
LYMPHOCYTES NFR BLD AUTO: 39 % (ref 14–44)
MAGNESIUM SERPL-MCNC: 1.4 MG/DL (ref 1.9–2.7)
MCH RBC QN AUTO: 29.9 PG (ref 26.8–34.3)
MCHC RBC AUTO-ENTMCNC: 33.9 G/DL (ref 31.4–37.4)
MCV RBC AUTO: 88 FL (ref 82–98)
MONOCYTES # BLD AUTO: 0.56 THOUSAND/ÂΜL (ref 0.17–1.22)
MONOCYTES NFR BLD AUTO: 7 % (ref 4–12)
NEUTROPHILS # BLD AUTO: 4.14 THOUSANDS/ÂΜL (ref 1.85–7.62)
NEUTS SEG NFR BLD AUTO: 49 % (ref 43–75)
NRBC BLD AUTO-RTO: 0 /100 WBCS
PLATELET # BLD AUTO: 199 THOUSANDS/UL (ref 149–390)
PMV BLD AUTO: 10.1 FL (ref 8.9–12.7)
POTASSIUM SERPL-SCNC: 2.8 MMOL/L (ref 3.5–5.3)
PROT SERPL-MCNC: 6.7 G/DL (ref 6.4–8.4)
PROTHROMBIN TIME: 14.4 SECONDS (ref 11.6–14.5)
RBC # BLD AUTO: 4.01 MILLION/UL (ref 3.81–5.12)
SODIUM SERPL-SCNC: 140 MMOL/L (ref 135–147)
TSH SERPL DL<=0.05 MIU/L-ACNC: 1.95 UIU/ML (ref 0.45–4.5)
WBC # BLD AUTO: 8.31 THOUSAND/UL (ref 4.31–10.16)

## 2024-04-16 PROCEDURE — 83880 ASSAY OF NATRIURETIC PEPTIDE: CPT | Performed by: EMERGENCY MEDICINE

## 2024-04-16 PROCEDURE — 85610 PROTHROMBIN TIME: CPT | Performed by: EMERGENCY MEDICINE

## 2024-04-16 PROCEDURE — 80053 COMPREHEN METABOLIC PANEL: CPT | Performed by: EMERGENCY MEDICINE

## 2024-04-16 PROCEDURE — 85730 THROMBOPLASTIN TIME PARTIAL: CPT | Performed by: EMERGENCY MEDICINE

## 2024-04-16 PROCEDURE — 84443 ASSAY THYROID STIM HORMONE: CPT | Performed by: EMERGENCY MEDICINE

## 2024-04-16 PROCEDURE — 83690 ASSAY OF LIPASE: CPT | Performed by: EMERGENCY MEDICINE

## 2024-04-16 PROCEDURE — 84484 ASSAY OF TROPONIN QUANT: CPT | Performed by: EMERGENCY MEDICINE

## 2024-04-16 PROCEDURE — 99283 EMERGENCY DEPT VISIT LOW MDM: CPT

## 2024-04-16 PROCEDURE — 96367 TX/PROPH/DG ADDL SEQ IV INF: CPT

## 2024-04-16 PROCEDURE — 99285 EMERGENCY DEPT VISIT HI MDM: CPT | Performed by: EMERGENCY MEDICINE

## 2024-04-16 PROCEDURE — 96365 THER/PROPH/DIAG IV INF INIT: CPT

## 2024-04-16 PROCEDURE — 36415 COLL VENOUS BLD VENIPUNCTURE: CPT | Performed by: EMERGENCY MEDICINE

## 2024-04-16 PROCEDURE — 85025 COMPLETE CBC W/AUTO DIFF WBC: CPT | Performed by: EMERGENCY MEDICINE

## 2024-04-16 PROCEDURE — 82550 ASSAY OF CK (CPK): CPT | Performed by: EMERGENCY MEDICINE

## 2024-04-16 PROCEDURE — 83735 ASSAY OF MAGNESIUM: CPT | Performed by: EMERGENCY MEDICINE

## 2024-04-16 RX ORDER — POTASSIUM CHLORIDE 14.9 MG/ML
20 INJECTION INTRAVENOUS
Qty: 200 ML | Refills: 0 | Status: DISCONTINUED | OUTPATIENT
Start: 2024-04-16 | End: 2024-04-17 | Stop reason: HOSPADM

## 2024-04-16 RX ORDER — POTASSIUM CHLORIDE 20 MEQ/1
40 TABLET, EXTENDED RELEASE ORAL ONCE
Status: COMPLETED | OUTPATIENT
Start: 2024-04-16 | End: 2024-04-16

## 2024-04-16 RX ORDER — MAGNESIUM SULFATE HEPTAHYDRATE 40 MG/ML
2 INJECTION, SOLUTION INTRAVENOUS ONCE
Status: COMPLETED | OUTPATIENT
Start: 2024-04-16 | End: 2024-04-16

## 2024-04-16 RX ADMIN — POTASSIUM CHLORIDE 20 MEQ: 14.9 INJECTION, SOLUTION INTRAVENOUS at 23:49

## 2024-04-16 RX ADMIN — MAGNESIUM SULFATE HEPTAHYDRATE 2 G: 40 INJECTION, SOLUTION INTRAVENOUS at 23:11

## 2024-04-16 RX ADMIN — POTASSIUM CHLORIDE 40 MEQ: 1500 TABLET, EXTENDED RELEASE ORAL at 23:29

## 2024-04-17 VITALS
OXYGEN SATURATION: 96 % | DIASTOLIC BLOOD PRESSURE: 70 MMHG | SYSTOLIC BLOOD PRESSURE: 115 MMHG | RESPIRATION RATE: 16 BRPM | WEIGHT: 214.95 LBS | HEIGHT: 66 IN | TEMPERATURE: 99 F | BODY MASS INDEX: 34.55 KG/M2 | HEART RATE: 72 BPM

## 2024-04-17 PROCEDURE — 96366 THER/PROPH/DIAG IV INF ADDON: CPT

## 2024-04-17 NOTE — ED PROVIDER NOTES
History  Chief Complaint   Patient presents with    Headache     + headache. + nausea. Denies vomiting. + leg swelling bilaterally. Per pt she is having auditory hallucinations and hearing voices. Denies having visual hallucinations. Denies SI.      Patient is a 64-year-old female presenting to the emergency department complaining of headache and nausea, also complaining of bilateral lower extremity swelling, right greater than left, denies any leg pain, no history of recent trauma she reports having some auditory hallucinations at times, this has been going on intermittently for months, patient persistently reporting history of Chiari malformation and concerned about CSF fluid leaking down into her legs which is causing her leg swelling, states her pain management doctor has been trying to get her into see a neurologist but she has not been able to do so yet        Prior to Admission Medications   Prescriptions Last Dose Informant Patient Reported? Taking?   Diclofenac Sodium (VOLTAREN) 1 %   No No   Sig: Apply 2 g topically 4 (four) times a day   Multiple Vitamins-Minerals (CENTRUM SILVER 50+MEN PO)   Yes No   Sig: Take 1 tablet by mouth   QUEtiapine (SEROquel) 25 mg tablet   No No   Sig: Take 1 tablet (25 mg total) by mouth daily at bedtime   aspirin (Aspirin 81) 81 mg chewable tablet   Yes No   Sig: Chew 81 mg   atenolol (TENORMIN) 50 mg tablet  Self Yes No   Sig: Take 50 mg by mouth daily at bedtime    ergocalciferol (ERGOCALCIFEROL) 1.25 MG (42047 UT) capsule   Yes No   Sig: Take 50,000 Units by mouth once a week   furosemide (Lasix) 40 mg tablet   No No   Sig: Take 1 tablet (40 mg total) by mouth daily   lisinopril (ZESTRIL) 20 mg tablet   Yes No   Sig: Take 20 mg by mouth daily    loratadine (CLARITIN) 10 mg tablet   No No   Sig: Take 1 tablet (10 mg total) by mouth daily   naproxen sodium (ALEVE) 220 MG tablet   Yes No   Sig: Take 440 mg by mouth every 12 (twelve) hours as needed   omeprazole (PriLOSEC) 20  mg delayed release capsule   Yes No   Sig: Take 20 mg by mouth daily   potassium chloride (Klor-Con M10) 10 mEq tablet   No No   Sig: Take 2 tablets (20 mEq total) by mouth 2 (two) times a day for 7 days   simvastatin (ZOCOR) 20 mg tablet   Yes No   Sig: Take 20 mg by mouth daily at bedtime   traZODone (DESYREL) 50 mg tablet   No No   Sig: Take 1 tablet (50 mg total) by mouth daily at bedtime as needed for sleep   venlafaxine (EFFEXOR-XR) 150 mg 24 hr capsule   Yes No   Sig: Take 150 mg by mouth daily    vitamin B-12 (VITAMIN B-12) 1,000 mcg tablet   Yes No   Sig: Take by mouth daily      Facility-Administered Medications: None       Past Medical History:   Diagnosis Date    COPD (chronic obstructive pulmonary disease) (HCC)     Diverticulitis     GERD (gastroesophageal reflux disease)     Hypertension     Psychiatric disorder        Past Surgical History:   Procedure Laterality Date    ABDOMINAL SURGERY      BACK SURGERY      lower back surgery    CERVICAL SPINE SURGERY      rods placed in c4,c5,c6       History reviewed. No pertinent family history.  I have reviewed and agree with the history as documented.    E-Cigarette/Vaping    E-Cigarette Use Never User      E-Cigarette/Vaping Substances     Social History     Tobacco Use    Smoking status: Every Day     Current packs/day: 1.00     Types: Cigarettes    Smokeless tobacco: Never   Vaping Use    Vaping status: Never Used   Substance Use Topics    Alcohol use: Not Currently    Drug use: Not Currently       Review of Systems   Constitutional: Negative.    HENT: Negative.     Eyes: Negative.    Respiratory: Negative.     Cardiovascular: Negative.    Gastrointestinal:  Positive for nausea.   Endocrine: Negative.    Genitourinary: Negative.    Musculoskeletal: Negative.    Skin: Negative.    Allergic/Immunologic: Negative.    Neurological:  Positive for headaches.   Hematological: Negative.    Psychiatric/Behavioral:  Positive for hallucinations.        Physical  Exam  Physical Exam  Constitutional:       Appearance: She is well-developed.   HENT:      Head: Normocephalic and atraumatic.      Nose: Nose normal.      Mouth/Throat:      Mouth: Mucous membranes are moist.   Eyes:      Conjunctiva/sclera: Conjunctivae normal.      Pupils: Pupils are equal, round, and reactive to light.   Cardiovascular:      Rate and Rhythm: Normal rate.   Pulmonary:      Effort: Pulmonary effort is normal.   Abdominal:      Palpations: Abdomen is soft.   Musculoskeletal:         General: Normal range of motion.      Cervical back: Normal range of motion and neck supple.   Skin:     General: Skin is warm and dry.   Neurological:      Mental Status: She is alert and oriented to person, place, and time.   Psychiatric:      Comments:  patient became angry at times during evaluation when I attempted to redirect her as to the issue of concern today regarding her visit to the emergency department, she repeatedly informed me of her Chiari malformation and her concern that CSF fluid is leaking into her legs causing lower extremity swelling, even though multiple times I explained to her that physiologically that is not correct         Vital Signs  ED Triage Vitals   Temperature Pulse Respirations Blood Pressure SpO2   04/16/24 2154 04/16/24 2154 04/16/24 2154 04/16/24 2154 04/16/24 2154   99 °F (37.2 °C) 72 16 117/72 95 %      Temp Source Heart Rate Source Patient Position - Orthostatic VS BP Location FiO2 (%)   04/16/24 2154 04/16/24 2154 04/17/24 0148 04/16/24 2154 --   Temporal Monitor Sitting Right arm       Pain Score       04/16/24 2154       6           Vitals:    04/16/24 2154 04/17/24 0148   BP: 117/72 115/70   Pulse: 72 72   Patient Position - Orthostatic VS:  Sitting         Visual Acuity  Visual Acuity      Flowsheet Row Most Recent Value   L Pupil Size (mm) 3   R Pupil Size (mm) 3            ED Medications  Medications   potassium chloride 20 mEq IVPB (premix) (0 mEq Intravenous Stopped  4/17/24 0147)   magnesium sulfate 2 g/50 mL IVPB (premix) 2 g (0 g Intravenous Stopped 4/16/24 2359)   potassium chloride (Klor-Con M20) CR tablet 40 mEq (40 mEq Oral Given 4/16/24 2329)       Diagnostic Studies  Results Reviewed       Procedure Component Value Units Date/Time    TSH, 3rd generation with Free T4 reflex [[id number]]  (Normal) Collected: 04/16/24 2230    Lab Status: Final result Specimen: Blood from Arm, Right Updated: 04/16/24 2314     TSH 3RD GENERATON 1.949 uIU/mL     HS Troponin 0hr (reflex protocol) [[id number]]  (Normal) Collected: 04/16/24 2230    Lab Status: Final result Specimen: Blood from Arm, Right Updated: 04/16/24 2305     hs TnI 0hr 5 ng/L     B-Type Natriuretic Peptide(BNP) [[id number]]  (Normal) Collected: 04/16/24 2230    Lab Status: Final result Specimen: Blood from Arm, Right Updated: 04/16/24 2303     BNP 34 pg/mL     Comprehensive metabolic panel [[id number]]  (Abnormal) Collected: 04/16/24 2231    Lab Status: Final result Specimen: Blood from Arm, Right Updated: 04/16/24 2259     Sodium 140 mmol/L      Potassium 2.8 mmol/L      Chloride 101 mmol/L      CO2 29 mmol/L      ANION GAP 10 mmol/L      BUN 11 mg/dL      Creatinine 0.75 mg/dL      Glucose 88 mg/dL      Calcium 9.3 mg/dL      AST 26 U/L      ALT 20 U/L      Alkaline Phosphatase 56 U/L      Total Protein 6.7 g/dL      Albumin 4.1 g/dL      Total Bilirubin 0.78 mg/dL      eGFR 83 ml/min/1.73sq m     Narrative:      [employer] guidelines for Chronic Kidney Disease (CKD):     Stage 1 with normal or high GFR (GFR > 90 mL/min/1.73 square meters)    Stage 2 Mild CKD (GFR = 60-89 mL/min/1.73 square meters)    Stage 3A Moderate CKD (GFR = 45-59 mL/min/1.73 square meters)    Stage 3B Moderate CKD (GFR = 30-44 mL/min/1.73 square meters)    Stage 4 Severe CKD (GFR = 15-29 mL/min/1.73 square meters)    Stage 5 End Stage CKD (GFR <15 mL/min/1.73 square meters)  Note: GFR calculation is accurate only with a steady  state creatinine    Magnesium [[id number]]  (Abnormal) Collected: 04/16/24 2231    Lab Status: Final result Specimen: Blood from Arm, Right Updated: 04/16/24 2259     Magnesium 1.4 mg/dL     CK [[id number]]  (Normal) Collected: 04/16/24 2231    Lab Status: Final result Specimen: Blood from Arm, Right Updated: 04/16/24 2259     Total  U/L     Lipase [[id number]]  (Normal) Collected: 04/16/24 2231    Lab Status: Final result Specimen: Blood from Arm, Right Updated: 04/16/24 2259     Lipase 27 u/L     Protime-INR [[id number]]  (Normal) Collected: 04/16/24 2230    Lab Status: Final result Specimen: Blood from Arm, Right Updated: 04/16/24 2255     Protime 14.4 seconds      INR 1.09    APTT [[id number]]  (Normal) Collected: 04/16/24 2230    Lab Status: Final result Specimen: Blood from Arm, Right Updated: 04/16/24 2255     PTT 37 seconds     CBC and differential [[id number]] Collected: 04/16/24 2230    Lab Status: Final result Specimen: Blood from Arm, Right Updated: 04/16/24 2239     WBC 8.31 Thousand/uL      RBC 4.01 Million/uL      Hemoglobin 12.0 g/dL      Hematocrit 35.4 %      MCV 88 fL      MCH 29.9 pg      MCHC 33.9 g/dL      RDW 14.3 %      MPV 10.1 fL      Platelets 199 Thousands/uL      nRBC 0 /100 WBCs      Segmented % 49 %      Immature Grans % 0 %      Lymphocytes % 39 %      Monocytes % 7 %      Eosinophils Relative 4 %      Basophils Relative 1 %      Absolute Neutrophils 4.14 Thousands/µL      Absolute Immature Grans 0.01 Thousand/uL      Absolute Lymphocytes 3.25 Thousands/µL      Absolute Monocytes 0.56 Thousand/µL      Eosinophils Absolute 0.30 Thousand/µL      Basophils Absolute 0.05 Thousands/µL     UA w Reflex to Microscopic w Reflex to Culture [[id number]]     Lab Status: No result Specimen: Urine                    No orders to display              Procedures  Procedures         ED Course                               SBIRT 20yo+      Flowsheet Row Most Recent Value   Initial Alcohol Screen: US  AUDIT-C     1. How often do you have a drink containing alcohol? 0 Filed at: 04/16/2024 2245   2. How many drinks containing alcohol do you have on a typical day you are drinking?  0 Filed at: 04/16/2024 2245   3a. Male UNDER 65: How often do you have five or more drinks on one occasion? 0 Filed at: 04/16/2024 2245   3b. FEMALE Any Age, or MALE 65+: How often do you have 4 or more drinks on one occassion? 0 Filed at: 04/16/2024 2245   Audit-C Score 0 Filed at: 04/16/2024 2245   MIEK: How many times in the past year have you...    Used an illegal drug or used a prescription medication for non-medical reasons? Never Filed at: 04/16/2024 2245                      Medical Decision Making  This patient presents with a headache most consistent with benign headache from either tension type headache versus migraine.  No headache red flags.  Neurological exam without evidence of meningismus, AMS, focal neurological findings so doubt meningitis, encephalitis, stroke.  Presentation not consistent with acute intracranial bleed to include SAH (lack of risk factors, headache and history).  No history of trauma so doubt ICH.  Given history and physical temporal arteritis unlikely, as is acute angle-closure glaucoma.  Doubt carotid artery dissection given no focal neural logical deficits, no neck trauma or recent neck strain.  Patient with no signs of increased intracranial pressure or weight loss and history of physical suggest more benign headache, so less likely mass effect on brain from tumor or abscess or idiopathic intracranial hypertension.  Pain was controlled with headache cocktail and patient discharged home with PMD follow-up.      Problems Addressed:  Hypokalemia: acute illness or injury  Hypomagnesemia: acute illness or injury    Amount and/or Complexity of Data Reviewed  Labs: ordered. Decision-making details documented in ED Course.    Risk  OTC drugs.  Prescription drug management.             Disposition  Final  diagnoses:   Hypokalemia   Hypomagnesemia     Time reflects when diagnosis was documented in both MDM as applicable and the Disposition within this note       Time User Action Codes Description Comment    4/16/2024 11:47 PM Pat Salcedo [E87.6] Hypokalemia     4/16/2024 11:47 PM Pat Salcedo [E83.42] Hypomagnesemia           ED Disposition       ED Disposition   Discharge    Condition   Stable    Date/Time   Tue Apr 16, 2024 5247    Comment   Luba Winkler discharge to home/self care.                   Follow-up Information       Follow up With Specialties Details Why Contact Info    Jabier Cabrales, DO Internal Medicine   [address] [place] [address]  [phone]              Discharge Medication List as of 4/17/2024 12:00 AM        CONTINUE these medications which have NOT CHANGED    Details   aspirin (Aspirin 81) 81 mg chewable tablet Chew 81 mg, Historical Med      atenolol (TENORMIN) 50 mg tablet Take 50 mg by mouth daily at bedtime , Historical Med      Diclofenac Sodium (VOLTAREN) 1 % Apply 2 g topically 4 (four) times a day, Starting Mon 2/26/2024, Normal      ergocalciferol (ERGOCALCIFEROL) 1.25 MG (52793 UT) capsule Take 50,000 Units by mouth once a week, Historical Med      furosemide (Lasix) 40 mg tablet Take 1 tablet (40 mg total) by mouth daily, Starting Mon 2/26/2024, No Print      lisinopril (ZESTRIL) 20 mg tablet Take 20 mg by mouth daily , Historical Med      loratadine (CLARITIN) 10 mg tablet Take 1 tablet (10 mg total) by mouth daily, Starting Tue 2/27/2024, Normal      Multiple Vitamins-Minerals (CENTRUM SILVER 50+MEN PO) Take 1 tablet by mouth, Historical Med      naproxen sodium (ALEVE) 220 MG tablet Take 440 mg by mouth every 12 (twelve) hours as needed, Historical Med      omeprazole (PriLOSEC) 20 mg delayed release capsule Take 20 mg by mouth daily, Historical Med      potassium chloride (Klor-Con M10) 10 mEq tablet Take 2 tablets (20 mEq total) by mouth 2 (two)  times a day for 7 days, Starting Fri 3/8/2024, Until Fri 3/15/2024, Normal      QUEtiapine (SEROquel) 25 mg tablet Take 1 tablet (25 mg total) by mouth daily at bedtime, Starting Wed 3/6/2024, Normal      simvastatin (ZOCOR) 20 mg tablet Take 20 mg by mouth daily at bedtime, Historical Med      traZODone (DESYREL) 50 mg tablet Take 1 tablet (50 mg total) by mouth daily at bedtime as needed for sleep, Starting Mon 2/26/2024, Normal      venlafaxine (EFFEXOR-XR) 150 mg 24 hr capsule Take 150 mg by mouth daily , Historical Med      vitamin B-12 (VITAMIN B-12) 1,000 mcg tablet Take by mouth daily, Historical Med             No discharge procedures on file.    PDMP Review         Value Time User    PDMP Reviewed  Yes 11/24/2020 11:31 AM Vijay Diaz MD            ED Provider  Electronically Signed by             Pat Salcedo DO  04/17/24 0157

## 2024-10-19 ENCOUNTER — APPOINTMENT (EMERGENCY)
Dept: RADIOLOGY | Facility: HOSPITAL | Age: 64
DRG: 149 | End: 2024-10-19
Payer: MEDICARE

## 2024-10-19 ENCOUNTER — APPOINTMENT (EMERGENCY)
Dept: CT IMAGING | Facility: HOSPITAL | Age: 64
DRG: 149 | End: 2024-10-19
Payer: MEDICARE

## 2024-10-19 ENCOUNTER — HOSPITAL ENCOUNTER (INPATIENT)
Facility: HOSPITAL | Age: 64
LOS: 1 days | Discharge: HOME | DRG: 149 | End: 2024-10-21
Attending: EMERGENCY MEDICINE | Admitting: FAMILY MEDICINE
Payer: MEDICARE

## 2024-10-19 DIAGNOSIS — E87.6 HYPOKALEMIA: ICD-10-CM

## 2024-10-19 DIAGNOSIS — F17.200 CURRENT SMOKER: ICD-10-CM

## 2024-10-19 DIAGNOSIS — E83.42 HYPOMAGNESEMIA: ICD-10-CM

## 2024-10-19 DIAGNOSIS — S09.90XA CLOSED HEAD INJURY, INITIAL ENCOUNTER: ICD-10-CM

## 2024-10-19 DIAGNOSIS — R55 SYNCOPE AND COLLAPSE: ICD-10-CM

## 2024-10-19 DIAGNOSIS — F43.9 STRESS AT HOME: ICD-10-CM

## 2024-10-19 DIAGNOSIS — I10 ESSENTIAL HYPERTENSION: ICD-10-CM

## 2024-10-19 DIAGNOSIS — R55 SYNCOPE: Primary | ICD-10-CM

## 2024-10-19 DIAGNOSIS — R42 VERTIGO: ICD-10-CM

## 2024-10-19 LAB
ALBUMIN SERPL BCG-MCNC: 4.4 G/DL (ref 3.5–5)
ALP SERPL-CCNC: 67 U/L (ref 34–104)
ALT SERPL W P-5'-P-CCNC: 14 U/L (ref 7–52)
ANION GAP SERPL CALCULATED.3IONS-SCNC: 8 MMOL/L (ref 4–13)
AST SERPL W P-5'-P-CCNC: 23 U/L (ref 13–39)
BASOPHILS # BLD AUTO: 0.05 THOUSANDS/ΜL (ref 0–0.1)
BASOPHILS NFR BLD AUTO: 1 % (ref 0–1)
BILIRUB SERPL-MCNC: 0.41 MG/DL (ref 0.2–1)
BILIRUB UR QL STRIP: NEGATIVE
BNP SERPL-MCNC: 30 PG/ML (ref 0–100)
BUN SERPL-MCNC: 11 MG/DL (ref 5–25)
CALCIUM SERPL-MCNC: 9.6 MG/DL (ref 8.4–10.2)
CARDIAC TROPONIN I PNL SERPL HS: 3 NG/L
CHLORIDE SERPL-SCNC: 101 MMOL/L (ref 96–108)
CK SERPL-CCNC: 127 U/L (ref 26–192)
CLARITY UR: CLEAR
CO2 SERPL-SCNC: 26 MMOL/L (ref 21–32)
COLOR UR: YELLOW
CREAT SERPL-MCNC: 0.98 MG/DL (ref 0.6–1.3)
EOSINOPHIL # BLD AUTO: 0.4 THOUSAND/ΜL (ref 0–0.61)
EOSINOPHIL NFR BLD AUTO: 5 % (ref 0–6)
ERYTHROCYTE [DISTWIDTH] IN BLOOD BY AUTOMATED COUNT: 13.2 % (ref 11.6–15.1)
GFR SERPL CREATININE-BSD FRML MDRD: 60 ML/MIN/1.73SQ M
GLUCOSE SERPL-MCNC: 116 MG/DL (ref 65–140)
GLUCOSE UR STRIP-MCNC: NEGATIVE MG/DL
HCT VFR BLD AUTO: 38.1 % (ref 34.8–46.1)
HGB BLD-MCNC: 12.8 G/DL (ref 11.5–15.4)
HGB UR QL STRIP.AUTO: NEGATIVE
IMM GRANULOCYTES # BLD AUTO: 0.03 THOUSAND/UL (ref 0–0.2)
IMM GRANULOCYTES NFR BLD AUTO: 0 % (ref 0–2)
KETONES UR STRIP-MCNC: NEGATIVE MG/DL
LEUKOCYTE ESTERASE UR QL STRIP: NEGATIVE
LYMPHOCYTES # BLD AUTO: 3.32 THOUSANDS/ΜL (ref 0.6–4.47)
LYMPHOCYTES NFR BLD AUTO: 38 % (ref 14–44)
MAGNESIUM SERPL-MCNC: 1.5 MG/DL (ref 1.9–2.7)
MCH RBC QN AUTO: 30.3 PG (ref 26.8–34.3)
MCHC RBC AUTO-ENTMCNC: 33.6 G/DL (ref 31.4–37.4)
MCV RBC AUTO: 90 FL (ref 82–98)
MONOCYTES # BLD AUTO: 0.47 THOUSAND/ΜL (ref 0.17–1.22)
MONOCYTES NFR BLD AUTO: 5 % (ref 4–12)
NEUTROPHILS # BLD AUTO: 4.55 THOUSANDS/ΜL (ref 1.85–7.62)
NEUTS SEG NFR BLD AUTO: 51 % (ref 43–75)
NITRITE UR QL STRIP: NEGATIVE
NRBC BLD AUTO-RTO: 0 /100 WBCS
PH UR STRIP.AUTO: 6 [PH]
PLATELET # BLD AUTO: 196 THOUSANDS/UL (ref 149–390)
PMV BLD AUTO: 9.7 FL (ref 8.9–12.7)
POTASSIUM SERPL-SCNC: 3.3 MMOL/L (ref 3.5–5.3)
PROT SERPL-MCNC: 7.3 G/DL (ref 6.4–8.4)
PROT UR STRIP-MCNC: NEGATIVE MG/DL
RBC # BLD AUTO: 4.22 MILLION/UL (ref 3.81–5.12)
SODIUM SERPL-SCNC: 135 MMOL/L (ref 135–147)
SP GR UR STRIP.AUTO: <=1.005 (ref 1–1.03)
UROBILINOGEN UR QL STRIP.AUTO: 0.2 E.U./DL
WBC # BLD AUTO: 8.82 THOUSAND/UL (ref 4.31–10.16)

## 2024-10-19 PROCEDURE — 83735 ASSAY OF MAGNESIUM: CPT | Performed by: EMERGENCY MEDICINE

## 2024-10-19 PROCEDURE — 74177 CT ABD & PELVIS W/CONTRAST: CPT

## 2024-10-19 PROCEDURE — 84443 ASSAY THYROID STIM HORMONE: CPT | Performed by: FAMILY MEDICINE

## 2024-10-19 PROCEDURE — 71045 X-RAY EXAM CHEST 1 VIEW: CPT

## 2024-10-19 PROCEDURE — 96365 THER/PROPH/DIAG IV INF INIT: CPT

## 2024-10-19 PROCEDURE — 70486 CT MAXILLOFACIAL W/O DYE: CPT

## 2024-10-19 PROCEDURE — 93005 ELECTROCARDIOGRAM TRACING: CPT

## 2024-10-19 PROCEDURE — 99285 EMERGENCY DEPT VISIT HI MDM: CPT

## 2024-10-19 PROCEDURE — 84484 ASSAY OF TROPONIN QUANT: CPT | Performed by: EMERGENCY MEDICINE

## 2024-10-19 PROCEDURE — 70450 CT HEAD/BRAIN W/O DYE: CPT

## 2024-10-19 PROCEDURE — 72170 X-RAY EXAM OF PELVIS: CPT

## 2024-10-19 PROCEDURE — 72125 CT NECK SPINE W/O DYE: CPT

## 2024-10-19 PROCEDURE — 85025 COMPLETE CBC W/AUTO DIFF WBC: CPT | Performed by: EMERGENCY MEDICINE

## 2024-10-19 PROCEDURE — 83880 ASSAY OF NATRIURETIC PEPTIDE: CPT | Performed by: EMERGENCY MEDICINE

## 2024-10-19 PROCEDURE — 99285 EMERGENCY DEPT VISIT HI MDM: CPT | Performed by: EMERGENCY MEDICINE

## 2024-10-19 PROCEDURE — 82550 ASSAY OF CK (CPK): CPT | Performed by: EMERGENCY MEDICINE

## 2024-10-19 PROCEDURE — 80053 COMPREHEN METABOLIC PANEL: CPT | Performed by: EMERGENCY MEDICINE

## 2024-10-19 PROCEDURE — 71260 CT THORAX DX C+: CPT

## 2024-10-19 PROCEDURE — 81003 URINALYSIS AUTO W/O SCOPE: CPT | Performed by: EMERGENCY MEDICINE

## 2024-10-19 PROCEDURE — 36415 COLL VENOUS BLD VENIPUNCTURE: CPT | Performed by: EMERGENCY MEDICINE

## 2024-10-19 RX ORDER — POTASSIUM CHLORIDE 1500 MG/1
40 TABLET, EXTENDED RELEASE ORAL ONCE
Status: COMPLETED | OUTPATIENT
Start: 2024-10-19 | End: 2024-10-19

## 2024-10-19 RX ORDER — MAGNESIUM SULFATE HEPTAHYDRATE 40 MG/ML
2 INJECTION, SOLUTION INTRAVENOUS ONCE
Status: COMPLETED | OUTPATIENT
Start: 2024-10-19 | End: 2024-10-19

## 2024-10-19 RX ADMIN — MAGNESIUM SULFATE HEPTAHYDRATE 2 G: 40 INJECTION, SOLUTION INTRAVENOUS at 23:13

## 2024-10-19 RX ADMIN — POTASSIUM CHLORIDE 40 MEQ: 1500 TABLET, EXTENDED RELEASE ORAL at 23:53

## 2024-10-19 RX ADMIN — IOHEXOL 100 ML: 350 INJECTION, SOLUTION INTRAVENOUS at 21:39

## 2024-10-20 PROBLEM — R55 SYNCOPE AND COLLAPSE: Status: ACTIVE | Noted: 2024-10-20

## 2024-10-20 LAB
ANION GAP SERPL CALCULATED.3IONS-SCNC: 7 MMOL/L (ref 4–13)
BUN SERPL-MCNC: 10 MG/DL (ref 5–25)
CALCIUM SERPL-MCNC: 9.5 MG/DL (ref 8.4–10.2)
CHLORIDE SERPL-SCNC: 104 MMOL/L (ref 96–108)
CO2 SERPL-SCNC: 27 MMOL/L (ref 21–32)
CREAT SERPL-MCNC: 0.82 MG/DL (ref 0.6–1.3)
ERYTHROCYTE [DISTWIDTH] IN BLOOD BY AUTOMATED COUNT: 13.3 % (ref 11.6–15.1)
GFR SERPL CREATININE-BSD FRML MDRD: 74 ML/MIN/1.73SQ M
GLUCOSE SERPL-MCNC: 106 MG/DL (ref 65–140)
HCT VFR BLD AUTO: 38.7 % (ref 34.8–46.1)
HGB BLD-MCNC: 12.7 G/DL (ref 11.5–15.4)
MAGNESIUM SERPL-MCNC: 2 MG/DL (ref 1.9–2.7)
MCH RBC QN AUTO: 30.1 PG (ref 26.8–34.3)
MCHC RBC AUTO-ENTMCNC: 32.8 G/DL (ref 31.4–37.4)
MCV RBC AUTO: 92 FL (ref 82–98)
PLATELET # BLD AUTO: 194 THOUSANDS/UL (ref 149–390)
PMV BLD AUTO: 9.3 FL (ref 8.9–12.7)
POTASSIUM SERPL-SCNC: 4.5 MMOL/L (ref 3.5–5.3)
RBC # BLD AUTO: 4.22 MILLION/UL (ref 3.81–5.12)
SODIUM SERPL-SCNC: 138 MMOL/L (ref 135–147)
TSH SERPL DL<=0.05 MIU/L-ACNC: 5.18 UIU/ML (ref 0.45–4.5)
WBC # BLD AUTO: 7.41 THOUSAND/UL (ref 4.31–10.16)

## 2024-10-20 PROCEDURE — 83735 ASSAY OF MAGNESIUM: CPT | Performed by: FAMILY MEDICINE

## 2024-10-20 PROCEDURE — 80048 BASIC METABOLIC PNL TOTAL CA: CPT | Performed by: FAMILY MEDICINE

## 2024-10-20 PROCEDURE — 36415 COLL VENOUS BLD VENIPUNCTURE: CPT | Performed by: FAMILY MEDICINE

## 2024-10-20 PROCEDURE — 85027 COMPLETE CBC AUTOMATED: CPT | Performed by: FAMILY MEDICINE

## 2024-10-20 PROCEDURE — 99223 1ST HOSP IP/OBS HIGH 75: CPT | Performed by: FAMILY MEDICINE

## 2024-10-20 PROCEDURE — 84439 ASSAY OF FREE THYROXINE: CPT

## 2024-10-20 RX ORDER — VENLAFAXINE HYDROCHLORIDE 75 MG/1
150 CAPSULE, EXTENDED RELEASE ORAL DAILY
Status: DISCONTINUED | OUTPATIENT
Start: 2024-10-20 | End: 2024-10-20

## 2024-10-20 RX ORDER — QUETIAPINE FUMARATE 100 MG/1
1 TABLET, FILM COATED ORAL 2 TIMES DAILY
COMMUNITY
Start: 2024-10-02

## 2024-10-20 RX ORDER — TRAZODONE HYDROCHLORIDE 50 MG/1
75 TABLET, FILM COATED ORAL
COMMUNITY

## 2024-10-20 RX ORDER — POTASSIUM CHLORIDE 750 MG/1
10 TABLET, EXTENDED RELEASE ORAL DAILY
COMMUNITY
Start: 2024-05-17

## 2024-10-20 RX ORDER — NAPROXEN 250 MG/1
500 TABLET ORAL 2 TIMES DAILY PRN
Status: DISCONTINUED | OUTPATIENT
Start: 2024-10-20 | End: 2024-10-21 | Stop reason: HOSPADM

## 2024-10-20 RX ORDER — PANTOPRAZOLE SODIUM 20 MG/1
20 TABLET, DELAYED RELEASE ORAL
Status: DISCONTINUED | OUTPATIENT
Start: 2024-10-20 | End: 2024-10-21 | Stop reason: HOSPADM

## 2024-10-20 RX ORDER — LISINOPRIL 20 MG/1
20 TABLET ORAL DAILY
Status: DISCONTINUED | OUTPATIENT
Start: 2024-10-20 | End: 2024-10-21 | Stop reason: HOSPADM

## 2024-10-20 RX ORDER — PRAVASTATIN SODIUM 40 MG
40 TABLET ORAL
Status: DISCONTINUED | OUTPATIENT
Start: 2024-10-20 | End: 2024-10-21 | Stop reason: HOSPADM

## 2024-10-20 RX ORDER — ATENOLOL 50 MG/1
50 TABLET ORAL
Status: DISCONTINUED | OUTPATIENT
Start: 2024-10-20 | End: 2024-10-21 | Stop reason: HOSPADM

## 2024-10-20 RX ORDER — QUETIAPINE FUMARATE 100 MG/1
100 TABLET, FILM COATED ORAL 2 TIMES DAILY
Status: DISCONTINUED | OUTPATIENT
Start: 2024-10-20 | End: 2024-10-21 | Stop reason: HOSPADM

## 2024-10-20 RX ORDER — FUROSEMIDE 40 MG/1
40 TABLET ORAL DAILY
Status: DISCONTINUED | OUTPATIENT
Start: 2024-10-20 | End: 2024-10-21 | Stop reason: HOSPADM

## 2024-10-20 RX ORDER — TRAZODONE HYDROCHLORIDE 50 MG/1
50 TABLET, FILM COATED ORAL
Status: DISCONTINUED | OUTPATIENT
Start: 2024-10-20 | End: 2024-10-21 | Stop reason: HOSPADM

## 2024-10-20 RX ORDER — ENOXAPARIN SODIUM 100 MG/ML
40 INJECTION SUBCUTANEOUS DAILY
Status: DISCONTINUED | OUTPATIENT
Start: 2024-10-20 | End: 2024-10-21 | Stop reason: HOSPADM

## 2024-10-20 RX ORDER — TRAZODONE HYDROCHLORIDE 50 MG/1
50 TABLET, FILM COATED ORAL
Status: DISCONTINUED | OUTPATIENT
Start: 2024-10-20 | End: 2024-10-20

## 2024-10-20 RX ORDER — NICOTINE 21 MG/24HR
1 PATCH, TRANSDERMAL 24 HOURS TRANSDERMAL DAILY
Status: DISCONTINUED | OUTPATIENT
Start: 2024-10-20 | End: 2024-10-21 | Stop reason: HOSPADM

## 2024-10-20 RX ORDER — LORATADINE 10 MG/1
10 TABLET ORAL DAILY
Status: DISCONTINUED | OUTPATIENT
Start: 2024-10-20 | End: 2024-10-21 | Stop reason: HOSPADM

## 2024-10-20 RX ORDER — QUETIAPINE FUMARATE 100 MG/1
100 TABLET, FILM COATED ORAL
Status: DISCONTINUED | OUTPATIENT
Start: 2024-10-20 | End: 2024-10-20

## 2024-10-20 RX ORDER — VENLAFAXINE HYDROCHLORIDE 75 MG/1
75 CAPSULE, EXTENDED RELEASE ORAL DAILY
Status: DISCONTINUED | OUTPATIENT
Start: 2024-10-20 | End: 2024-10-21 | Stop reason: HOSPADM

## 2024-10-20 RX ORDER — POTASSIUM CHLORIDE 1500 MG/1
20 TABLET, EXTENDED RELEASE ORAL 2 TIMES DAILY
Status: DISCONTINUED | OUTPATIENT
Start: 2024-10-20 | End: 2024-10-21 | Stop reason: HOSPADM

## 2024-10-20 RX ORDER — LANOLIN ALCOHOL/MO/W.PET/CERES
2 CREAM (GRAM) TOPICAL
COMMUNITY

## 2024-10-20 RX ORDER — ASPIRIN 81 MG/1
81 TABLET, CHEWABLE ORAL DAILY
Status: DISCONTINUED | OUTPATIENT
Start: 2024-10-20 | End: 2024-10-21 | Stop reason: HOSPADM

## 2024-10-20 RX ORDER — VENLAFAXINE HYDROCHLORIDE 75 MG/1
75 CAPSULE, EXTENDED RELEASE ORAL DAILY
COMMUNITY
Start: 2024-09-23

## 2024-10-20 RX ORDER — QUETIAPINE FUMARATE 25 MG/1
25 TABLET, FILM COATED ORAL
Status: DISCONTINUED | OUTPATIENT
Start: 2024-10-20 | End: 2024-10-20

## 2024-10-20 RX ORDER — CALCIUM CARB/VITAMIN D3/VIT K1 500-500-40
15 TABLET,CHEWABLE ORAL DAILY
Status: DISCONTINUED | OUTPATIENT
Start: 2024-10-20 | End: 2024-10-20

## 2024-10-20 RX ADMIN — LORATADINE 10 MG: 10 TABLET ORAL at 08:15

## 2024-10-20 RX ADMIN — CYANOCOBALAMIN TAB 500 MCG 1000 MCG: 500 TAB at 08:15

## 2024-10-20 RX ADMIN — VENLAFAXINE HYDROCHLORIDE 75 MG: 75 CAPSULE, EXTENDED RELEASE ORAL at 08:15

## 2024-10-20 RX ADMIN — POTASSIUM CHLORIDE 20 MEQ: 1500 TABLET, EXTENDED RELEASE ORAL at 08:15

## 2024-10-20 RX ADMIN — TRAZODONE HYDROCHLORIDE 50 MG: 50 TABLET ORAL at 21:00

## 2024-10-20 RX ADMIN — ATENOLOL 50 MG: 50 TABLET ORAL at 02:20

## 2024-10-20 RX ADMIN — Medication 1 TABLET: at 08:15

## 2024-10-20 RX ADMIN — TRAZODONE HYDROCHLORIDE 50 MG: 50 TABLET ORAL at 02:20

## 2024-10-20 RX ADMIN — ENOXAPARIN SODIUM 40 MG: 40 INJECTION SUBCUTANEOUS at 08:17

## 2024-10-20 RX ADMIN — PRAVASTATIN SODIUM 40 MG: 40 TABLET ORAL at 15:59

## 2024-10-20 RX ADMIN — PANTOPRAZOLE SODIUM 20 MG: 20 TABLET, DELAYED RELEASE ORAL at 06:34

## 2024-10-20 RX ADMIN — ASPIRIN 81 MG 81 MG: 81 TABLET ORAL at 08:15

## 2024-10-20 RX ADMIN — FUROSEMIDE 40 MG: 40 TABLET ORAL at 08:15

## 2024-10-20 RX ADMIN — QUETIAPINE FUMARATE 100 MG: 100 TABLET ORAL at 17:24

## 2024-10-20 RX ADMIN — POTASSIUM CHLORIDE 20 MEQ: 1500 TABLET, EXTENDED RELEASE ORAL at 17:25

## 2024-10-20 RX ADMIN — QUETIAPINE FUMARATE 100 MG: 100 TABLET ORAL at 08:41

## 2024-10-20 RX ADMIN — LISINOPRIL 20 MG: 10 TABLET ORAL at 08:15

## 2024-10-20 NOTE — ASSESSMENT & PLAN NOTE
Patient a lot of stress at home to include her  leaving her relatively recently this year and her step kids dealing $65,000.  Does not report to be suicidal or homicidal but definitely could use some counseling for coping skills

## 2024-10-20 NOTE — ED PROVIDER NOTES
Emergency Department Trauma Note  Luba Winkler 64 y.o. female MRN: [id number]  Unit/Bed#: ED 07/ED 07 Encounter: [id number]      Trauma Alert: Trauma Acuity: Trauma Evaluation  Model of Arrival:   via    Trauma Team: Current Providers  Attending Provider: Gaurang Del Toro MD  Attending Provider: Josesito Wilcox MD  Registered Nurse: Tammy Solano RN  Registered Nurse: Tammy Solano RN  Registered Nurse: Sapphire Couch RN  Registered Nurse: Jackeline Brewer  Consultants:     None      History of Present Illness     Chief Complaint:   Chief Complaint   Patient presents with    Fall     Has had 3 falls in the past week, has been getting lightheaded. Stated when she fell she hit everything, now has head pain, denies blood thinners, no LOC      HPI:  Luba Winkler is a 64 y.o. female who presents with syncope, head trauma.  Mechanism:      Injury Occurence Location - Specify County: on goign fell at home again today down multiple concrete [place]    Today, fell, hit head.  On aspirin.  Patient states she has been having multiple syncopal episodes over the past several days.  States that this is what happened today.  She is not sure exactly why she had a syncopal episode.  She states she became suddenly dizzy and fainted.  She did not have chest pain or shortness of breath.  She states she has had multiple similar episodes over the past several days.      History provided by:  Patient   used: No    Fall  Mechanism of injury: fall    Injury location:  Head/neck  Incident location:  Home  Fall:     Fall occurred:  Standing    Point of impact:  Head  Suspicion of alcohol use: no    Suspicion of drug use: no    Prior to arrival data:     Bystander interventions:  None    Patient ambulatory at scene: yes      Blood loss:  None    Responsiveness at scene:  Alert    Orientation at scene:  Person, place, situation and time    Loss of consciousness: yes (Syncope preceded fall)      Airway  interventions:  None    Immobilization:  C-collar    Airway condition since incident:  Stable    Breathing condition since incident:  Stable    Circulation condition since incident:  Stable    Mental status condition since incident:  Stable    Disability condition since incident:  Stable  Associated symptoms: loss of consciousness    Associated symptoms: no abdominal pain, no chest pain, no headaches, no hearing loss, no nausea, no neck pain, no seizures and no vomiting      Review of Systems   Constitutional:  Negative for chills and fever.   HENT:  Negative for ear pain, hearing loss, sore throat, trouble swallowing and voice change.    Eyes:  Negative for pain and discharge.   Respiratory:  Negative for cough, shortness of breath and wheezing.    Cardiovascular:  Negative for chest pain and palpitations.   Gastrointestinal:  Negative for abdominal pain, blood in stool, constipation, diarrhea, nausea and vomiting.   Genitourinary:  Negative for dysuria, flank pain, frequency and hematuria.   Musculoskeletal:  Negative for joint swelling, neck pain and neck stiffness.   Skin:  Negative for rash and wound.   Neurological:  Positive for loss of consciousness. Negative for dizziness, seizures, syncope, facial asymmetry and headaches.   Psychiatric/Behavioral:  Negative for hallucinations, self-injury and suicidal ideas.    All other systems reviewed and are negative.      Historical Information     Immunizations:   Immunization History   Administered Date(s) Administered    COVID-19 PFIZER VACCINE 0.3 ML IM 03/03/2021, 03/24/2021    INFLUENZA 11/19/2019, 08/28/2020, 12/14/2021, 11/03/2022, 11/14/2023    Td (adult), adsorbed 01/26/2010    Tdap 01/26/2010       Past Medical History:   Diagnosis Date    COPD (chronic obstructive pulmonary disease) (HCC)     Diverticulitis     GERD (gastroesophageal reflux disease)     Hypertension     Psychiatric disorder      History reviewed. No pertinent family history.  Past Surgical  History:   Procedure Laterality Date    ABDOMINAL SURGERY      BACK SURGERY      lower back surgery    CERVICAL SPINE SURGERY      rods placed in c4,c5,c6     Social History     Tobacco Use    Smoking status: Every Day     Current packs/day: 1.00     Types: Cigarettes    Smokeless tobacco: Never   Vaping Use    Vaping status: Never Used   Substance Use Topics    Alcohol use: Not Currently    Drug use: Not Currently     E-Cigarette/Vaping    E-Cigarette Use Never User      E-Cigarette/Vaping Substances       Family History: History reviewed. No pertinent family history.    Meds/Allergies   Prior to Admission Medications   Prescriptions Last Dose Informant Patient Reported? Taking?   Diclofenac Sodium (VOLTAREN) 1 %   No No   Sig: Apply 2 g topically 4 (four) times a day   Multiple Vitamins-Minerals (CENTRUM SILVER 50+MEN PO)   Yes No   Sig: Take 1 tablet by mouth   QUEtiapine (SEROquel) 25 mg tablet   No No   Sig: Take 1 tablet (25 mg total) by mouth daily at bedtime   aspirin (Aspirin 81) 81 mg chewable tablet   Yes No   Sig: Chew 81 mg   atenolol (TENORMIN) 50 mg tablet  Self Yes No   Sig: Take 50 mg by mouth daily at bedtime    ergocalciferol (ERGOCALCIFEROL) 1.25 MG (84490 UT) capsule   Yes No   Sig: Take 50,000 Units by mouth once a week   furosemide (Lasix) 40 mg tablet   No No   Sig: Take 1 tablet (40 mg total) by mouth daily   lisinopril (ZESTRIL) 20 mg tablet   Yes No   Sig: Take 20 mg by mouth daily    loratadine (CLARITIN) 10 mg tablet   No No   Sig: Take 1 tablet (10 mg total) by mouth daily   naproxen sodium (ALEVE) 220 MG tablet   Yes No   Sig: Take 440 mg by mouth every 12 (twelve) hours as needed   omeprazole (PriLOSEC) 20 mg delayed release capsule   Yes No   Sig: Take 20 mg by mouth daily   potassium chloride (Klor-Con M10) 10 mEq tablet   No No   Sig: Take 2 tablets (20 mEq total) by mouth 2 (two) times a day for 7 days   simvastatin (ZOCOR) 20 mg tablet   Yes No   Sig: Take 20 mg by mouth daily at  bedtime   traZODone (DESYREL) 50 mg tablet   No No   Sig: Take 1 tablet (50 mg total) by mouth daily at bedtime as needed for sleep   venlafaxine (EFFEXOR-XR) 150 mg 24 hr capsule   Yes No   Sig: Take 150 mg by mouth daily    vitamin B-12 (VITAMIN B-12) 1,000 mcg tablet   Yes No   Sig: Take by mouth daily      Facility-Administered Medications: None       Allergies   Allergen Reactions    Avelox [Moxifloxacin]     Celexa [Citalopram]     Dilaudid [Hydromorphone]     Dust Mite Extract Sneezing    Fentanyl Other (See Comments)    Penicillins      psych    Prednisone     Topiramate Nausea Only       PHYSICAL EXAM    PE limited by: Nothing    Objective   Vitals:   First set: Temperature: 97.7 °F (36.5 °C) (10/19/24 2052)  Pulse: 87 (10/19/24 2051)  Respirations: 18 (10/19/24 2051)  Blood Pressure: 136/86 (10/19/24 2051)  SpO2: 95 % (10/19/24 2051)    Primary Survey:   (A) Airway: Intact  (B) Breathing: Intact  (C) Circulation: Pulses:   Normal  (D) Disabliity: GCS 15  (E) Expose:  Completed    Secondary Survey: (Click on Physical Exam tab above)  Physical Exam  Vitals and nursing note reviewed.   Constitutional:       General: She is not in acute distress.     Appearance: She is well-developed.   HENT:      Head: Normocephalic and atraumatic.      Right Ear: External ear normal.      Left Ear: External ear normal.   Eyes:      General: No scleral icterus.        Right eye: No discharge.         Left eye: No discharge.      Extraocular Movements: Extraocular movements intact.      Conjunctiva/sclera: Conjunctivae normal.   Cardiovascular:      Rate and Rhythm: Normal rate and regular rhythm.      Heart sounds: Normal heart sounds. No murmur heard.  Pulmonary:      Effort: Pulmonary effort is normal.      Breath sounds: Normal breath sounds. No wheezing or rales.   Abdominal:      General: Bowel sounds are normal. There is no distension.      Palpations: Abdomen is soft.      Tenderness: There is no abdominal tenderness.  There is no guarding or rebound.   Musculoskeletal:         General: No deformity. Normal range of motion.      Cervical back: Normal range of motion and neck supple.   Skin:     General: Skin is warm and dry.      Findings: No rash.   Neurological:      General: No focal deficit present.      Mental Status: She is alert and oriented to person, place, and time.      Cranial Nerves: No cranial nerve deficit.   Psychiatric:         Mood and Affect: Mood normal.         Behavior: Behavior normal.         Thought Content: Thought content normal.         Judgment: Judgment normal.         Cervical spine cleared by clinical criteria? No (imaging required)      Invasive Devices       Peripheral Intravenous Line  Duration             Peripheral IV 10/19/24 Left;Proximal;Ventral (anterior) Forearm <1 day                    Lab Results:   Results Reviewed       Procedure Component Value Units Date/Time    UA w Reflex to Microscopic w Reflex to Culture [[id number]] Collected: 10/19/24 2256    Lab Status: Final result Specimen: Urine, Clean Catch Updated: 10/19/24 2305     Color, UA Yellow     Clarity, UA Clear     Specific Gravity, UA <=1.005     pH, UA 6.0     Leukocytes, UA Negative     Nitrite, UA Negative     Protein, UA Negative mg/dl      Glucose, UA Negative mg/dl      Ketones, UA Negative mg/dl      Urobilinogen, UA 0.2 E.U./dl      Bilirubin, UA Negative     Occult Blood, UA Negative    B-Type Natriuretic Peptide(BNP) [[id number]]  (Normal) Collected: 10/19/24 2120    Lab Status: Final result Specimen: Blood from Arm, Left Updated: 10/19/24 2157     BNP 30 pg/mL     HS Troponin 0hr (reflex protocol) [[id number]]  (Normal) Collected: 10/19/24 2120    Lab Status: Final result Specimen: Blood from Arm, Left Updated: 10/19/24 2155     hs TnI 0hr 3 ng/L     Comprehensive metabolic panel [[id number]]  (Abnormal) Collected: 10/19/24 2120    Lab Status: Final result Specimen: Blood from Arm, Left Updated: 10/19/24 2147     Sodium  135 mmol/L      Potassium 3.3 mmol/L      Chloride 101 mmol/L      CO2 26 mmol/L      ANION GAP 8 mmol/L      BUN 11 mg/dL      Creatinine 0.98 mg/dL      Glucose 116 mg/dL      Calcium 9.6 mg/dL      AST 23 U/L      ALT 14 U/L      Alkaline Phosphatase 67 U/L      Total Protein 7.3 g/dL      Albumin 4.4 g/dL      Total Bilirubin 0.41 mg/dL      eGFR 60 ml/min/1.73sq m     Narrative:      National Kid[employer] guidelines for Chronic Kidney Disease (CKD):     Stage 1 with normal or high GFR (GFR > 90 mL/min/1.73 square meters)    Stage 2 Mild CKD (GFR = 60-89 mL/min/1.73 square meters)    Stage 3A Moderate CKD (GFR = 45-59 mL/min/1.73 square meters)    Stage 3B Moderate CKD (GFR = 30-44 mL/min/1.73 square meters)    Stage 4 Severe CKD (GFR = 15-29 mL/min/1.73 square meters)    Stage 5 End Stage CKD (GFR <15 mL/min/1.73 square meters)  Note: GFR calculation is accurate only with a steady state creatinine    CK [[id number]]  (Normal) Collected: 10/19/24 2120    Lab Status: Final result Specimen: Blood from Arm, Left Updated: 10/19/24 2147     Total  U/L     Magnesium [[id number]]  (Abnormal) Collected: 10/19/24 2120    Lab Status: Final result Specimen: Blood from Arm, Left Updated: 10/19/24 2147     Magnesium 1.5 mg/dL     CBC and differential [[id number]] Collected: 10/19/24 2120    Lab Status: Final result Specimen: Blood from Arm, Left Updated: 10/19/24 2129     WBC 8.82 Thousand/uL      RBC 4.22 Million/uL      Hemoglobin 12.8 g/dL      Hematocrit 38.1 %      MCV 90 fL      MCH 30.3 pg      MCHC 33.6 g/dL      RDW 13.2 %      MPV 9.7 fL      Platelets 196 Thousands/uL      nRBC 0 /100 WBCs      Segmented % 51 %      Immature Grans % 0 %      Lymphocytes % 38 %      Monocytes % 5 %      Eosinophils Relative 5 %      Basophils Relative 1 %      Absolute Neutrophils 4.55 Thousands/µL      Absolute Immature Grans 0.03 Thousand/uL      Absolute Lymphocytes 3.32 Thousands/µL      Absolute Monocytes 0.47  Thousand/µL      Eosinophils Absolute 0.40 Thousand/µL      Basophils Absolute 0.05 Thousands/µL                    Imaging Studies:   Direct to CT: Yes  TRAUMA - CT head wo contrast   Final Result by Tami Britt MD (10/19 2231)      No acute intracranial abnormality.      The study was marked in EPIC for immediate notification.                  Workstation performed: [id number]         TRAUMA - CT spine cervical wo contrast   Final Result by Tami Britt MD (10/19 2232)      No cervical spine fracture or traumatic malalignment.      The study was marked in EPIC for immediate notification.            Workstation performed: [id number]         TRAUMA - CT chest abdomen pelvis w contrast   Final Result by Tami Britt MD (10/19 2233)      No findings of acute traumatic injury in the chest, abdomen or pelvis.      Hepatic steatosis.      The study was marked in EPIC for immediate notification.         Workstation performed: [id number]         TRAUMA - CT facial bones wo contrast   Final Result by Tami Britt MD (10/19 2233)      No evidence of acute traumatic injury to the facial bones.      The study was marked in EPIC for immediate notification.               Workstation performed: [id number]         XR Trauma chest portable   Final Result by Tami Britt MD (10/19 2251)      No acute cardiopulmonary disease.            Workstation performed: [id number]         XR Trauma pelvis ap only 1 or 2 vw   Final Result by Tami Britt MD (10/19 2252)      No acute osseous abnormality.         Computerized Assisted Algorithm (CAA) may have been used to analyze all applicable images.         Workstation performed: [id number]               Procedures  ECG 12 Lead Documentation Only    Date/Time: 10/19/2024 9:17 PM    Performed by: Gaurang Del Toro MD  Authorized by: Gaurang Del Toro MD    ECG reviewed by me, the ED Provider: yes    Patient location:  ED  Previous ECG:     Previous ECG:   Unavailable  Interpretation:     Interpretation: normal    Rate:     ECG rate:  72    ECG rate assessment: normal    Rhythm:     Rhythm: sinus rhythm    Ectopy:     Ectopy: none    QRS:     QRS axis:  Normal    QRS intervals:  Normal  Conduction:     Conduction: normal    ST segments:     ST segments:  Normal  T waves:     T waves: normal             ED Course  ED Course as of 10/20/24 0002   Sat Oct 19, 2024   2312 Medicine contacted for admission, awaiting response   Sun Oct 20, 2024   0001 Medical service will evaluate and admit patient.           Medical Decision Making  Patient presents to the emergency department with syncope, head injury.      Based on the MSE and the history provided, patient may be at risk for neurogenic syncope, cardiac syncope, vasovagal syncope, orthostatic syncope, other syncope, traumatic injuries, electrolyte abnormalities, infections, dehydration    Based on the work-up performed in the emergency department which includes physical examination, laboratory testing, imaging which may include advanced imaging as necessary such as CT scan or ultrasound, it is deemed that the patient will require admission to the hospital for treatment of syncope.        Trauma evaluation negative in the emergency department but patient has had multiple syncopal episodes over the past several days.  Patient will require further evaluation for this and will be admitted by general medical service    Amount and/or Complexity of Data Reviewed  Labs: ordered. Decision-making details documented in ED Course.     Details: Mild hyponatremia, hypokalemia  Radiology: ordered and independent interpretation performed. Decision-making details documented in ED Course.     Details: Trauma imaging negative  ECG/medicine tests: ordered and independent interpretation performed. Decision-making details documented in ED Course.     Details: EKG reveals normal sinus rhythm rate 72    Risk  Prescription drug  management.                Disposition  [facility] Transfer: No  Final diagnoses:   Syncope   Closed head injury, initial encounter     Time reflects when diagnosis was documented in both MDM as applicable and the Disposition within this note       Time User Action Codes Description Comment    10/20/2024 12:01 AM Gaurang Del Toro [R55] Syncope     10/20/2024 12:02 AM Gaurang Del Toro [S09.90XA] Closed head injury, initial encounter           ED Disposition       ED Disposition   Admit    Condition   Stable    Date/Time   Sun Oct 20, 2024 12:01 AM    Comment                  Follow-up Information    None       Patient's Medications   Discharge Prescriptions    No medications on file     No discharge procedures on file.    PDMP Review         Value Time User    PDMP Reviewed  Yes 11/24/2020 11:31 AM Vijay Diaz MD            ED Provider  Electronically Signed by           Gaurang Del Toro MD  10/20/24 0002

## 2024-10-20 NOTE — PLAN OF CARE
Problem: Potential for Falls  Goal: Patient will remain free of falls  Description: INTERVENTIONS:  - Educate patient/family on patient safety including physical limitations  - Instruct patient to call for assistance with activity   - Consult OT/PT to assist with strengthening/mobility   - Keep Call bell within reach  - Keep bed low and locked with side rails adjusted as appropriate  - Keep care items and personal belongings within reach  - Initiate and maintain comfort rounds  - Make Fall Risk Sign visible to staff  - Offer Toileting every 2 Hours, in advance of need  - Initiate/Maintain bed alarm  - Obtain necessary fall risk management equipment: bed alarm   - Apply yellow socks and bracelet for high fall risk patients  - Consider moving patient to room near nurses station  Outcome: Progressing     Problem: DISCHARGE PLANNING  Goal: Discharge to home or other facility with appropriate resources  Description: INTERVENTIONS:  - Identify barriers to discharge w/patient and caregiver  - Arrange for needed discharge resources and transportation as appropriate  - Identify discharge learning needs (meds, wound care, etc.)  - Arrange for interpretive services to assist at discharge as needed  - Refer to Case Management Department for coordinating discharge planning if the patient needs post-hospital services based on physician/advanced practitioner order or complex needs related to functional status, cognitive ability, or social support system  Outcome: Progressing     Problem: Knowledge Deficit  Goal: Patient/family/caregiver demonstrates understanding of disease process, treatment plan, medications, and discharge instructions  Description: Complete learning assessment and assess knowledge base.  Interventions:  - Provide teaching at level of understanding  - Provide teaching via preferred learning methods  Outcome: Progressing     Problem: SAFETY ADULT  Goal: Patient will remain free of falls  Description:  INTERVENTIONS:  - Educate patient/family on patient safety including physical limitations  - Instruct patient to call for assistance with activity   - Consult OT/PT to assist with strengthening/mobility   - Keep Call bell within reach  - Keep bed low and locked with side rails adjusted as appropriate  - Keep care items and personal belongings within reach  - Initiate and maintain comfort rounds  - Make Fall Risk Sign visible to staff  - Offer Toileting every 2 Hours, in advance of need  - Initiate/Maintain bed alarm  - Obtain necessary fall risk management equipment: bed alarm   - Apply yellow socks and bracelet for high fall risk patients  - Consider moving patient to room near nurses station  Outcome: Progressing  Goal: Maintain or return to baseline ADL function  Description: INTERVENTIONS:  -  Assess patient's ability to carry out ADLs; assess patient's baseline for ADL function and identify physical deficits which impact ability to perform ADLs (bathing, care of mouth/teeth, toileting, grooming, dressing, etc.)  - Assess/evaluate cause of self-care deficits   - Assess range of motion  - Assess patient's mobility; develop plan if impaired  - Assess patient's need for assistive devices and provide as appropriate  - Encourage maximum independence but intervene and supervise when necessary  - Involve family in performance of ADLs  - Assess for home care needs following discharge   - Consider OT consult to assist with ADL evaluation and planning for discharge  - Provide patient education as appropriate  Outcome: Progressing  Goal: Maintains/Returns to pre admission functional level  Description: INTERVENTIONS:  - Perform AM-PAC 6 Click Basic Mobility/ Daily Activity assessment daily.  - Set and communicate daily mobility goal to care team and patient/family/caregiver.   - Collaborate with rehabilitation services on mobility goals if consulted  - Out of bed for toileting  - Record patient progress and toleration of  activity level   Outcome: Progressing

## 2024-10-20 NOTE — ED NOTES
Pt. Ambulated to bathroom with this RN as a stand by. No complaints of dizziness     Jackeline Brewer  10/20/24 040
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
## 2024-10-20 NOTE — ED NOTES
"Patient reporting she has been dizzy at randomly at rest and with position change, she notes having syncopal episodes for \"weeks\" did not follow up for same. She denies chest pain, visual disturbances, nausea, sob, palpitations prior to feeling dizzy.  Patient reports today she got dizzy, had a period of LOC and fell outside down multiple cement steps. Patient reports she was lying in a flower bed for awhile. Scattered ecchymosis noted. patient jumps when abdomen is palpated. Bruising noted to L upper arm, patient reports neck, headache, L hip pain, L arm pain, b/l rib pain. Patient takes ASA, cervical collar applied, trauma eval called at 2103.     Tammy Solano RN  10/19/24 2118    "
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
## 2024-10-20 NOTE — ASSESSMENT & PLAN NOTE
Patient with syncopal episodes about 3-4 times in the past 3 days.  Etiology is not clear.  Does not seem to be orthostatic in nature.  Will place her on a monitor and check an echocardiogram.  She had central imaging already at outlying facility which was unremarkable
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
## 2024-10-20 NOTE — ASSESSMENT & PLAN NOTE
Presented to ED with syncopal episodes about 3-4 times in the past 3 days  Woke up on the ground, became dizzy on trying to stand back on   Trauma eval negative   CT head without acute abnormality  No focal neurologic deficits   EKG NSR  Orthostatic vitals pending  Echocardiogram pending  Telemetry

## 2024-10-21 ENCOUNTER — APPOINTMENT (INPATIENT)
Dept: NON INVASIVE DIAGNOSTICS | Facility: HOSPITAL | Age: 64
DRG: 149 | End: 2024-10-21
Payer: MEDICARE

## 2024-10-21 VITALS
OXYGEN SATURATION: 99 % | HEART RATE: 59 BPM | RESPIRATION RATE: 17 BRPM | WEIGHT: 218 LBS | HEIGHT: 65 IN | SYSTOLIC BLOOD PRESSURE: 120 MMHG | DIASTOLIC BLOOD PRESSURE: 54 MMHG | TEMPERATURE: 97.3 F | BODY MASS INDEX: 36.32 KG/M2

## 2024-10-21 LAB
AORTIC VALVE MEAN VELOCITY: 10.6 M/S
APICAL FOUR CHAMBER EJECTION FRACTION: 67 %
ATRIAL RATE: 72 BPM
AV AREA BY CONTINUOUS VTI: 1.6 CM2
AV AREA PEAK VELOCITY: 1.8 CM2
AV LVOT MEAN GRADIENT: 2 MMHG
AV LVOT PEAK GRADIENT: 3 MMHG
AV MEAN GRADIENT: 5 MMHG
AV PEAK GRADIENT: 7 MMHG
AV VALVE AREA: 1.63 CM2
AV VELOCITY RATIO: 0.65
BSA FOR ECHO PROCEDURE: 2.05 M2
DOP CALC AO PEAK VEL: 1.35 M/S
DOP CALC AO VTI: 24.51 CM
DOP CALC LVOT AREA: 2.83 CM2
DOP CALC LVOT CARDIAC INDEX: 1.59 L/MIN/M2
DOP CALC LVOT CARDIAC OUTPUT: 3.26 L/MIN
DOP CALC LVOT DIAMETER: 1.9 CM
DOP CALC LVOT PEAK VEL VTI: 14.11 CM
DOP CALC LVOT PEAK VEL: 0.88 M/S
DOP CALC LVOT STROKE INDEX: 19.5 ML/M2
DOP CALC LVOT STROKE VOLUME: 39.99
E WAVE DECELERATION TIME: 199 MS
E/A RATIO: 0.63
FRACTIONAL SHORTENING: 33 (ref 28–44)
INTERVENTRICULAR SEPTUM IN DIASTOLE (PARASTERNAL SHORT AXIS VIEW): 1.3 CM
INTERVENTRICULAR SEPTUM: 1.3 CM (ref 0.6–1.1)
LEFT INTERNAL DIMENSION IN SYSTOLE: 2.2 CM (ref 2.1–4)
LEFT VENTRICULAR INTERNAL DIMENSION IN DIASTOLE: 3.3 CM (ref 3.5–6)
LEFT VENTRICULAR POSTERIOR WALL IN END DIASTOLE: 1.3 CM
LEFT VENTRICULAR STROKE VOLUME: 29 ML
LVSV (TEICH): 29 ML
MV PEAK A VEL: 0.79 M/S
MV PEAK E VEL: 50 CM/S
MV STENOSIS PRESSURE HALF TIME: 58 MS
MV VALVE AREA P 1/2 METHOD: 3.79
P AXIS: 63 DEGREES
PR INTERVAL: 164 MS
QRS AXIS: 49 DEGREES
QRSD INTERVAL: 80 MS
QT INTERVAL: 416 MS
QTC INTERVAL: 455 MS
SL CV PED ECHO LEFT VENTRICLE DIASTOLIC VOLUME (MOD BIPLANE) 2D: 45 ML
SL CV PED ECHO LEFT VENTRICLE SYSTOLIC VOLUME (MOD BIPLANE) 2D: 16 ML
T WAVE AXIS: 62 DEGREES
T4 FREE SERPL-MCNC: 0.71 NG/DL (ref 0.61–1.12)
TRICUSPID ANNULAR PLANE SYSTOLIC EXCURSION: 1.7 CM
VENTRICULAR RATE: 72 BPM

## 2024-10-21 PROCEDURE — 99239 HOSP IP/OBS DSCHRG MGMT >30: CPT

## 2024-10-21 PROCEDURE — 93321 DOPPLER ECHO F-UP/LMTD STD: CPT

## 2024-10-21 PROCEDURE — 93325 DOPPLER ECHO COLOR FLOW MAPG: CPT

## 2024-10-21 PROCEDURE — 93325 DOPPLER ECHO COLOR FLOW MAPG: CPT | Performed by: INTERNAL MEDICINE

## 2024-10-21 PROCEDURE — 93010 ELECTROCARDIOGRAM REPORT: CPT | Performed by: INTERNAL MEDICINE

## 2024-10-21 PROCEDURE — 93321 DOPPLER ECHO F-UP/LMTD STD: CPT | Performed by: INTERNAL MEDICINE

## 2024-10-21 PROCEDURE — 93308 TTE F-UP OR LMTD: CPT

## 2024-10-21 PROCEDURE — 93308 TTE F-UP OR LMTD: CPT | Performed by: INTERNAL MEDICINE

## 2024-10-21 RX ORDER — FLUTICASONE PROPIONATE 50 MCG
1 SPRAY, SUSPENSION (ML) NASAL DAILY
Qty: 9.9 ML | Refills: 0 | Status: SHIPPED | OUTPATIENT
Start: 2024-10-22

## 2024-10-21 RX ORDER — MECLIZINE HYDROCHLORIDE 25 MG/1
25 TABLET ORAL EVERY 8 HOURS SCHEDULED
Status: DISCONTINUED | OUTPATIENT
Start: 2024-10-21 | End: 2024-10-21 | Stop reason: HOSPADM

## 2024-10-21 RX ORDER — LISINOPRIL 20 MG/1
20 TABLET ORAL DAILY
Start: 2024-10-21

## 2024-10-21 RX ORDER — FLUTICASONE PROPIONATE 50 MCG
1 SPRAY, SUSPENSION (ML) NASAL DAILY
Status: DISCONTINUED | OUTPATIENT
Start: 2024-10-21 | End: 2024-10-21 | Stop reason: HOSPADM

## 2024-10-21 RX ORDER — MECLIZINE HYDROCHLORIDE 25 MG/1
25 TABLET ORAL EVERY 8 HOURS SCHEDULED
Qty: 30 TABLET | Refills: 0 | Status: SHIPPED | OUTPATIENT
Start: 2024-10-21

## 2024-10-21 RX ORDER — NICOTINE 21 MG/24HR
1 PATCH, TRANSDERMAL 24 HOURS TRANSDERMAL DAILY
Qty: 28 PATCH | Refills: 0 | Status: SHIPPED | OUTPATIENT
Start: 2024-10-22

## 2024-10-21 RX ORDER — MECLIZINE HYDROCHLORIDE 25 MG/1
25 TABLET ORAL EVERY 8 HOURS SCHEDULED
Status: DISCONTINUED | OUTPATIENT
Start: 2024-10-21 | End: 2024-10-21

## 2024-10-21 RX ADMIN — Medication 1 TABLET: at 08:31

## 2024-10-21 RX ADMIN — LORATADINE 10 MG: 10 TABLET ORAL at 08:31

## 2024-10-21 RX ADMIN — FLUTICASONE PROPIONATE 1 SPRAY: 50 SPRAY, METERED NASAL at 11:25

## 2024-10-21 RX ADMIN — PANTOPRAZOLE SODIUM 20 MG: 20 TABLET, DELAYED RELEASE ORAL at 05:16

## 2024-10-21 RX ADMIN — ASPIRIN 81 MG 81 MG: 81 TABLET ORAL at 08:31

## 2024-10-21 RX ADMIN — CYANOCOBALAMIN TAB 500 MCG 1000 MCG: 500 TAB at 08:31

## 2024-10-21 RX ADMIN — POTASSIUM CHLORIDE 20 MEQ: 1500 TABLET, EXTENDED RELEASE ORAL at 08:31

## 2024-10-21 RX ADMIN — MECLIZINE HYDROCHLORIDE 25 MG: 25 TABLET ORAL at 10:52

## 2024-10-21 RX ADMIN — ENOXAPARIN SODIUM 40 MG: 40 INJECTION SUBCUTANEOUS at 08:31

## 2024-10-21 RX ADMIN — QUETIAPINE FUMARATE 100 MG: 100 TABLET ORAL at 08:30

## 2024-10-21 RX ADMIN — VENLAFAXINE HYDROCHLORIDE 75 MG: 75 CAPSULE, EXTENDED RELEASE ORAL at 08:31

## 2024-10-21 NOTE — DISCHARGE INSTR - AVS FIRST PAGE
Dear Luba Winkler,     It was our pleasure to care for you here at [facility]. For follow up as well as any medication refills, we recommend that you follow up with your primary care physician. Here are the most important instructions/ recommendations at discharge:     Notable Medication Adjustments -   Do not take your lisinopril unless your blood pressure at home is consistently 120-130 or greater as the top number (systolic) over the next few days  Can start taking meclizine every 8 hours to help with vertigo\  Can start taking Flonase to help with nasal congestion and prevent vertigo  Important follow up information -   Follow-up with your PCP  Other Instructions -   Measure your blood pressure every day in the morning  Would recommend starting to see outpatient physical therapy for help with vertigo  Please review this entire after visit summary as additional general instructions including medication list, appointments, activity, diet, any pertinent wound care, and other additional recommendations from your care team that may be provided for you.      Sincerely,     Selam Bui PA-C

## 2024-10-21 NOTE — PLAN OF CARE
Problem: Potential for Falls  Goal: Patient will remain free of falls  Description: INTERVENTIONS:  - Educate patient/family on patient safety including physical limitations  - Instruct patient to call for assistance with activity   - Consult OT/PT to assist with strengthening/mobility   - Keep Call bell within reach  - Keep bed low and locked with side rails adjusted as appropriate  - Keep care items and personal belongings within reach  - Initiate and maintain comfort rounds  - Make Fall Risk Sign visible to staff  - Offer Toileting every 3 Hours, in advance of need  - Initiate/Maintain bed alarm  - Obtain necessary fall risk management equipment  - Apply yellow socks and bracelet for high fall risk patients  - Consider moving patient to room near nurses station  Outcome: Progressing     Problem: DISCHARGE PLANNING  Goal: Discharge to home or other facility with appropriate resources  Description: INTERVENTIONS:  - Identify barriers to discharge w/patient and caregiver  - Arrange for needed discharge resources and transportation as appropriate  - Identify discharge learning needs (meds, wound care, etc.)  - Arrange for interpretive services to assist at discharge as needed  - Refer to Case Management Department for coordinating discharge planning if the patient needs post-hospital services based on physician/advanced practitioner order or complex needs related to functional status, cognitive ability, or social support system  Outcome: Progressing     Problem: Knowledge Deficit  Goal: Patient/family/caregiver demonstrates understanding of disease process, treatment plan, medications, and discharge instructions  Description: Complete learning assessment and assess knowledge base.  Interventions:  - Provide teaching at level of understanding  - Provide teaching via preferred learning methods  Outcome: Progressing     Problem: SAFETY ADULT  Goal: Patient will remain free of falls  Description: INTERVENTIONS:  -  Educate patient/family on patient safety including physical limitations  - Instruct patient to call for assistance with activity   - Consult OT/PT to assist with strengthening/mobility   - Keep Call bell within reach  - Keep bed low and locked with side rails adjusted as appropriate  - Keep care items and personal belongings within reach  - Initiate and maintain comfort rounds  - Make Fall Risk Sign visible to staff  - Offer Toileting every 3 Hours, in advance of need  - Initiate/Maintain bed alarm  - Obtain necessary fall risk management equipment  - Apply yellow socks and bracelet for high fall risk patients  - Consider moving patient to room near nurses station  Outcome: Progressing  Goal: Maintain or return to baseline ADL function  Description: INTERVENTIONS:  -  Assess patient's ability to carry out ADLs; assess patient's baseline for ADL function and identify physical deficits which impact ability to perform ADLs (bathing, care of mouth/teeth, toileting, grooming, dressing, etc.)  - Assess/evaluate cause of self-care deficits   - Assess range of motion  - Assess patient's mobility; develop plan if impaired  - Assess patient's need for assistive devices and provide as appropriate  - Encourage maximum independence but intervene and supervise when necessary  - Involve family in performance of ADLs  - Assess for home care needs following discharge   - Consider OT consult to assist with ADL evaluation and planning for discharge  - Provide patient education as appropriate  Outcome: Progressing  Goal: Maintains/Returns to pre admission functional level  Description: INTERVENTIONS:  - Perform AM-PAC 6 Click Basic Mobility/ Daily Activity assessment daily.  - Set and communicate daily mobility goal to care team and patient/family/caregiver.   - Collaborate with rehabilitation services on mobility goals if consulted  - Perform Range of Motion 3 times a day.  - Reposition patient every 2 hours.  - Dangle patient 3 times  a day  - Stand patient 3 times a day  - Ambulate patient 3 times a day  - Out of bed to chair 3 times a day   - Out of bed for meals 3 times a day  - Out of bed for toileting  - Record patient progress and toleration of activity level   Outcome: Progressing

## 2024-10-21 NOTE — ASSESSMENT & PLAN NOTE
Presented to ED with syncopal episodes about 3-4 times in the past 3 days  Woke up on the ground, became dizzy on trying to stand back on   Trauma eval negative   CT head without acute abnormality  No focal neurologic deficits   EKG NSR  Orthostatic vitals WNL  Echocardiogram without change from previous   Telemetry  Patient describes symptoms characteristic of vertigo, will trial meclizine and Flonase   Described symptoms as positional  Can trial OP PT if she desires to help with vertigo

## 2024-10-21 NOTE — ASSESSMENT & PLAN NOTE
Mag 1.5 on presentation - replaced IV with improvement to 2   Optomap Screening Retinal Photo Report    Note: The correlating image(s) are uploaded in EPIC for viewing    Right eye:   Quality of image: Good  Field of view:Full  Optic Nerve:  · Cup to disc ratio: 0.3  · Perfusion: Good, healthy color  · Rim:Intact  Retina: Drusen superior, temporal, inferior and nasal  Fundus Autoflouress:  Abnormal: retinal drusen  Macula: Flat and intact  Vitreous: floaters      Left eye:   Quality of image: Good  Field of view:Full  Optic Nerve:  · Cup to disc ratio: 0.3  · Perfusion: Good, healthy color  · Rim:Intact  Retina: Drusen superior, temporal, inferior and nasal  Fundus Autoflouress:  Abnormal: retinal drusen  Macula: Flat and intact  Vitreous: floaters      Impression and Plan:  Impression: retinal drusen, bilateral  Plan: observe, repeat as needed    Jigna Guy OD
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
## 2024-10-21 NOTE — ASSESSMENT & PLAN NOTE
Is on lisinopril and lasix and atenolol with well controlled BP   D/c lisinopril for time being as BP has been consistently low and may be contributing to syncopal episodes

## 2024-10-23 NOTE — PROGRESS NOTES
PT Evaluation     Today's date: 10/24/2024  Patient name: Luba Winkler  : 1960  MRN: [id number]  Referring provider: Marybeth Lopez, *  Dx:   Encounter Diagnosis     ICD-10-CM    1. BPPV (benign paroxysmal positional vertigo), right  H81.11       2. Vertigo  R42 Ambulatory referral to Physical Therapy          Start Time: 1600  Stop Time: 1645  Total time in clinic (min): 45 minutes    Assessment  Impairments: abnormal or restricted ROM, lacks appropriate home exercise program, participation limitations and activity limitations    Assessment details: Patient is a 63 y/o female presenting to OP PT w/ c/o passing out. Upon evaluation, she presented with oculomotor exam WFL and unremarkable findings with L positional testing. R positional testing was positive for posterior canalithiasis. Patient was instructed in CRT with positive results on first attempt. Upon reassessment using the [place]-Hallpike technique, the patient presented with ageotropic nystagmus and was symptomatic, then had a change in the fast beat phase of her nystagmus to geotropic. The CRT was performed with some lingering symptoms upon sitting upright. Patient was educated in these findings and instructed to see PT for a follow up assessment to determine need for continued PT or referral out.     Goals  STG to be met in 4 week:  1. Pt will be I in HEP to improve vestibular functioning.   2. Pt will report no instances of severe bouts of dizziness to improve QoL.   3. Pt will report decrease in dizziness symptom severity to less than 5/10.   4. Pt will report decreased dizziness with smooth pursuits indicating improved functioning of VOR.     LTG to be met within 6 weeks:  1. Pt will report no dizziness during ambulation to reduce risk for falls.   2. Pt will display decreased saccadic movement correction during head thrust testing, indicating improvement in condition.   3. Pt will meet FOTO score goal for DC.  4. Pt will be I in HEP for  EFRAIN.      Plan  Patient would benefit from: skilled physical therapy and PT eval    Planned therapy interventions: canalith repositioning, home exercise program, self care and patient/caregiver education    Duration in weeks: 3  Treatment plan discussed with: patient        Subjective Evaluation    History of Present Illness  Mechanism of injury: Patient reports she recently had three falls while taking her dog outside and does not recall what caused them. She states she just quickly passed out and then woke up on the ground each time. She suffered minor injuries with each fall but was seen in the hospital for workup to ensure this was not due to underlying cause. Patient reports she has had some dizziness with position changes in bed, especially getting out of bed and then standing up too quickly. She also reports having dizziness when bending down to the floor and looking up.  Patient Goals  Patient goal: I don't want to fall again.  Treatments  Current treatment: physical therapy        Objective   Neuro Exam:     Oculomotor exam   Oculomotor ROM: WNL  Gaze holding nystagmus: not present left  and not present right  Smooth pursuits: within normal limits  Vertical saccades: normal  Horizontal saccades: normal  Convergence: normal    Positional testing   [place]-Hallpike   Left posterior canal: WNL  Right posterior canal: symptomatic, torsional and upbeating  [place]-Hallpike comment: change in direction part way through to ageotropic  Roll test   Left horizontal canal: WNL  Right horizontal canal: WNL             Precautions: PMH HTN, h/o hallucinations, current smoker, COPD, cervical surgery   POC Expiration: 11/14/24      Manuals 10/24            CRT R Epley                                                   Neuro Re-Ed                                                                                                        Ther Ex                                                                                                                      Ther Activity                                       Gait Training                                       Modalities

## 2024-10-24 ENCOUNTER — EVALUATION (OUTPATIENT)
Dept: PHYSICAL THERAPY | Facility: CLINIC | Age: 64
End: 2024-10-24
Payer: MEDICARE

## 2024-10-24 DIAGNOSIS — H81.11 BPPV (BENIGN PAROXYSMAL POSITIONAL VERTIGO), RIGHT: Primary | ICD-10-CM

## 2024-10-24 DIAGNOSIS — R42 VERTIGO: ICD-10-CM

## 2024-10-24 PROCEDURE — 95992 CANALITH REPOSITIONING PROC: CPT

## 2024-10-24 PROCEDURE — 97161 PT EVAL LOW COMPLEX 20 MIN: CPT

## 2024-10-24 NOTE — LETTER
2024    Marybeth Lopez DO  [address] [facility] [address]    Patient: Luba Winkler   YOB: 1960   Date of Visit: 10/24/2024     Encounter Diagnosis     ICD-10-CM    1. BPPV (benign paroxysmal positional vertigo), right  H81.11       2. Vertigo  R42 Ambulatory referral to Physical Therapy          Dear Dr. Lopez:    Thank you for your recent referral of Luba Winkler. Please review the attached evaluation summary from Luba's recent visit.     Please verify that you agree with the plan of care by signing the attached order.     If you have any questions or concerns, please do not hesitate to call.     I sincerely appreciate the opportunity to share in the care of one of your patients and hope to have another opportunity to work with you in the near future.       Sincerely,    Kendal Escobedo, PT      Referring Provider:      I certify that I have read the below Plan of Care and certify the need for these services furnished under this plan of treatment while under my care.                    Marybeth Lopez DO  [address] [facility] [address]  Via Fax: [phone]          PT Evaluation     Today's date: 10/24/2024  Patient name: Luba Winkler  : 1960  MRN: [id number]  Referring provider: Marybeth Lopez, *  Dx:   Encounter Diagnosis     ICD-10-CM    1. BPPV (benign paroxysmal positional vertigo), right  H81.11       2. Vertigo  R42 Ambulatory referral to Physical Therapy          Start Time: 1600  Stop Time: 1645  Total time in clinic (min): 45 minutes    Assessment  Impairments: abnormal or restricted ROM, lacks appropriate home exercise program, participation limitations and activity limitations    Assessment details: Patient is a 63 y/o female presenting to OP PT w/ c/o passing out. Upon evaluation, she presented with oculomotor exam WFL and unremarkable findings with L positional testing. R positional testing was positive for posterior  canalithiasis. Patient was instructed in CRT with positive results on first attempt. Upon reassessment using the [place]ore[place]-Hallpike technique, the patient presented with ageotropic nystagmus and was symptomatic, then had a change in the fast beat phase of her nystagmus to geotropic. The CRT was performed with some lingering symptoms upon sitting upright. Patient was educated in these findings and instructed to see PT for a follow up assessment to determine need for continued PT or referral out.     Goals  STG to be met in 4 week:  1. Pt will be I in HEP to improve vestibular functioning.   2. Pt will report no instances of severe bouts of dizziness to improve QoL.   3. Pt will report decrease in dizziness symptom severity to less than 5/10.   4. Pt will report decreased dizziness with smooth pursuits indicating improved functioning of VOR.     LTG to be met within 6 weeks:  1. Pt will report no dizziness during ambulation to reduce risk for falls.   2. Pt will display decreased saccadic movement correction during head thrust testing, indicating improvement in condition.   3. Pt will meet FOTO score goal for DC.  4. Pt will be I in HEP for DC.      Plan  Patient would benefit from: skilled physical therapy and PT eval    Planned therapy interventions: canalith repositioning, home exercise program, self care and patient/caregiver education    Duration in weeks: 3  Treatment plan discussed with: patient        Subjective Evaluation    History of Present Illness  Mechanism of injury: Patient reports she recently had three falls while taking her dog outside and does not recall what caused them. She states she just quickly passed out and then woke up on the ground each time. She suffered minor injuries with each fall but was seen in the hospital for workup to ensure this was not due to underlying cause. Patient reports she has had some dizziness with position changes in bed, especially getting out of bed and then standing up  too quickly. She also reports having dizziness when bending down to the floor and looking up.  Patient Goals  Patient goal: I don't want to fall again.  Treatments  Current treatment: physical therapy        Objective   Neuro Exam:     Oculomotor exam   Oculomotor ROM: WNL  Gaze holding nystagmus: not present left  and not present right  Smooth pursuits: within normal limits  Vertical saccades: normal  Horizontal saccades: normal  Convergence: normal    Positional testing   Lorena-Hallpike   Left posterior canal: WNL  Right posterior canal: symptomatic, torsional and upbeating  [place]-Hallpike comment: change in direction part way through to ageotropic  Roll test   Left horizontal canal: WNL  Right horizontal canal: WNL             Precautions: PMH HTN, h/o hallucinations, current smoker, COPD, cervical surgery   POC Expiration: 11/14/24      Manuals 10/24            CRT R Epley                                                   Neuro Re-Ed                                                                                                        Ther Ex                                                                                                                     Ther Activity                                       Gait Training                                       Modalities

## 2024-10-30 ENCOUNTER — OFFICE VISIT (OUTPATIENT)
Dept: PHYSICAL THERAPY | Facility: CLINIC | Age: 64
End: 2024-10-30
Payer: MEDICARE

## 2024-10-30 DIAGNOSIS — R42 VERTIGO: Primary | ICD-10-CM

## 2024-10-30 DIAGNOSIS — H81.11 BPPV (BENIGN PAROXYSMAL POSITIONAL VERTIGO), RIGHT: ICD-10-CM

## 2024-10-30 PROCEDURE — 95992 CANALITH REPOSITIONING PROC: CPT

## 2024-10-30 NOTE — PROGRESS NOTES
Daily Note     Today's date: 10/30/2024  Patient name: Luba Winkler  : 1960  MRN: [id number]  Referring provider: Marybeth Lopez, *  Dx:   Encounter Diagnosis     ICD-10-CM    1. Vertigo  R42       2. BPPV (benign paroxysmal positional vertigo), right  H81.11           Start Time: 1430  Stop Time: 1500  Total time in clinic (min): 30 minutes    Subjective: Patient reports she was at the doctor and had a little bit of nystagmus in her eyes while there but has been feeling better since being seen at [facility].      Objective: See treatment diary below      Assessment: Patient's symptoms were reassessed with positional testing and she presented with torsional nystagmus during the R jeremias-hallpike, indicating R posterior canalithiasis. She was instructed in Epley maneuver and had no symptoms post treatment, indicating apparent success.       Plan: Patient will only be seen if she has symptoms at home. Patient's case will remain open for 1 week with no plan to see her unless she calls our office.      Precautions: PMH HTN, h/o hallucinations, current smoker, COPD, cervical surgery   POC Expiration: 24      Manuals 10/24 10/30           CRT R Epley R Epley                                                  Neuro Re-Ed                                                                                                        Ther Ex                                                                                                                     Ther Activity                                       Gait Training                                       Modalities
